# Patient Record
Sex: MALE | Race: WHITE | NOT HISPANIC OR LATINO | Employment: OTHER | URBAN - NONMETROPOLITAN AREA
[De-identification: names, ages, dates, MRNs, and addresses within clinical notes are randomized per-mention and may not be internally consistent; named-entity substitution may affect disease eponyms.]

---

## 2021-11-10 ENCOUNTER — APPOINTMENT (OUTPATIENT)
Dept: GENERAL RADIOLOGY | Facility: HOSPITAL | Age: 71
End: 2021-11-10

## 2021-11-10 ENCOUNTER — APPOINTMENT (OUTPATIENT)
Dept: CARDIOLOGY | Facility: HOSPITAL | Age: 71
End: 2021-11-10

## 2021-11-10 ENCOUNTER — HOSPITAL ENCOUNTER (INPATIENT)
Facility: HOSPITAL | Age: 71
LOS: 1 days | Discharge: ANOTHER HOSPITAL | End: 2021-11-12
Attending: EMERGENCY MEDICINE | Admitting: FAMILY MEDICINE

## 2021-11-10 DIAGNOSIS — I21.4 NON Q WAVE MYOCARDIAL INFARCTION (HCC): ICD-10-CM

## 2021-11-10 DIAGNOSIS — I21.4 NSTEMI (NON-ST ELEVATION MYOCARDIAL INFARCTION) (HCC): ICD-10-CM

## 2021-11-10 DIAGNOSIS — I10 BENIGN HYPERTENSION: ICD-10-CM

## 2021-11-10 DIAGNOSIS — R07.9 CHEST PAIN, UNSPECIFIED TYPE: Primary | ICD-10-CM

## 2021-11-10 LAB
ALBUMIN SERPL-MCNC: 4.6 G/DL (ref 3.5–5.2)
ALBUMIN/GLOB SERPL: 2 G/DL
ALP SERPL-CCNC: 53 U/L (ref 39–117)
ALT SERPL W P-5'-P-CCNC: 17 U/L (ref 1–41)
ANION GAP SERPL CALCULATED.3IONS-SCNC: 11 MMOL/L (ref 5–15)
AST SERPL-CCNC: 16 U/L (ref 1–40)
BASOPHILS # BLD AUTO: 0.09 10*3/MM3 (ref 0–0.2)
BASOPHILS NFR BLD AUTO: 1.1 % (ref 0–1.5)
BH CV ECHO MEAS - ACS: 2.3 CM
BH CV ECHO MEAS - AO MAX PG (FULL): 4.5 MMHG
BH CV ECHO MEAS - AO MAX PG: 7.4 MMHG
BH CV ECHO MEAS - AO MEAN PG (FULL): 3 MMHG
BH CV ECHO MEAS - AO MEAN PG: 4 MMHG
BH CV ECHO MEAS - AO ROOT AREA (BSA CORRECTED): 1.6
BH CV ECHO MEAS - AO ROOT AREA: 8.6 CM^2
BH CV ECHO MEAS - AO ROOT DIAM: 3.3 CM
BH CV ECHO MEAS - AO V2 MAX: 136 CM/SEC
BH CV ECHO MEAS - AO V2 MEAN: 91.3 CM/SEC
BH CV ECHO MEAS - AO V2 VTI: 22.7 CM
BH CV ECHO MEAS - ASC AORTA: 3.1 CM
BH CV ECHO MEAS - AVA(I,A): 3 CM^2
BH CV ECHO MEAS - AVA(I,D): 3 CM^2
BH CV ECHO MEAS - AVA(V,A): 2.4 CM^2
BH CV ECHO MEAS - AVA(V,D): 2.4 CM^2
BH CV ECHO MEAS - BSA(HAYCOCK): 2.1 M^2
BH CV ECHO MEAS - BSA: 2 M^2
BH CV ECHO MEAS - BZI_BMI: 25.9 KILOGRAMS/M^2
BH CV ECHO MEAS - BZI_METRIC_HEIGHT: 180.3 CM
BH CV ECHO MEAS - BZI_METRIC_WEIGHT: 84.4 KG
BH CV ECHO MEAS - EDV(CUBED): 120.6 ML
BH CV ECHO MEAS - EDV(MOD-SP2): 89.2 ML
BH CV ECHO MEAS - EDV(MOD-SP4): 94.1 ML
BH CV ECHO MEAS - EDV(TEICH): 115 ML
BH CV ECHO MEAS - EF(CUBED): 72.3 %
BH CV ECHO MEAS - EF(MOD-SP2): 57.8 %
BH CV ECHO MEAS - EF(MOD-SP4): 52.3 %
BH CV ECHO MEAS - EF(TEICH): 63.8 %
BH CV ECHO MEAS - ESV(CUBED): 33.4 ML
BH CV ECHO MEAS - ESV(MOD-SP2): 37.6 ML
BH CV ECHO MEAS - ESV(MOD-SP4): 44.9 ML
BH CV ECHO MEAS - ESV(TEICH): 41.6 ML
BH CV ECHO MEAS - FS: 34.8 %
BH CV ECHO MEAS - IVS/LVPW: 0.89
BH CV ECHO MEAS - IVSD: 0.67 CM
BH CV ECHO MEAS - LA DIMENSION: 3.6 CM
BH CV ECHO MEAS - LA/AO: 1.1
BH CV ECHO MEAS - LV DIASTOLIC VOL/BSA (35-75): 46 ML/M^2
BH CV ECHO MEAS - LV MASS(C)D: 115.2 GRAMS
BH CV ECHO MEAS - LV MASS(C)DI: 56.4 GRAMS/M^2
BH CV ECHO MEAS - LV MAX PG: 2.9 MMHG
BH CV ECHO MEAS - LV MEAN PG: 1 MMHG
BH CV ECHO MEAS - LV SYSTOLIC VOL/BSA (12-30): 22 ML/M^2
BH CV ECHO MEAS - LV V1 MAX: 85.3 CM/SEC
BH CV ECHO MEAS - LV V1 MEAN: 50.5 CM/SEC
BH CV ECHO MEAS - LV V1 VTI: 17.7 CM
BH CV ECHO MEAS - LVIDD: 4.9 CM
BH CV ECHO MEAS - LVIDS: 3.2 CM
BH CV ECHO MEAS - LVLD AP2: 8.8 CM
BH CV ECHO MEAS - LVLD AP4: 8.4 CM
BH CV ECHO MEAS - LVLS AP2: 6.9 CM
BH CV ECHO MEAS - LVLS AP4: 7.7 CM
BH CV ECHO MEAS - LVOT AREA (M): 3.8 CM^2
BH CV ECHO MEAS - LVOT AREA: 3.8 CM^2
BH CV ECHO MEAS - LVOT DIAM: 2.2 CM
BH CV ECHO MEAS - LVPWD: 0.76 CM
BH CV ECHO MEAS - MR MAX PG: 101.2 MMHG
BH CV ECHO MEAS - MR MAX VEL: 503 CM/SEC
BH CV ECHO MEAS - MV A MAX VEL: 65.6 CM/SEC
BH CV ECHO MEAS - MV DEC SLOPE: 405 CM/SEC^2
BH CV ECHO MEAS - MV E MAX VEL: 74.1 CM/SEC
BH CV ECHO MEAS - MV E/A: 1.1
BH CV ECHO MEAS - MV MAX PG: 2.8 MMHG
BH CV ECHO MEAS - MV MEAN PG: 1 MMHG
BH CV ECHO MEAS - MV P1/2T MAX VEL: 73.3 CM/SEC
BH CV ECHO MEAS - MV P1/2T: 53 MSEC
BH CV ECHO MEAS - MV V2 MAX: 83.2 CM/SEC
BH CV ECHO MEAS - MV V2 MEAN: 43.4 CM/SEC
BH CV ECHO MEAS - MV V2 VTI: 20 CM
BH CV ECHO MEAS - MVA P1/2T LCG: 3 CM^2
BH CV ECHO MEAS - MVA(P1/2T): 4.2 CM^2
BH CV ECHO MEAS - MVA(VTI): 3.4 CM^2
BH CV ECHO MEAS - PA MAX PG: 3.8 MMHG
BH CV ECHO MEAS - PA V2 MAX: 97.7 CM/SEC
BH CV ECHO MEAS - PI END-D VEL: 123 CM/SEC
BH CV ECHO MEAS - RAP SYSTOLE: 5 MMHG
BH CV ECHO MEAS - RVDD: 2.9 CM
BH CV ECHO MEAS - RVSP: 13.2 MMHG
BH CV ECHO MEAS - SI(AO): 94.9 ML/M^2
BH CV ECHO MEAS - SI(CUBED): 42.6 ML/M^2
BH CV ECHO MEAS - SI(LVOT): 32.9 ML/M^2
BH CV ECHO MEAS - SI(MOD-SP2): 25.2 ML/M^2
BH CV ECHO MEAS - SI(MOD-SP4): 24.1 ML/M^2
BH CV ECHO MEAS - SI(TEICH): 35.9 ML/M^2
BH CV ECHO MEAS - SV(AO): 194.2 ML
BH CV ECHO MEAS - SV(CUBED): 87.2 ML
BH CV ECHO MEAS - SV(LVOT): 67.3 ML
BH CV ECHO MEAS - SV(MOD-SP2): 51.6 ML
BH CV ECHO MEAS - SV(MOD-SP4): 49.2 ML
BH CV ECHO MEAS - SV(TEICH): 73.4 ML
BH CV ECHO MEAS - TR MAX VEL: 143 CM/SEC
BILIRUB SERPL-MCNC: 0.3 MG/DL (ref 0–1.2)
BUN SERPL-MCNC: 19 MG/DL (ref 8–23)
BUN/CREAT SERPL: 19.2 (ref 7–25)
CALCIUM SPEC-SCNC: 9.7 MG/DL (ref 8.6–10.5)
CHLORIDE SERPL-SCNC: 102 MMOL/L (ref 98–107)
CO2 SERPL-SCNC: 28 MMOL/L (ref 22–29)
CREAT SERPL-MCNC: 0.99 MG/DL (ref 0.76–1.27)
DEPRECATED RDW RBC AUTO: 42.1 FL (ref 37–54)
EOSINOPHIL # BLD AUTO: 0.71 10*3/MM3 (ref 0–0.4)
EOSINOPHIL NFR BLD AUTO: 8.7 % (ref 0.3–6.2)
ERYTHROCYTE [DISTWIDTH] IN BLOOD BY AUTOMATED COUNT: 12.8 % (ref 12.3–15.4)
FLUAV RNA RESP QL NAA+PROBE: NOT DETECTED
FLUBV RNA RESP QL NAA+PROBE: NOT DETECTED
GFR SERPL CREATININE-BSD FRML MDRD: 74 ML/MIN/1.73
GLOBULIN UR ELPH-MCNC: 2.3 GM/DL
GLUCOSE BLDC GLUCOMTR-MCNC: 132 MG/DL (ref 70–130)
GLUCOSE BLDC GLUCOMTR-MCNC: 160 MG/DL (ref 70–130)
GLUCOSE SERPL-MCNC: 203 MG/DL (ref 65–99)
HCT VFR BLD AUTO: 45.9 % (ref 37.5–51)
HGB BLD-MCNC: 16 G/DL (ref 13–17.7)
HOLD SPECIMEN: NORMAL
IMM GRANULOCYTES # BLD AUTO: 0.04 10*3/MM3 (ref 0–0.05)
IMM GRANULOCYTES NFR BLD AUTO: 0.5 % (ref 0–0.5)
LYMPHOCYTES # BLD AUTO: 1.26 10*3/MM3 (ref 0.7–3.1)
LYMPHOCYTES NFR BLD AUTO: 15.4 % (ref 19.6–45.3)
MCH RBC QN AUTO: 31.7 PG (ref 26.6–33)
MCHC RBC AUTO-ENTMCNC: 34.9 G/DL (ref 31.5–35.7)
MCV RBC AUTO: 90.9 FL (ref 79–97)
MONOCYTES # BLD AUTO: 0.94 10*3/MM3 (ref 0.1–0.9)
MONOCYTES NFR BLD AUTO: 11.5 % (ref 5–12)
NEUTROPHILS NFR BLD AUTO: 5.15 10*3/MM3 (ref 1.7–7)
NEUTROPHILS NFR BLD AUTO: 62.8 % (ref 42.7–76)
NRBC BLD AUTO-RTO: 0 /100 WBC (ref 0–0.2)
NT-PROBNP SERPL-MCNC: 27.4 PG/ML (ref 0–900)
PLATELET # BLD AUTO: 208 10*3/MM3 (ref 140–450)
PMV BLD AUTO: 9.4 FL (ref 6–12)
POTASSIUM SERPL-SCNC: 4.5 MMOL/L (ref 3.5–5.2)
PROT SERPL-MCNC: 6.9 G/DL (ref 6–8.5)
RBC # BLD AUTO: 5.05 10*6/MM3 (ref 4.14–5.8)
SARS-COV-2 RNA RESP QL NAA+PROBE: NOT DETECTED
SODIUM SERPL-SCNC: 141 MMOL/L (ref 136–145)
TROPONIN T SERPL-MCNC: 0.03 NG/ML (ref 0–0.03)
TROPONIN T SERPL-MCNC: 0.11 NG/ML (ref 0–0.03)
TROPONIN T SERPL-MCNC: 0.13 NG/ML (ref 0–0.03)
TROPONIN T SERPL-MCNC: 0.2 NG/ML (ref 0–0.03)
TROPONIN T SERPL-MCNC: 0.22 NG/ML (ref 0–0.03)
WBC # BLD AUTO: 8.19 10*3/MM3 (ref 3.4–10.8)

## 2021-11-10 PROCEDURE — G0378 HOSPITAL OBSERVATION PER HR: HCPCS

## 2021-11-10 PROCEDURE — 93010 ELECTROCARDIOGRAM REPORT: CPT | Performed by: INTERNAL MEDICINE

## 2021-11-10 PROCEDURE — 25010000002 ENOXAPARIN PER 10 MG: Performed by: INTERNAL MEDICINE

## 2021-11-10 PROCEDURE — 82962 GLUCOSE BLOOD TEST: CPT

## 2021-11-10 PROCEDURE — 85025 COMPLETE CBC W/AUTO DIFF WBC: CPT | Performed by: EMERGENCY MEDICINE

## 2021-11-10 PROCEDURE — 80053 COMPREHEN METABOLIC PANEL: CPT | Performed by: EMERGENCY MEDICINE

## 2021-11-10 PROCEDURE — 93005 ELECTROCARDIOGRAM TRACING: CPT | Performed by: EMERGENCY MEDICINE

## 2021-11-10 PROCEDURE — 84484 ASSAY OF TROPONIN QUANT: CPT | Performed by: EMERGENCY MEDICINE

## 2021-11-10 PROCEDURE — 84484 ASSAY OF TROPONIN QUANT: CPT | Performed by: INTERNAL MEDICINE

## 2021-11-10 PROCEDURE — 93005 ELECTROCARDIOGRAM TRACING: CPT | Performed by: INTERNAL MEDICINE

## 2021-11-10 PROCEDURE — 71045 X-RAY EXAM CHEST 1 VIEW: CPT

## 2021-11-10 PROCEDURE — 93005 ELECTROCARDIOGRAM TRACING: CPT | Performed by: NURSE PRACTITIONER

## 2021-11-10 PROCEDURE — 83880 ASSAY OF NATRIURETIC PEPTIDE: CPT | Performed by: EMERGENCY MEDICINE

## 2021-11-10 PROCEDURE — 93306 TTE W/DOPPLER COMPLETE: CPT

## 2021-11-10 PROCEDURE — 63710000001 INSULIN ASPART PER 5 UNITS: Performed by: INTERNAL MEDICINE

## 2021-11-10 PROCEDURE — 99285 EMERGENCY DEPT VISIT HI MDM: CPT

## 2021-11-10 PROCEDURE — 96360 HYDRATION IV INFUSION INIT: CPT

## 2021-11-10 PROCEDURE — 87636 SARSCOV2 & INF A&B AMP PRB: CPT | Performed by: EMERGENCY MEDICINE

## 2021-11-10 RX ORDER — DEXTROSE MONOHYDRATE 25 G/50ML
25 INJECTION, SOLUTION INTRAVENOUS
Status: DISCONTINUED | OUTPATIENT
Start: 2021-11-10 | End: 2021-11-12 | Stop reason: HOSPADM

## 2021-11-10 RX ORDER — SITAGLIPTIN AND METFORMIN HYDROCHLORIDE 1000; 50 MG/1; MG/1
TABLET, FILM COATED ORAL
COMMUNITY
End: 2021-11-10

## 2021-11-10 RX ORDER — GLIMEPIRIDE 2 MG/1
TABLET ORAL
COMMUNITY
End: 2021-11-10

## 2021-11-10 RX ORDER — LISINOPRIL 5 MG/1
5 TABLET ORAL
Status: DISCONTINUED | OUTPATIENT
Start: 2021-11-10 | End: 2021-11-12 | Stop reason: HOSPADM

## 2021-11-10 RX ORDER — HYDROXYCHLOROQUINE SULFATE 200 MG/1
200 TABLET, FILM COATED ORAL NIGHTLY
COMMUNITY

## 2021-11-10 RX ORDER — NITROGLYCERIN 0.4 MG/1
0.4 TABLET SUBLINGUAL
Status: DISCONTINUED | OUTPATIENT
Start: 2021-11-10 | End: 2021-11-12 | Stop reason: HOSPADM

## 2021-11-10 RX ORDER — FAMOTIDINE 10 MG/ML
20 INJECTION, SOLUTION INTRAVENOUS EVERY 12 HOURS SCHEDULED
Status: DISCONTINUED | OUTPATIENT
Start: 2021-11-10 | End: 2021-11-12 | Stop reason: HOSPADM

## 2021-11-10 RX ORDER — MULTIPLE VITAMINS W/ MINERALS TAB 9MG-400MCG
1 TAB ORAL DAILY
COMMUNITY

## 2021-11-10 RX ORDER — NICOTINE POLACRILEX 4 MG
15 LOZENGE BUCCAL
Status: DISCONTINUED | OUTPATIENT
Start: 2021-11-10 | End: 2021-11-12 | Stop reason: HOSPADM

## 2021-11-10 RX ORDER — ACETYLCYSTEINE 100 MG/ML
600 SOLUTION ORAL; RESPIRATORY (INHALATION) EVERY 12 HOURS SCHEDULED
Status: DISCONTINUED | OUTPATIENT
Start: 2021-11-10 | End: 2021-11-12 | Stop reason: HOSPADM

## 2021-11-10 RX ORDER — ASPIRIN 81 MG/1
324 TABLET, CHEWABLE ORAL ONCE
Status: COMPLETED | OUTPATIENT
Start: 2021-11-10 | End: 2021-11-10

## 2021-11-10 RX ORDER — SIMVASTATIN 20 MG
TABLET ORAL
COMMUNITY

## 2021-11-10 RX ORDER — LISINOPRIL 5 MG/1
TABLET ORAL
COMMUNITY
End: 2021-11-12 | Stop reason: HOSPADM

## 2021-11-10 RX ORDER — HYDROXYCHLOROQUINE SULFATE 200 MG/1
TABLET, FILM COATED ORAL
COMMUNITY
End: 2021-11-10

## 2021-11-10 RX ORDER — ASPIRIN 81 MG/1
81 TABLET, CHEWABLE ORAL NIGHTLY
COMMUNITY
End: 2021-11-20 | Stop reason: HOSPADM

## 2021-11-10 RX ORDER — ATORVASTATIN CALCIUM 10 MG/1
10 TABLET, FILM COATED ORAL DAILY
Status: DISCONTINUED | OUTPATIENT
Start: 2021-11-10 | End: 2021-11-12 | Stop reason: HOSPADM

## 2021-11-10 RX ORDER — SODIUM CHLORIDE 0.9 % (FLUSH) 0.9 %
10 SYRINGE (ML) INJECTION AS NEEDED
Status: DISCONTINUED | OUTPATIENT
Start: 2021-11-10 | End: 2021-11-12 | Stop reason: HOSPADM

## 2021-11-10 RX ORDER — ASPIRIN 81 MG/1
324 TABLET, CHEWABLE ORAL DAILY
Status: DISCONTINUED | OUTPATIENT
Start: 2021-11-11 | End: 2021-11-12 | Stop reason: HOSPADM

## 2021-11-10 RX ORDER — GLIMEPIRIDE 2 MG/1
2 TABLET ORAL
COMMUNITY

## 2021-11-10 RX ADMIN — ASPIRIN 324 MG: 81 TABLET, CHEWABLE ORAL at 05:19

## 2021-11-10 RX ADMIN — ATORVASTATIN CALCIUM 10 MG: 10 TABLET, FILM COATED ORAL at 10:03

## 2021-11-10 RX ADMIN — INSULIN ASPART 2 UNITS: 100 INJECTION, SOLUTION INTRAVENOUS; SUBCUTANEOUS at 16:42

## 2021-11-10 RX ADMIN — ACETYLCYSTEINE 600 MG: 100 SOLUTION ORAL; RESPIRATORY (INHALATION) at 20:59

## 2021-11-10 RX ADMIN — SODIUM CHLORIDE 1000 ML: 9 INJECTION, SOLUTION INTRAVENOUS at 05:20

## 2021-11-10 RX ADMIN — FAMOTIDINE 20 MG: 10 INJECTION INTRAVENOUS at 10:04

## 2021-11-10 RX ADMIN — FAMOTIDINE 20 MG: 10 INJECTION INTRAVENOUS at 20:52

## 2021-11-10 RX ADMIN — ENOXAPARIN SODIUM 80 MG: 80 INJECTION SUBCUTANEOUS at 10:03

## 2021-11-10 NOTE — CONSULTS
Cardiology Consultation Note.        Patient Name: Thai Wu  Age/Sex: 71 y.o. male  : 1950  MRN: [id number]    Date of consultation: 11/10/2021  Consulting Physician: Leroy Parker MD  Primary care Physician: Jairo Curtis MD  Requesting Physician:  Manuela Correa MD     Reason for consultation: Chest pain positive troponin.      Subjective:       Chief Complaint: Chest pain    History of Present Illness:  Thai Wu is a 71 y.o. male     Body mass index is 25.96 kg/m².  With a past medical history significant for arterial hypertension, hypertensive heart disease, hyperlipidemia, non-insulin-dependent diabetes, strong family history for atherosclerotic coronary artery disease, history of scleroderma, gastroesophageal reflux disease remote history of tobacco abuse     .    Patient over the last 2 to 3 weeks has been having on and off symptoms of chest discomfort.  Patient initially attributed it to indigestion and did not seek any medical attention.  Patient prior to this presentation had worsening of the discomfort radiating to the neck into the shoulder.  Due to the patient persistent discomfort patient was concerned and subsequently requested his wife to drive him to [facility] emergency room.    Patient on initial evaluation in the emergency room had a resting electrocardiogram done which did not show any acute ST-T wave changes.  Patient was subsequently hospitalized.  Patient had elevated troponin.    Patient troponin was trended which kept on increasing.  Patient continued to have dull aching chest discomfort which he described on a scale of 0-10 of 2/10.  Patient was administered Lovenox.    Patient on further questioning denies previous cardiac evaluation.  Patient denies any fever chill productive cough.  Patient denies exposure to Covid.  Patient denies any episodes of any bleeding diastasis.    Patient 10 point review of system except for what stated in  the history of present is negative.      Concurrent Medical History:  1.  Chest pain.  2.  Elevated troponin suggestive for non-Q myocardial infarction.  3.  Arterial hypertension.  4.  Hypertensive heart disease.  5.  Concentric left ventricular hypertrophy with an ejection fraction of 55 to 60%.  6.  Sinus rhythm with incomplete right bundle branch block and a first-degree AV block.  7.  Hyperlipidemia.  8.  Non-insulin-dependent diabetes.  9.  History of scleroderma.  10.  History of gastroesophageal reflux disease.  11.  Strong family history for coronary artery disease      Past Surgical History:  1.  Right knee surgery.  2.  Hernia repair.  3.  Right shoulder surgery.  4.  Left hand finger amputation.      Family History: Family history strongly positive for atherosclerotic coronary artery disease with father and brother who had coronary artery disease      Social History: Previous history of smokeless tobacco social alcohol intake currently retired       Cardiac Risk Factors:   1.  Male.  2.  Arterial hypertension.  3.  Hyperlipidemia.  4.  Diabetes.  5.  Family history of coronary artery disease    Allergies:  No Known Allergies    Medication:  Medications Prior to Admission   Medication Sig Dispense Refill Last Dose   • aspirin 81 MG chewable tablet Chew 81 mg Every Night.   11/9/2021 at Unknown time   • glimepiride (AMARYL) 2 MG tablet Take 2 mg by mouth Every Morning Before Breakfast.   11/10/2021 at Unknown time   • hydroxychloroquine (PLAQUENIL) 200 MG tablet Take 200 mg by mouth Every Night.   11/9/2021 at Unknown time   • lisinopril (PRINIVIL,ZESTRIL) 5 MG tablet lisinopril 5 mg tablet   11/10/2021 at Unknown time   • multivitamin with minerals tablet tablet Take 1 tablet by mouth Daily.   11/9/2021 at Unknown time   • simvastatin (ZOCOR) 20 MG tablet simvastatin 20 mg tablet   11/9/2021 at Unknown time   • sitaGLIPtin-metFORMIN (JANUMET)  MG per tablet Take 1 tablet by mouth 2 (Two) Times a  Day With Meals.   11/10/2021 at Unknown time           Review of Systems:       Constitutional:  Denies recent weight loss, weight gain, fever or chills, no change in exercise tolerance.     HENT:  Denies any hearing loss, epistaxis, hoarseness, or difficulty speaking.     Eyes: Wears eyeglasses or contact lenses     Respiratory:  Denies dyspnea with exertion,no cough, wheezing, or hemoptysis.     Cardiovascular: Positive for chest pain.  Negative for palpitations, orthopnea, PND, peripheral edema, syncope, or claudication.     Gastrointestinal:  Denies change in bowel habits, dyspepsia, ulcer disease, hematochezia, or melena.  No nausea, no vomiting, no hematemesis, no diarrhea or constipation.    Endocrine: Negative for cold intolerance, heat intolerance, polydipsia, polyphagia or polyuria. Denies any history of weight change or unintended weight loss.    Genitourinary: Negative for hematuria.  No frequent urination or nocturia.      Musculoskeletal: Denies any history of arthritic symptoms or back problems .  No joint pain, joint stiffness, joint swelling, muscle pain, muscle weakness or neck pain.    Skin:  Denies any change in hair or nails, rashes, or skin lesions.     Allergic/Immunologic: Negative.  Negative for environmental allergies, food allergies or immunocompromised state.     Neurological:  Denies any history of recurrent headaches, strokes, TIA, or seizure disorder.     Hematological: Denies excessive bleeding, easy bruising, fatigue, lymphadenopathy or petechiae or any bleeding disorders.     Psychiatric/Behavioral: Denies any history of depression, substance abuse, or change in cognitive function. Denies any psychomotor reaction or tangential thought.  No depression, homicidal ideations or suicidal ideations.          Objective:     Objective:  Temp:  [96.3 °F (35.7 °C)-97.9 °F (36.6 °C)] 96.3 °F (35.7 °C)  Heart Rate:  [61-82] 61  Resp:  [18] 18  BP: (107-141)/(58-69) 107/59      Body mass index  is 25.96 kg/m².           Physical Exam:   General Appearance:    Alert, oriented, cooperative, in no acute distress.   Head:    Normocephalic, atraumatic, without obvious abnormality.   Eyes:           KWAKU.  Lids and lashes normal, conjunctivae and sclerae normal, no icterus, no pallor.   Ears:    Ears appear intact with no abnormalities noted.   Throat:   Mucous membranes pink and moist.   Neck:   Supple, trachea midline, no carotid bruit, no organomegaly or JVD.   Lungs:     Clear to auscultation and percussion, respirations regular, even and unlabored. No wheezes, rales or rhonchi.    Heart:    Regular rhythm and normal rate, normal S1 and S2, no murmur, no gallop, no rub, no click.   Abdomen:     Soft, nontender, nondistended, no guarding, no rebound tenderness, normal bowel sounds in all four quadrants, no masses, liver and spleen nonpalpable.   Genitalia:    Deferred.   Extremities:   Moves all extremities well, no edema, no cyanosis, no  redness, no clubbing.   Pulses:   Pulses palpable and equal bilaterally.   Skin:   Moist and warm. No bleeding, bruising or rash.   Neurologic/Psychiatric:   Alert and oriented to person, place, and time.  Motor, power and tone in upper and lower extremities are grossly intact. No focal neurological deficits. Normal cognitive function. No psychomotor reaction or tangential thought. No depression, homicidal ideations and suicidal ideations.       Medication Review:   Current Facility-Administered Medications   Medication Dose Route Frequency Provider Last Rate Last Admin   • [START ON 11/11/2021] aspirin chewable tablet 324 mg  324 mg Oral Daily Manuela Correa MD       • atorvastatin (LIPITOR) tablet 10 mg  10 mg Oral Daily Manuela Correa MD   10 mg at 11/10/21 1003   • dextrose (D50W) (25 g/50 mL) IV injection 25 g  25 g Intravenous Q15 Min PRN Manuela Correa MD       • dextrose (GLUTOSE) oral gel 15 g  15 g Oral Q15 Min PRN Madeline  Manuela AGUIRRE MD       • enoxaparin (LOVENOX) syringe 80 mg  1 mg/kg Subcutaneous Q12H Manuela Correa MD   80 mg at 11/10/21 1003   • famotidine (PEPCID) injection 20 mg  20 mg Intravenous Q12H Manuela Correa MD   20 mg at 11/10/21 1004   • glucagon (human recombinant) (GLUCAGEN DIAGNOSTIC) injection 1 mg  1 mg Subcutaneous Q15 Min PRN Manuela Correa MD       • insulin aspart (novoLOG) injection 0-9 Units  0-9 Units Subcutaneous TID AC Manuela Correa MD       • lisinopril (PRINIVIL,ZESTRIL) tablet 5 mg  5 mg Oral Q24H Manuela Correa MD       • nitroglycerin (NITROSTAT) SL tablet 0.4 mg  0.4 mg Sublingual Q5 Min PRN Manuela Correa MD       • sodium chloride 0.9 % flush 10 mL  10 mL Intravenous PRN Manuela Correa MD           Lab Review:     Results from last 7 days   Lab Units 11/10/21  0513   SODIUM mmol/L 141   POTASSIUM mmol/L 4.5   CHLORIDE mmol/L 102   CO2 mmol/L 28.0   BUN mg/dL 19   CREATININE mg/dL 0.99   CALCIUM mg/dL 9.7   BILIRUBIN mg/dL 0.3   ALK PHOS U/L 53   ALT (SGPT) U/L 17   AST (SGOT) U/L 16   GLUCOSE mg/dL 203*     Results from last 7 days   Lab Units 11/10/21  0513   TROPONIN T ng/mL 0.026         Results from last 7 days   Lab Units 11/10/21  0513   WBC 10*3/mm3 8.19   HEMOGLOBIN g/dL 16.0   HEMATOCRIT % 45.9   PLATELETS 10*3/mm3 208                           EKG:   ECG/EMG Results (last 24 hours)     Procedure Component Value Units Date/Time    ECG 12 Lead [[id number]]  (Abnormal) Collected: 11/10/21 0450     Updated: 11/10/21 0556    Adult Transthoracic Echo Complete W/ Cont if Necessary Per Protocol [[id number]] Collected: 11/10/21 0930     Updated: 11/10/21 0908     BSA 2.0 m^2      RVIDd 2.9 cm      IVSd 0.67 cm      LVIDd 4.9 cm      LVIDs 3.2 cm      LVPWd 0.76 cm      IVS/LVPW 0.89     FS 34.8 %      EDV(Teich) 115.0 ml      ESV(Teich) 41.6 ml      EF(Teich) 63.8 %      EDV(cubed) 120.6 ml      ESV(cubed) 33.4 ml       EF(cubed) 72.3 %      LV mass(C)d 115.2 grams      LV mass(C)dI 56.4 grams/m^2      SV(Teich) 73.4 ml      SI(Teich) 35.9 ml/m^2      SV(cubed) 87.2 ml      SI(cubed) 42.6 ml/m^2      Ao root diam 3.3 cm      Ao root area 8.6 cm^2      ACS 2.3 cm      LA dimension 3.6 cm      asc Aorta Diam 3.1 cm      LA/Ao 1.1     LVOT diam 2.2 cm      LVOT area 3.8 cm^2      LVOT area(traced) 3.8 cm^2      LVLd ap4 8.4 cm      EDV(MOD-sp4) 94.1 ml      LVLs ap4 7.7 cm      ESV(MOD-sp4) 44.9 ml      EF(MOD-sp4) 52.3 %      LVLd ap2 8.8 cm      EDV(MOD-sp2) 89.2 ml      LVLs ap2 6.9 cm      ESV(MOD-sp2) 37.6 ml      EF(MOD-sp2) 57.8 %      SV(MOD-sp4) 49.2 ml      SI(MOD-sp4) 24.1 ml/m^2      SV(MOD-sp2) 51.6 ml      SI(MOD-sp2) 25.2 ml/m^2      Ao root area (BSA corrected) 1.6     LV Russell Vol (BSA corrected) 46.0 ml/m^2      LV Sys Vol (BSA corrected) 22.0 ml/m^2      MV E max paulina 74.1 cm/sec      MV A max paulina 65.6 cm/sec      MV E/A 1.1     MV V2 max 83.2 cm/sec      MV max PG 2.8 mmHg      MV V2 mean 43.4 cm/sec      MV mean PG 1.0 mmHg      MV V2 VTI 20.0 cm      MVA(VTI) 3.4 cm^2      MV P1/2t max paulina 73.3 cm/sec      MV P1/2t 53.0 msec      MVA(P1/2t) 4.2 cm^2      MV dec slope 405.0 cm/sec^2      Ao pk paulina 136.0 cm/sec      Ao max PG 7.4 mmHg      Ao max PG (full) 4.5 mmHg      Ao V2 mean 91.3 cm/sec      Ao mean PG 4.0 mmHg      Ao mean PG (full) 3.0 mmHg      Ao V2 VTI 22.7 cm      ALEYDA(I,A) 3.0 cm^2      ALEYDA(I,D) 3.0 cm^2      ALEYDA(V,A) 2.4 cm^2      ALEYDA(V,D) 2.4 cm^2      LV V1 max PG 2.9 mmHg      LV V1 mean PG 1.0 mmHg      LV V1 max 85.3 cm/sec      LV V1 mean 50.5 cm/sec      LV V1 VTI 17.7 cm      MR max paulina 503.0 cm/sec      MR max .2 mmHg      SV(Ao) 194.2 ml      SI(Ao) 94.9 ml/m^2      SV(LVOT) 67.3 ml      SI(LVOT) 32.9 ml/m^2      PA V2 max 97.7 cm/sec      PA max PG 3.8 mmHg      PI end-d paulina 123.0 cm/sec      TR max paulina 143.0 cm/sec      RVSP(TR) 13.2 mmHg      RAP systole 5.0 mmHg      MVA P1/2T LCG 3.0  cm^2       CV ECHO JIGNA - BZI_BMI 25.9 kilograms/m^2       CV ECHO JIGNA - BSA(HAYCOCK) 2.1 m^2       CV ECHO JIGNA - BZI_METRIC_WEIGHT 84.4 kg       CV ECHO JIGNA - BZI_METRIC_HEIGHT 180.3 cm           ECHO:       Imaging:  Imaging Results (Last 24 Hours)     Procedure Component Value Units Date/Time    XR Chest 1 View [[id number]] Collected: 11/10/21 0507     Updated: 11/10/21 0540    Narrative:      EXAM: XR CHEST 1 VIEW    HISTORY: chest pain    COMPARISON:    TECHNIQUE:   Portable view of the chest.    FINDINGS:  Mild left lung base atelectasis versus infiltrate.  Mild cardiomegaly.  The right lung remains well aerated. There is no significant  pleural effusion. The mediastinal contours are within normal  limits. .  No evidence of mediastinal shift or pneumothorax.   Unremarkable visualized osseous structures.      Impression:      1.  Mild left lung base atelectasis versus infiltrate.  2.  Mild cardiomegaly.          Electronically signed by:  Eduard Ball MD  11/10/2021 5:39 AM CST  Workstation: [id number]          I personally viewed and interpreted the patient's EKG/Telemetry data.    Assessment:   1.  Chest pain.  2.  Elevated troponin suggestive for non-Q myocardial infarction.  3.  Arterial hypertension.  4.  Hypertensive heart disease.  5.  Hyperlipidemia.  6.  History of scleroderma.  7.  Non-insulin-dependent diabetes.  8.  Family history for coronary artery disease.          Plan:   1.  Chest pain.  Patient resting electrocardiogram done reveals sinus rhythm with a first-degree AV block with incomplete right bundle branch block.  Patient has continued to have dull aching chest pain.  Patient has multiple risk factors for coronary artery disease with strong family history for coronary artery disease.  Patient has had this discomfort on and off for 2 weeks.  Patient on initial presentation had mild elevation in the troponin with subsequent troponin which is rising.  Patient has been administered  Lovenox.  Patient was initially scheduled to undergo a nuclear Cardiolite stress test which was canceled.  Patient has rising troponin.  Patient has been recommended coronary angiogram.  Risk-benefit treatment option for the coronary angiogram were discussed with the patient and an informed consent was obtained.  Patient Mallampati score #2 patient ASA classification 1 #2.  Risk for minimal sedation was also discussed with the patient.  Plan was to proceed with a coronary angiogram.  Patient would undergo a repeat troponin check.  If the patient repeat troponin is increasing and patient has worsening pain patient will be subjected to urgent coronary angiogram.  Patient would be administered IV fluids and Mucomyst.    2.  Abnormal resting electrocardiogram with a sinus rhythm with a first-degree AV block with incomplete right bundle branch block is noted.  Patient has not complained of having any symptoms of lightheaded dizziness near syncope.    3.  Arterial hypertension.  Patient blood pressure is 123/70.  Patient would be continued on the present dose of the lisinopril.  Patient has been counseled to decrease her salt intake.    4.  Hypertensive heart disease with nonrheumatic mitral and tricuspid regurgitation.  Clinically at the present time patient is not in congestive heart failure.    5.  Hyperlipidemia.  Patient has been counseled on low-fat low-cholesterol diet and to continue the present dose of the Lipitor.    6.  Non-insulin-dependent diabetes.  Patient would be administered IV fluids patient has been counseled on American diabetic Association diet.  Patient would be administered Mucomyst prior to the coronary angiogram and would follow the renal function.    7.  History of scleroderma with gastroesophageal reflux disease is noted.  Patient has had evaluation by rheumatologist Dr. Taylor in [place].  Patient has no previous history of documented CREST syndrome.    8.  Strong family history for  coronary artery disease as noted.    Thank you for the consultation.    The above plan of management were discussed with the patient      Time: Time spent in face-to-face evaluation of greater than 55 minutes interacting, formulating, examining and discussing the plan with the patient with 50% of greater time spent in face-to-face interaction.    Electronically signed by Leroy Parker MD, 11/10/21, 12:36 PM CST.    Dictated utilizing Dragon dictation.

## 2021-11-10 NOTE — PROGRESS NOTES
[facility] Medicine Services  INPATIENT PROGRESS NOTE    Length of Stay: 1  Date of Admission: 11/10/2021  Primary Care Physician: Jairo Curtis MD    Subjective   Chief Complaint: Chest tightness.     HPI: This is a 71-year-old male with past medical history of hypertension, scleroderma, and type 2 diabetes that presented to [facility] on 11/10/2021 with complaints of midsternal chest tightness and pain x1 week.    Review of Systems   Constitutional: Negative for activity change and fatigue.   HENT: Negative for ear pain and sore throat.    Eyes: Negative for pain and discharge.   Respiratory: Positive for chest tightness. Negative for cough and shortness of breath.    Cardiovascular: Positive for chest pain. Negative for palpitations.   Gastrointestinal: Negative for abdominal pain and nausea.   Endocrine: Negative for cold intolerance and heat intolerance.   Genitourinary: Negative for difficulty urinating and dysuria.   Musculoskeletal: Negative for arthralgias and gait problem.   Skin: Negative for color change and rash.   Neurological: Negative for dizziness and weakness.   Psychiatric/Behavioral: Negative for agitation and confusion.        Objective    Temp:  [96.3 °F (35.7 °C)-97.9 °F (36.6 °C)] 96.3 °F (35.7 °C)  Heart Rate:  [61-82] 61  Resp:  [18] 18  BP: (107-141)/(58-69) 107/59    Physical Exam  Constitutional:       Appearance: He is well-developed.   HENT:      Head: Normocephalic and atraumatic.   Eyes:      Pupils: Pupils are equal, round, and reactive to light.   Cardiovascular:      Rate and Rhythm: Normal rate and regular rhythm.   Pulmonary:      Effort: Pulmonary effort is normal.      Breath sounds: Normal breath sounds.   Abdominal:      General: Bowel sounds are normal.      Palpations: Abdomen is soft.   Musculoskeletal:         General: Normal range of motion.      Cervical back: Normal range of motion and neck supple.   Skin:     General: Skin is warm and  dry.   Neurological:      Mental Status: He is alert and oriented to person, place, and time.   Psychiatric:         Behavior: Behavior normal.       Results Review:  I have reviewed the labs, radiology results, and diagnostic studies.    Laboratory Data:   Results from last 7 days   Lab Units 11/10/21  0513   SODIUM mmol/L 141   POTASSIUM mmol/L 4.5   CHLORIDE mmol/L 102   CO2 mmol/L 28.0   BUN mg/dL 19   CREATININE mg/dL 0.99   GLUCOSE mg/dL 203*   CALCIUM mg/dL 9.7   BILIRUBIN mg/dL 0.3   ALK PHOS U/L 53   ALT (SGPT) U/L 17   AST (SGOT) U/L 16   ANION GAP mmol/L 11.0     Estimated Creatinine Clearance: 79.3 mL/min (by C-G formula based on SCr of 0.99 mg/dL).          Results from last 7 days   Lab Units 11/10/21  0513   WBC 10*3/mm3 8.19   HEMOGLOBIN g/dL 16.0   HEMATOCRIT % 45.9   PLATELETS 10*3/mm3 208           Culture Data:   No results found for: BLOODCX  No results found for: URINECX  No results found for: RESPCX  No results found for: WOUNDCX  No results found for: STOOLCX  No components found for: BODYFLD    Radiology Data:   Imaging Results (Last 24 Hours)     Procedure Component Value Units Date/Time    XR Chest 1 View [[id number]] Collected: 11/10/21 0507     Updated: 11/10/21 0540    Narrative:      EXAM: XR CHEST 1 VIEW    HISTORY: chest pain    COMPARISON:    TECHNIQUE:   Portable view of the chest.    FINDINGS:  Mild left lung base atelectasis versus infiltrate.  Mild cardiomegaly.  The right lung remains well aerated. There is no significant  pleural effusion. The mediastinal contours are within normal  limits. .  No evidence of mediastinal shift or pneumothorax.   Unremarkable visualized osseous structures.      Impression:      1.  Mild left lung base atelectasis versus infiltrate.  2.  Mild cardiomegaly.          Electronically signed by:  Eduard Ball MD  11/10/2021 5:39 AM CST  Workstation: [id number]          I have reviewed the patient's current medications.     Assessment/Plan     Active  Hospital Problems    Diagnosis  POA   • Chest pain [R07.9]  Yes       Plan:    1.  Chest pain, rule out ACS: Cardiology consult appreciated.  Dr. Parker notified of troponin elevation of 0.113/ 0.126..  Echocardiogram pending.  Continues telemetry.    2.  Hypertension: Continue home lisinopril.  3.  Hyperlipidemia: Continue home Lipitor.  4.  Diabetes mellitus, type II: Continue SSI.    DVT prophylaxis: Lovenox.  GI prophylaxis: Pepcid.      Discharge Planning: I expect patient to be discharged to home in 1-2 days.    I confirmed that the patient's Advance Care Plan is present, code status is documented, or surrogate decision maker is listed in the patient's medical record.      I have utilized all available immediate resources to obtain, update, or review the patient's current medications.         This document has been electronically signed by CLAUDIA Hernández on November 10, 2021 14:32 CST

## 2021-11-10 NOTE — ED NOTES
Pt presents to the ED with complaints of chest tightness x two weeks. Pt states early this morning pain was a 4 but states now pain is 0. Pt has family history of heart attack.     Iliana Hernandez RN  11/10/21 [id number]

## 2021-11-10 NOTE — ED PROVIDER NOTES
Subjective   71-year-old white male presents to the emergency department chief complaint chest pain.  Patient complains of chest pain off and on for 1 week.  He relates the pain does not radiate.  He describes the pain as tightness.  He rates the pain as 4 out of 10 severity.  He denies shortness of breath, nausea, vomiting, or diaphoresis.  Patient's cardiac risk factors include hypertension, hyperlipidemia, diabetes and family history of heart disease.          Review of Systems   Constitutional: Negative for chills, diaphoresis and fever.   Respiratory: Negative for cough and shortness of breath.    Cardiovascular: Positive for chest pain. Negative for leg swelling.   Gastrointestinal: Negative for abdominal pain, nausea and vomiting.   Genitourinary: Negative for dysuria.   Musculoskeletal: Positive for arthralgias. Negative for back pain and neck pain.   Neurological: Negative for syncope and headaches.   All other systems reviewed and are negative.      Past Medical History:   Diagnosis Date   • Scleroderma (HCC)    • Type 2 diabetes mellitus (HCC)        No Known Allergies    Past Surgical History:   Procedure Laterality Date   • HERNIA REPAIR         History reviewed. No pertinent family history.    Social History     Socioeconomic History   • Marital status:    Tobacco Use   • Smoking status: Never Smoker   • Smokeless tobacco: Former User   Substance and Sexual Activity   • Alcohol use: Yes     Comment: socially   • Drug use: Never           Objective   Physical Exam  Vitals and nursing note reviewed.   Constitutional:       General: He is not in acute distress.     Appearance: He is not toxic-appearing or diaphoretic.   HENT:      Head: Normocephalic and atraumatic.      Right Ear: External ear normal.      Left Ear: External ear normal.      Nose: Nose normal.      Mouth/Throat:      Mouth: Mucous membranes are moist.      Pharynx: Oropharynx is clear.   Eyes:      Extraocular Movements:  Extraocular movements intact.      Conjunctiva/sclera: Conjunctivae normal.      Pupils: Pupils are equal, round, and reactive to light.   Cardiovascular:      Rate and Rhythm: Normal rate and regular rhythm.      Pulses: Normal pulses.      Heart sounds: Normal heart sounds.   Pulmonary:      Effort: Pulmonary effort is normal.      Breath sounds: Normal breath sounds.   Abdominal:      General: Bowel sounds are normal. There is no distension.      Palpations: Abdomen is soft. There is no mass.      Tenderness: There is no abdominal tenderness. There is no guarding.   Musculoskeletal:      Cervical back: Normal range of motion and neck supple.      Right lower leg: No edema.      Left lower leg: No edema.   Skin:     General: Skin is warm and dry.   Neurological:      General: No focal deficit present.      Mental Status: He is alert and oriented to person, place, and time.   Psychiatric:         Mood and Affect: Mood normal.         Behavior: Behavior normal.         ECG 12 Lead      Date/Time: 11/10/2021 4:50 AM  Performed by: Glen Dobbins MD  Authorized by: Glen Dobbins MD   Interpreted by physician  Clinical impression: abnormal ECG  Comments: Normal sinus rhythm rate 83.  No ST elevation.  Poor R wave progression.                 ED Course  ED Course as of 11/10/21 0556   Wed Nov 10, 2021   0555 Patient is alert and resting comfortably in no acute distress.  I reviewed the results of his evaluation with him and recommended admission for observation.  Case discussed with the hospitalist.  She agrees to admit the patient to telemetry. [DR]      ED Course User Index  [DR] Glen Dobbins MD            Labs Reviewed   COMPREHENSIVE METABOLIC PANEL - Abnormal; Notable for the following components:       Result Value    Glucose 203 (*)     All other components within normal limits    Narrative:     GFR Normal >60  Chronic Kidney Disease <60  Kidney Failure <15     CBC WITH AUTO DIFFERENTIAL - Abnormal; Notable for  the following components:    Lymphocyte % 15.4 (*)     Eosinophil % 8.7 (*)     Monocytes, Absolute 0.94 (*)     Eosinophils, Absolute 0.71 (*)     All other components within normal limits   COVID-19 AND FLU A/B, NP SWAB IN TRANSPORT MEDIA 8-12 HR TAT - Normal    Narrative:     Fact sheet for providers: https://www.fda.gov/media/638691/download    Fact sheet for patients: https://www.fda.gov/media/658232/download    Test performed by PCR.   BNP (IN-HOUSE) - Normal    Narrative:     Among patients with dyspnea, NT-proBNP is highly sensitive for the detection of acute congestive heart failure. In addition NT-proBNP of <300 pg/ml effectively rules out acute congestive heart failure with 99% negative predictive value.    Results may be falsely decreased if patient taking Biotin.     TROPONIN (IN-HOUSE) - Normal    Narrative:     Troponin T Reference Range:  <= 0.03 ng/mL-   Negative for AMI  >0.03 ng/mL-     Abnormal for myocardial necrosis.  Clinicians would have to utilize clinical acumen, EKG, Troponin and serial changes to determine if it is an Acute Myocardial Infarction or myocardial injury due to an underlying chronic condition.       Results may be falsely decreased if patient taking Biotin.     CBC AND DIFFERENTIAL    Narrative:     The following orders were created for panel order CBC & Differential.  Procedure                               Abnormality         Status                     ---------                               -----------         ------                     CBC Auto Differential[[id number]]        Abnormal            Final result                 Please view results for these tests on the individual orders.   EXTRA TUBES    Narrative:     The following orders were created for panel order Extra Tubes.  Procedure                               Abnormality         Status                     ---------                               -----------         ------                     Gold Top -  SST[[id number]]                                                              Light Blue Top[[id number]]                                                                Please view results for these tests on the individual orders.   GOLD TOP - SST   LIGHT BLUE TOP     XR Chest 1 View    Result Date: 11/10/2021  Narrative: EXAM: XR CHEST 1 VIEW HISTORY: chest pain COMPARISON: TECHNIQUE: Portable view of the chest. FINDINGS: Mild left lung base atelectasis versus infiltrate. Mild cardiomegaly. The right lung remains well aerated. There is no significant pleural effusion. The mediastinal contours are within normal limits. . No evidence of mediastinal shift or pneumothorax. Unremarkable visualized osseous structures.     Impression: 1.  Mild left lung base atelectasis versus infiltrate. 2.  Mild cardiomegaly. Electronically signed by:  Eduard Ball MD  11/10/2021 5:39 AM CST Workstation: [id number]                                  HEART Score (for prediction of 6-week risk of major adverse cardiac event) reviewed and/or performed as part of the patient evaluation and treatment planning process.  The result associated with this review/performance is: 5       MDM    Final diagnoses:   Chest pain, unspecified type       ED Disposition  ED Disposition     ED Disposition Condition Comment    Decision to Admit  Level of Care: Telemetry [5]   Diagnosis: Chest pain, unspecified type [[id number]]   Admitting Physician: ELISHA MILLER [[id number]]   Attending Physician: ELISHA MILLER [[id number]]            No follow-up provider specified.       Medication List      No changes were made to your prescriptions during this visit.          Glen Dobbins MD  11/10/21 0556

## 2021-11-10 NOTE — H&P
[facility] Medicine Admission      Date of Admission: 11/10/2021      Primary Care Physician: Jairo Curtis MD      Chief Complaint:   Comes in due to chest pain across midsternal chest wall associated with radiation across chest to arms and neck and shoulder and pain was a 4 out 10.  The worst that he has had was this am.  Has had pain for the last one week.     Cc is chest pain       HPI:  He was obviously concerned and told his wife and they both brought him to ER for evaluation.     Chest pain has now dissipated.   He is stable and able to converse and ambulate and participate in activities.  However, if he tries to lie flat, pain recurs.  His father has cad and mi in the recent past.  Brother who is one year older than him, has known cad and MI.  He is obviously concerned     He has a dx of scleroderma and this can predispose to premature heart disease and also cause severe gerd and reflux due to the CREST syndrome, and the above sx could certainly be related to this disease process..  However, at present, the most important testing and value for investment of time and admission is to rule out, cardiac causes, that could be life threatening .  He is agreeable to that plan.     Will admit him to hospital   Control bp with lisinopril and avoid any beta blocker for stress test purposes.     Nitroglycerin if repeat chest pain   Statin to decrease cholesterol values.   Asa full dose and then back down to 81 mg po daily     Full dose anticoagulation with lovenox     ekg with some decrease st t wave changes anteriorly no previous ekg for comparison noted.     Monitor troponin levels    Type ii dm and monitor accucheks and use sliding scale insulin  Hold off on metfromin and amaryl at present for dye load with stress test.     Echocardiogram to check and define lv function     Consult cardiology please this am as well. They have not yet been called.      Concurrent Medical History:  has a past medical history of Scleroderma (HCC) and Type 2 diabetes mellitus (HCC).    Past Surgical History:  has a past surgical history that includes Hernia repair.    Family History:  Father has cad and mi   One brother has been dx with cad and mi   Strong family history of heart disease     Social History:  reports that he has never smoked. He quit smokeless tobacco use about 2 weeks ago. He reports current alcohol use. He reports that he does not use drugs.    Allergies: No Known Allergies    Medications:   Prior to Admission medications    Medication Sig Start Date End Date Taking? Authorizing Provider   lisinopril (PRINIVIL,ZESTRIL) 5 MG tablet lisinopril 5 mg tablet    Renetta Paniagua MD   simvastatin (ZOCOR) 20 MG tablet simvastatin 20 mg tablet    Renetta Paniagua MD   Aspirin Buf,CaCarb-MgCarb-MgO, 81 MG tablet aspirin 81 mg tablet   Take by oral route.  11/10/21  Renetta Paniagua MD   glimepiride (AMARYL) 2 MG tablet glimepiride 2 mg tablet  11/10/21  Renetta Paniagua MD   hydroxychloroquine (PLAQUENIL) 200 MG tablet hydroxychloroquine 200 mg tablet  11/10/21  Renetta Paniagua MD   sitaGLIPtin-metFORMIN (Janumet)  MG per tablet Janumet 50 mg-1,000 mg tablet  11/10/21  ProviderRenetta MD       Review of Systems:  Review of Systems   Constitutional: Negative.    HENT: Negative.    Eyes: Negative.    Respiratory: Negative.    Cardiovascular: Positive for chest pain and palpitations.   Gastrointestinal: Negative.    Endocrine: Negative.    Genitourinary: Negative.    Skin: Negative.    Neurological: Negative.    Hematological: Negative.    Psychiatric/Behavioral: Negative.    All other systems reviewed and are negative.     Otherwise complete ROS is negative except as mentioned above.    Physical Exam:   Temp:  [97.9 °F (36.6 °C)] 97.9 °F (36.6 °C)  Heart Rate:  [72-82] 79  Resp:  [18] 18  BP: (128-141)/(62-68) 136/65  Physical  Exam  Vitals reviewed.   HENT:      Head: Normocephalic and atraumatic.      Nose: Nose normal.      Mouth/Throat:      Mouth: Mucous membranes are moist.      Pharynx: Oropharynx is clear.   Eyes:      Conjunctiva/sclera: Conjunctivae normal.   Cardiovascular:      Rate and Rhythm: Normal rate and regular rhythm.      Pulses: Normal pulses.   Pulmonary:      Effort: Pulmonary effort is normal.      Breath sounds: Normal breath sounds.   Abdominal:      General: Abdomen is flat. Bowel sounds are normal.   Musculoskeletal:         General: Normal range of motion.   Neurological:      General: No focal deficit present.      Mental Status: He is alert.           Results Reviewed:  I have personally reviewed current lab, radiology, and data and agree with results.  Lab Results (last 24 hours)     Procedure Component Value Units Date/Time    Comprehensive Metabolic Panel [[id number]]  (Abnormal) Collected: 11/10/21 0513    Specimen: Blood Updated: 11/10/21 0551     Glucose 203 mg/dL      BUN 19 mg/dL      Creatinine 0.99 mg/dL      Sodium 141 mmol/L      Potassium 4.5 mmol/L      Comment: Slight hemolysis detected by analyzer. Results may be affected.        Chloride 102 mmol/L      CO2 28.0 mmol/L      Calcium 9.7 mg/dL      Total Protein 6.9 g/dL      Albumin 4.60 g/dL      ALT (SGPT) 17 U/L      AST (SGOT) 16 U/L      Alkaline Phosphatase 53 U/L      Total Bilirubin 0.3 mg/dL      eGFR Non African Amer 74 mL/min/1.73      Globulin 2.3 gm/dL      A/G Ratio 2.0 g/dL      BUN/Creatinine Ratio 19.2     Anion Gap 11.0 mmol/L     Narrative:      GFR Normal >60  Chronic Kidney Disease <60  Kidney Failure <15      Troponin [[id number]]  (Normal) Collected: 11/10/21 0513    Specimen: Blood Updated: 11/10/21 0551     Troponin T 0.026 ng/mL     Narrative:      Troponin T Reference Range:  <= 0.03 ng/mL-   Negative for AMI  >0.03 ng/mL-     Abnormal for myocardial necrosis.  Clinicians would have to utilize clinical acumen, EKG,  Troponin and serial changes to determine if it is an Acute Myocardial Infarction or myocardial injury due to an underlying chronic condition.       Results may be falsely decreased if patient taking Biotin.      COVID-19 and FLU A/B PCR - Swab, Nasopharynx [[id number]]  (Normal) Collected: 11/10/21 0524    Specimen: Swab from Nasopharynx Updated: 11/10/21 0551     COVID19 Not Detected     Influenza A PCR Not Detected     Influenza B PCR Not Detected    Narrative:      Fact sheet for providers: https://www.fda.gov/media/917636/download    Fact sheet for patients: https://www.fda.gov/media/101448/download    Test performed by PCR.    BNP [[id number]]  (Normal) Collected: 11/10/21 0513    Specimen: Blood Updated: 11/10/21 0549     proBNP 27.4 pg/mL     Narrative:      Among patients with dyspnea, NT-proBNP is highly sensitive for the detection of acute congestive heart failure. In addition NT-proBNP of <300 pg/ml effectively rules out acute congestive heart failure with 99% negative predictive value.    Results may be falsely decreased if patient taking Biotin.      CBC & Differential [[id number]]  (Abnormal) Collected: 11/10/21 0513    Specimen: Blood Updated: 11/10/21 0530    Narrative:      The following orders were created for panel order CBC & Differential.  Procedure                               Abnormality         Status                     ---------                               -----------         ------                     CBC Auto Differential[[id number]]        Abnormal            Final result                 Please view results for these tests on the individual orders.    CBC Auto Differential [[id number]]  (Abnormal) Collected: 11/10/21 0513    Specimen: Blood Updated: 11/10/21 0530     WBC 8.19 10*3/mm3      RBC 5.05 10*6/mm3      Hemoglobin 16.0 g/dL      Hematocrit 45.9 %      MCV 90.9 fL      MCH 31.7 pg      MCHC 34.9 g/dL      RDW 12.8 %      RDW-SD 42.1 fl      MPV 9.4 fL      Platelets 208 10*3/mm3       Neutrophil % 62.8 %      Lymphocyte % 15.4 %      Monocyte % 11.5 %      Eosinophil % 8.7 %      Basophil % 1.1 %      Immature Grans % 0.5 %      Neutrophils, Absolute 5.15 10*3/mm3      Lymphocytes, Absolute 1.26 10*3/mm3      Monocytes, Absolute 0.94 10*3/mm3      Eosinophils, Absolute 0.71 10*3/mm3      Basophils, Absolute 0.09 10*3/mm3      Immature Grans, Absolute 0.04 10*3/mm3      nRBC 0.0 /100 WBC         Imaging Results (Last 24 Hours)     Procedure Component Value Units Date/Time    XR Chest 1 View [[id number]] Collected: 11/10/21 0507     Updated: 11/10/21 0540    Narrative:      EXAM: XR CHEST 1 VIEW    HISTORY: chest pain    COMPARISON:    TECHNIQUE:   Portable view of the chest.    FINDINGS:  Mild left lung base atelectasis versus infiltrate.  Mild cardiomegaly.  The right lung remains well aerated. There is no significant  pleural effusion. The mediastinal contours are within normal  limits. .  No evidence of mediastinal shift or pneumothorax.   Unremarkable visualized osseous structures.      Impression:      1.  Mild left lung base atelectasis versus infiltrate.  2.  Mild cardiomegaly.          Electronically signed by:  Eduard Ball MD  11/10/2021 5:39 AM CST  Workstation: [id number]            Assessment:    Active Hospital Problems    Diagnosis    • Chest pain              Plan:    1.  Typical midsternal chest pain with radiation across anterior chest wall lasted well over one hour and has been occurring on and off this week.  Today this am, pain was the worst and thus he came in for evaluation.     2.  He has mild hypertension     3.  Type ii dm on oral agents.     4.  He remains active     5. Strong family history of heart disease     6.  No tobacco and no etoh     7.  Will admit and request cardiology consult and set up stress test and echo cardiogram   And trend troponin's and labs.     He is agreeable to this plan.     I confirmed that the patient's Advance Care Plan is present, code status  is documented, or surrogate decision maker is listed in the patient's medical record.     I have utilized all available immediate resources to obtain, update, or review the patient's current medications.     I discussed the patient's findings and my recommendations with: patient and wife     Manuela Correa MD

## 2021-11-11 ENCOUNTER — PREP FOR SURGERY (OUTPATIENT)
Dept: OTHER | Facility: HOSPITAL | Age: 71
End: 2021-11-11

## 2021-11-11 ENCOUNTER — TELEPHONE (OUTPATIENT)
Dept: CARDIAC SURGERY | Facility: CLINIC | Age: 71
End: 2021-11-11

## 2021-11-11 DIAGNOSIS — I25.118 CORONARY ARTERY DISEASE OF NATIVE HEART WITH STABLE ANGINA PECTORIS, UNSPECIFIED VESSEL OR LESION TYPE (HCC): Primary | ICD-10-CM

## 2021-11-11 PROBLEM — I21.4 NON Q WAVE MYOCARDIAL INFARCTION: Status: ACTIVE | Noted: 2021-11-10

## 2021-11-11 LAB
ALBUMIN SERPL-MCNC: 4.1 G/DL (ref 3.5–5.2)
ALBUMIN/GLOB SERPL: 1.9 G/DL
ALP SERPL-CCNC: 42 U/L (ref 39–117)
ALT SERPL W P-5'-P-CCNC: 15 U/L (ref 1–41)
ANION GAP SERPL CALCULATED.3IONS-SCNC: 7 MMOL/L (ref 5–15)
AST SERPL-CCNC: 15 U/L (ref 1–40)
BILIRUB SERPL-MCNC: 0.4 MG/DL (ref 0–1.2)
BUN SERPL-MCNC: 16 MG/DL (ref 8–23)
BUN/CREAT SERPL: 15.1 (ref 7–25)
CALCIUM SPEC-SCNC: 8.8 MG/DL (ref 8.6–10.5)
CHLORIDE SERPL-SCNC: 101 MMOL/L (ref 98–107)
CO2 SERPL-SCNC: 27 MMOL/L (ref 22–29)
CREAT SERPL-MCNC: 1.06 MG/DL (ref 0.76–1.27)
DEPRECATED RDW RBC AUTO: 41.7 FL (ref 37–54)
ERYTHROCYTE [DISTWIDTH] IN BLOOD BY AUTOMATED COUNT: 12.6 % (ref 12.3–15.4)
GFR SERPL CREATININE-BSD FRML MDRD: 68 ML/MIN/1.73
GLOBULIN UR ELPH-MCNC: 2.2 GM/DL
GLUCOSE BLDC GLUCOMTR-MCNC: 155 MG/DL (ref 70–130)
GLUCOSE BLDC GLUCOMTR-MCNC: 178 MG/DL (ref 70–130)
GLUCOSE BLDC GLUCOMTR-MCNC: 186 MG/DL (ref 70–130)
GLUCOSE BLDC GLUCOMTR-MCNC: 196 MG/DL (ref 70–130)
GLUCOSE BLDC GLUCOMTR-MCNC: 236 MG/DL (ref 70–130)
GLUCOSE SERPL-MCNC: 218 MG/DL (ref 65–99)
HCT VFR BLD AUTO: 41.9 % (ref 37.5–51)
HGB BLD-MCNC: 14.3 G/DL (ref 13–17.7)
INR PPP: 1.06 (ref 0.8–1.2)
MCH RBC QN AUTO: 31.1 PG (ref 26.6–33)
MCHC RBC AUTO-ENTMCNC: 34.1 G/DL (ref 31.5–35.7)
MCV RBC AUTO: 91.1 FL (ref 79–97)
PLATELET # BLD AUTO: 182 10*3/MM3 (ref 140–450)
PMV BLD AUTO: 9.4 FL (ref 6–12)
POTASSIUM SERPL-SCNC: 4.4 MMOL/L (ref 3.5–5.2)
PROT SERPL-MCNC: 6.3 G/DL (ref 6–8.5)
PROTHROMBIN TIME: 13.7 SECONDS (ref 11.1–15.3)
QT INTERVAL: 376 MS
QTC INTERVAL: 441 MS
RBC # BLD AUTO: 4.6 10*6/MM3 (ref 4.14–5.8)
SODIUM SERPL-SCNC: 135 MMOL/L (ref 136–145)
TROPONIN T SERPL-MCNC: 0.16 NG/ML (ref 0–0.03)
TROPONIN T SERPL-MCNC: 0.2 NG/ML (ref 0–0.03)
TROPONIN T SERPL-MCNC: 0.26 NG/ML (ref 0–0.03)
WBC # BLD AUTO: 8 10*3/MM3 (ref 3.4–10.8)

## 2021-11-11 PROCEDURE — 25010000002 HEPARIN (PORCINE) PER 1000 UNITS: Performed by: INTERNAL MEDICINE

## 2021-11-11 PROCEDURE — 93010 ELECTROCARDIOGRAM REPORT: CPT | Performed by: INTERNAL MEDICINE

## 2021-11-11 PROCEDURE — 84484 ASSAY OF TROPONIN QUANT: CPT | Performed by: INTERNAL MEDICINE

## 2021-11-11 PROCEDURE — 25010000002 MIDAZOLAM PER 1 MG: Performed by: INTERNAL MEDICINE

## 2021-11-11 PROCEDURE — 4A023N7 MEASUREMENT OF CARDIAC SAMPLING AND PRESSURE, LEFT HEART, PERCUTANEOUS APPROACH: ICD-10-PCS | Performed by: INTERNAL MEDICINE

## 2021-11-11 PROCEDURE — 80053 COMPREHEN METABOLIC PANEL: CPT | Performed by: INTERNAL MEDICINE

## 2021-11-11 PROCEDURE — C1769 GUIDE WIRE: HCPCS | Performed by: INTERNAL MEDICINE

## 2021-11-11 PROCEDURE — 25010000002 ENOXAPARIN PER 10 MG: Performed by: INTERNAL MEDICINE

## 2021-11-11 PROCEDURE — 63710000001 INSULIN ASPART PER 5 UNITS: Performed by: INTERNAL MEDICINE

## 2021-11-11 PROCEDURE — G0378 HOSPITAL OBSERVATION PER HR: HCPCS

## 2021-11-11 PROCEDURE — 85610 PROTHROMBIN TIME: CPT | Performed by: INTERNAL MEDICINE

## 2021-11-11 PROCEDURE — C1760 CLOSURE DEV, VASC: HCPCS | Performed by: INTERNAL MEDICINE

## 2021-11-11 PROCEDURE — 25010000002 FENTANYL CITRATE (PF) 50 MCG/ML SOLUTION: Performed by: INTERNAL MEDICINE

## 2021-11-11 PROCEDURE — C1894 INTRO/SHEATH, NON-LASER: HCPCS | Performed by: INTERNAL MEDICINE

## 2021-11-11 PROCEDURE — 0 IOPAMIDOL PER 1 ML: Performed by: INTERNAL MEDICINE

## 2021-11-11 PROCEDURE — 93458 L HRT ARTERY/VENTRICLE ANGIO: CPT | Performed by: INTERNAL MEDICINE

## 2021-11-11 PROCEDURE — 82962 GLUCOSE BLOOD TEST: CPT

## 2021-11-11 PROCEDURE — 93005 ELECTROCARDIOGRAM TRACING: CPT | Performed by: INTERNAL MEDICINE

## 2021-11-11 PROCEDURE — B2111ZZ FLUOROSCOPY OF MULTIPLE CORONARY ARTERIES USING LOW OSMOLAR CONTRAST: ICD-10-PCS | Performed by: INTERNAL MEDICINE

## 2021-11-11 PROCEDURE — B2151ZZ FLUOROSCOPY OF LEFT HEART USING LOW OSMOLAR CONTRAST: ICD-10-PCS | Performed by: INTERNAL MEDICINE

## 2021-11-11 PROCEDURE — 85027 COMPLETE CBC AUTOMATED: CPT | Performed by: INTERNAL MEDICINE

## 2021-11-11 PROCEDURE — C1887 CATHETER, GUIDING: HCPCS | Performed by: INTERNAL MEDICINE

## 2021-11-11 RX ORDER — MIDAZOLAM HYDROCHLORIDE 1 MG/ML
INJECTION INTRAMUSCULAR; INTRAVENOUS AS NEEDED
Status: DISCONTINUED | OUTPATIENT
Start: 2021-11-11 | End: 2021-11-11 | Stop reason: HOSPADM

## 2021-11-11 RX ORDER — SODIUM CHLORIDE 9 MG/ML
100 INJECTION, SOLUTION INTRAVENOUS CONTINUOUS
Status: DISCONTINUED | OUTPATIENT
Start: 2021-11-11 | End: 2021-11-12

## 2021-11-11 RX ORDER — FENTANYL CITRATE 50 UG/ML
INJECTION, SOLUTION INTRAMUSCULAR; INTRAVENOUS AS NEEDED
Status: DISCONTINUED | OUTPATIENT
Start: 2021-11-11 | End: 2021-11-11 | Stop reason: HOSPADM

## 2021-11-11 RX ORDER — SODIUM CHLORIDE 9 MG/ML
75 INJECTION, SOLUTION INTRAVENOUS CONTINUOUS
Status: DISCONTINUED | OUTPATIENT
Start: 2021-11-11 | End: 2021-11-12

## 2021-11-11 RX ORDER — SODIUM CHLORIDE 0.9 % (FLUSH) 0.9 %
3 SYRINGE (ML) INJECTION EVERY 12 HOURS SCHEDULED
Status: DISCONTINUED | OUTPATIENT
Start: 2021-11-11 | End: 2021-11-11 | Stop reason: HOSPADM

## 2021-11-11 RX ORDER — LIDOCAINE HYDROCHLORIDE 20 MG/ML
INJECTION, SOLUTION INFILTRATION; PERINEURAL AS NEEDED
Status: DISCONTINUED | OUTPATIENT
Start: 2021-11-11 | End: 2021-11-11 | Stop reason: HOSPADM

## 2021-11-11 RX ORDER — ACETAMINOPHEN 325 MG/1
650 TABLET ORAL EVERY 4 HOURS PRN
Status: DISCONTINUED | OUTPATIENT
Start: 2021-11-11 | End: 2021-11-12 | Stop reason: HOSPADM

## 2021-11-11 RX ORDER — SODIUM CHLORIDE 0.9 % (FLUSH) 0.9 %
10 SYRINGE (ML) INJECTION AS NEEDED
Status: DISCONTINUED | OUTPATIENT
Start: 2021-11-11 | End: 2021-11-11 | Stop reason: HOSPADM

## 2021-11-11 RX ADMIN — INSULIN ASPART 4 UNITS: 100 INJECTION, SOLUTION INTRAVENOUS; SUBCUTANEOUS at 17:39

## 2021-11-11 RX ADMIN — SODIUM CHLORIDE 100 ML/HR: 9 INJECTION, SOLUTION INTRAVENOUS at 10:52

## 2021-11-11 RX ADMIN — ENOXAPARIN SODIUM 80 MG: 80 INJECTION SUBCUTANEOUS at 20:49

## 2021-11-11 RX ADMIN — INSULIN ASPART 2 UNITS: 100 INJECTION, SOLUTION INTRAVENOUS; SUBCUTANEOUS at 12:41

## 2021-11-11 RX ADMIN — SODIUM CHLORIDE 75 ML/HR: 9 INJECTION, SOLUTION INTRAVENOUS at 06:26

## 2021-11-11 RX ADMIN — SODIUM CHLORIDE 100 ML/HR: 9 INJECTION, SOLUTION INTRAVENOUS at 18:24

## 2021-11-11 RX ADMIN — LISINOPRIL 5 MG: 5 TABLET ORAL at 07:56

## 2021-11-11 RX ADMIN — ASPIRIN 324 MG: 81 TABLET, CHEWABLE ORAL at 07:55

## 2021-11-11 RX ADMIN — FAMOTIDINE 20 MG: 10 INJECTION INTRAVENOUS at 20:49

## 2021-11-11 RX ADMIN — ACETYLCYSTEINE 600 MG: 100 SOLUTION ORAL; RESPIRATORY (INHALATION) at 20:49

## 2021-11-11 RX ADMIN — ATORVASTATIN CALCIUM 10 MG: 10 TABLET, FILM COATED ORAL at 07:56

## 2021-11-11 RX ADMIN — Medication 3 ML: at 08:12

## 2021-11-11 NOTE — PROGRESS NOTES
[facility] Medicine Services  INPATIENT PROGRESS NOTE    Length of Stay: 1  Date of Admission: 11/10/2021  Primary Care Physician: Jairo Curtis MD    Subjective   Chief Complaint: Chest tightness  HPI:  71 year ld male with a history of HTN, DMII, and scleroderma who presented with chest tightness. Cardiac enzymes trended up.  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  He is currently free of chest pain.     Review of Systems   Constitutional: Negative for chills and fever.   Respiratory: Negative for shortness of breath.    Cardiovascular: Negative for chest pain.   Gastrointestinal: Negative for abdominal pain, nausea and vomiting.   All other systems reviewed and are negative.       All pertinent negatives and positives are as above. All other systems have been reviewed and are negative unless otherwise stated.     Objective    Temp:  [97.1 °F (36.2 °C)-98 °F (36.7 °C)] 97.5 °F (36.4 °C)  Heart Rate:  [60-72] 70  Resp:  [16-18] 16  BP: ()/(52-74) 116/57    Physical Exam  Vitals reviewed.   Constitutional:       General: He is not in acute distress.     Appearance: Normal appearance.   HENT:      Head: Normocephalic and atraumatic.   Cardiovascular:      Rate and Rhythm: Normal rate and regular rhythm.      Pulses: Normal pulses.   Pulmonary:      Effort: Pulmonary effort is normal.      Breath sounds: Normal breath sounds.   Abdominal:      General: There is no distension.      Palpations: Abdomen is soft.      Tenderness: There is no abdominal tenderness.   Musculoskeletal:         General: No swelling or deformity.   Skin:     General: Skin is warm and dry.   Neurological:      General: No focal deficit present.      Mental Status: He is alert and oriented to person, place, and time.         Results Review:  I have reviewed the labs, radiology results, and diagnostic  studies.    Laboratory Data:   Results from last 7 days   Lab Units 11/11/21  0526 11/10/21  0513   SODIUM mmol/L 135* 141   POTASSIUM mmol/L 4.4 4.5   CHLORIDE mmol/L 101 102   CO2 mmol/L 27.0 28.0   BUN mg/dL 16 19   CREATININE mg/dL 1.06 0.99   GLUCOSE mg/dL 218* 203*   CALCIUM mg/dL 8.8 9.7   BILIRUBIN mg/dL 0.4 0.3   ALK PHOS U/L 42 53   ALT (SGPT) U/L 15 17   AST (SGOT) U/L 15 16   ANION GAP mmol/L 7.0 11.0     Estimated Creatinine Clearance: 71.5 mL/min (by C-G formula based on SCr of 1.06 mg/dL).          Results from last 7 days   Lab Units 11/11/21  0526 11/10/21  0513   WBC 10*3/mm3 8.00 8.19   HEMOGLOBIN g/dL 14.3 16.0   HEMATOCRIT % 41.9 45.9   PLATELETS 10*3/mm3 182 208     Results from last 7 days   Lab Units 11/11/21  0638   INR  1.06       Culture Data:   No results found for: BLOODCX  No results found for: URINECX  No results found for: RESPCX  No results found for: WOUNDCX  No results found for: STOOLCX  No components found for: BODYFLD    Radiology Data:   Imaging Results (Last 24 Hours)     ** No results found for the last 24 hours. **          I have reviewed the patient's current medications.     Assessment/Plan     Active Hospital Problems    Diagnosis    • Chest pain    • Non Q wave myocardial infarction (HCC)      Added automatically from request for surgery [id number]     HTN  HLD  DMII    Plan:    Consideration for CABG, Cardiology discussed with Dr. Dumont at [facility]   Aspirin  Statin  Continue anticoagulation with Lovenox  Glucose control: SSI 0-9 units  BP control: lisinopril    I confirmed that the patient's Advance Care Plan is present, code status is documented, or surrogate decision maker is listed in the patient's medical record.     The patient was evaluated during the global COVID-19 pandemic, and the diagnosis was suspected/considered upon their initial presentation.  Evaluation, treatment, and testing were consistent with current guidelines for patients who present  with complaints or symptoms that may be related to COVID-19.  .        This document has been electronically signed by CLAUDIA Sin on November 11, 2021 11:34 CST

## 2021-11-11 NOTE — PROGRESS NOTES
Patient continues to have symptoms of chest tightness with rising troponin.  Have discussed at length with the patient the need for coronary angiogram.    Risk-benefit treatment option for the coronary angiogram were discussed with the patient and an informed consent was obtained.  Patient Mallampati score #2 patient ASA classification #2.

## 2021-11-12 ENCOUNTER — HOSPITAL ENCOUNTER (INPATIENT)
Facility: HOSPITAL | Age: 71
LOS: 8 days | Discharge: HOME HEALTH | End: 2021-11-20
Admitting: THORACIC SURGERY (CARDIOTHORACIC VASCULAR SURGERY)

## 2021-11-12 VITALS
WEIGHT: 181.4 LBS | HEART RATE: 65 BPM | SYSTOLIC BLOOD PRESSURE: 121 MMHG | DIASTOLIC BLOOD PRESSURE: 58 MMHG | HEIGHT: 71 IN | TEMPERATURE: 96.5 F | OXYGEN SATURATION: 97 % | RESPIRATION RATE: 16 BRPM | BODY MASS INDEX: 25.4 KG/M2

## 2021-11-12 DIAGNOSIS — Z95.1 S/P CABG (CORONARY ARTERY BYPASS GRAFT): ICD-10-CM

## 2021-11-12 DIAGNOSIS — I25.10 CORONARY ARTERY DISEASE INVOLVING NATIVE CORONARY ARTERY OF NATIVE HEART WITHOUT ANGINA PECTORIS: Primary | ICD-10-CM

## 2021-11-12 LAB
ALBUMIN SERPL-MCNC: 4.8 G/DL (ref 3.5–5.2)
ALBUMIN/GLOB SERPL: 1.9 G/DL
ALP SERPL-CCNC: 49 U/L (ref 39–117)
ALT SERPL W P-5'-P-CCNC: 21 U/L (ref 1–41)
ANION GAP SERPL CALCULATED.3IONS-SCNC: 7 MMOL/L (ref 5–15)
ANION GAP SERPL CALCULATED.3IONS-SCNC: 9.6 MMOL/L (ref 5–15)
APTT PPP: 29.1 SECONDS (ref 22.7–35.4)
AST SERPL-CCNC: 18 U/L (ref 1–40)
BASOPHILS # BLD AUTO: 0.08 10*3/MM3 (ref 0–0.2)
BASOPHILS # BLD AUTO: 0.1 10*3/MM3 (ref 0–0.2)
BASOPHILS NFR BLD AUTO: 0.8 % (ref 0–1.5)
BASOPHILS NFR BLD AUTO: 1.1 % (ref 0–1.5)
BILIRUB SERPL-MCNC: 0.4 MG/DL (ref 0–1.2)
BUN SERPL-MCNC: 12 MG/DL (ref 8–23)
BUN SERPL-MCNC: 14 MG/DL (ref 8–23)
BUN/CREAT SERPL: 12.9 (ref 7–25)
BUN/CREAT SERPL: 15.4 (ref 7–25)
CALCIUM SPEC-SCNC: 8.9 MG/DL (ref 8.6–10.5)
CALCIUM SPEC-SCNC: 9.4 MG/DL (ref 8.6–10.5)
CHLORIDE SERPL-SCNC: 104 MMOL/L (ref 98–107)
CHLORIDE SERPL-SCNC: 104 MMOL/L (ref 98–107)
CHOLEST SERPL-MCNC: 125 MG/DL (ref 0–200)
CLOSE TME COLL+ADP + EPINEP PNL BLD: 93 % (ref 86–100)
CO2 SERPL-SCNC: 26 MMOL/L (ref 22–29)
CO2 SERPL-SCNC: 26.4 MMOL/L (ref 22–29)
CREAT SERPL-MCNC: 0.91 MG/DL (ref 0.76–1.27)
CREAT SERPL-MCNC: 0.93 MG/DL (ref 0.76–1.27)
DEPRECATED RDW RBC AUTO: 41.5 FL (ref 37–54)
DEPRECATED RDW RBC AUTO: 43 FL (ref 37–54)
EOSINOPHIL # BLD AUTO: 0.56 10*3/MM3 (ref 0–0.4)
EOSINOPHIL # BLD AUTO: 0.62 10*3/MM3 (ref 0–0.4)
EOSINOPHIL NFR BLD AUTO: 5.7 % (ref 0.3–6.2)
EOSINOPHIL NFR BLD AUTO: 7.1 % (ref 0.3–6.2)
ERYTHROCYTE [DISTWIDTH] IN BLOOD BY AUTOMATED COUNT: 12.5 % (ref 12.3–15.4)
ERYTHROCYTE [DISTWIDTH] IN BLOOD BY AUTOMATED COUNT: 12.6 % (ref 12.3–15.4)
GFR SERPL CREATININE-BSD FRML MDRD: 80 ML/MIN/1.73
GFR SERPL CREATININE-BSD FRML MDRD: 82 ML/MIN/1.73
GLOBULIN UR ELPH-MCNC: 2.5 GM/DL
GLUCOSE BLDC GLUCOMTR-MCNC: 143 MG/DL (ref 70–130)
GLUCOSE BLDC GLUCOMTR-MCNC: 181 MG/DL (ref 70–130)
GLUCOSE BLDC GLUCOMTR-MCNC: 245 MG/DL (ref 70–130)
GLUCOSE SERPL-MCNC: 168 MG/DL (ref 65–99)
GLUCOSE SERPL-MCNC: 202 MG/DL (ref 65–99)
HBA1C MFR BLD: 8.11 % (ref 4.8–5.6)
HCT VFR BLD AUTO: 44.3 % (ref 37.5–51)
HCT VFR BLD AUTO: 44.6 % (ref 37.5–51)
HDLC SERPL-MCNC: 58 MG/DL (ref 40–60)
HGB BLD-MCNC: 15 G/DL (ref 13–17.7)
HGB BLD-MCNC: 15.7 G/DL (ref 13–17.7)
IMM GRANULOCYTES # BLD AUTO: 0.01 10*3/MM3 (ref 0–0.05)
IMM GRANULOCYTES # BLD AUTO: 0.04 10*3/MM3 (ref 0–0.05)
IMM GRANULOCYTES NFR BLD AUTO: 0.1 % (ref 0–0.5)
IMM GRANULOCYTES NFR BLD AUTO: 0.4 % (ref 0–0.5)
INR PPP: 1 (ref 0.9–1.1)
LDLC SERPL CALC-MCNC: 51 MG/DL (ref 0–100)
LDLC/HDLC SERPL: 0.88 {RATIO}
LYMPHOCYTES # BLD AUTO: 1.64 10*3/MM3 (ref 0.7–3.1)
LYMPHOCYTES # BLD AUTO: 1.98 10*3/MM3 (ref 0.7–3.1)
LYMPHOCYTES NFR BLD AUTO: 16.5 % (ref 19.6–45.3)
LYMPHOCYTES NFR BLD AUTO: 22.7 % (ref 19.6–45.3)
MAGNESIUM SERPL-MCNC: 2.3 MG/DL (ref 1.6–2.4)
MCH RBC QN AUTO: 30.9 PG (ref 26.6–33)
MCH RBC QN AUTO: 32.8 PG (ref 26.6–33)
MCHC RBC AUTO-ENTMCNC: 33.9 G/DL (ref 31.5–35.7)
MCHC RBC AUTO-ENTMCNC: 35.2 G/DL (ref 31.5–35.7)
MCV RBC AUTO: 91.3 FL (ref 79–97)
MCV RBC AUTO: 93.1 FL (ref 79–97)
MONOCYTES # BLD AUTO: 0.85 10*3/MM3 (ref 0.1–0.9)
MONOCYTES # BLD AUTO: 0.96 10*3/MM3 (ref 0.1–0.9)
MONOCYTES NFR BLD AUTO: 9.7 % (ref 5–12)
MONOCYTES NFR BLD AUTO: 9.7 % (ref 5–12)
NEUTROPHILS NFR BLD AUTO: 5.18 10*3/MM3 (ref 1.7–7)
NEUTROPHILS NFR BLD AUTO: 59.3 % (ref 42.7–76)
NEUTROPHILS NFR BLD AUTO: 6.63 10*3/MM3 (ref 1.7–7)
NEUTROPHILS NFR BLD AUTO: 66.9 % (ref 42.7–76)
NRBC BLD AUTO-RTO: 0 /100 WBC (ref 0–0.2)
NRBC BLD AUTO-RTO: 0 /100 WBC (ref 0–0.2)
PLATELET # BLD AUTO: 203 10*3/MM3 (ref 140–450)
PLATELET # BLD AUTO: 203 10*3/MM3 (ref 140–450)
PMV BLD AUTO: 9.3 FL (ref 6–12)
PMV BLD AUTO: 9.4 FL (ref 6–12)
POTASSIUM SERPL-SCNC: 4.2 MMOL/L (ref 3.5–5.2)
POTASSIUM SERPL-SCNC: 4.2 MMOL/L (ref 3.5–5.2)
PROT SERPL-MCNC: 7.3 G/DL (ref 6–8.5)
PROTHROMBIN TIME: 13 SECONDS (ref 11.7–14.2)
QT INTERVAL: 414 MS
QTC INTERVAL: 416 MS
RBC # BLD AUTO: 4.79 10*6/MM3 (ref 4.14–5.8)
RBC # BLD AUTO: 4.85 10*6/MM3 (ref 4.14–5.8)
SODIUM SERPL-SCNC: 137 MMOL/L (ref 136–145)
SODIUM SERPL-SCNC: 140 MMOL/L (ref 136–145)
TRIGL SERPL-MCNC: 80 MG/DL (ref 0–150)
VLDLC SERPL-MCNC: 16 MG/DL (ref 5–40)
WBC # BLD AUTO: 8.74 10*3/MM3 (ref 3.4–10.8)
WBC # BLD AUTO: 9.91 10*3/MM3 (ref 3.4–10.8)

## 2021-11-12 PROCEDURE — 85576 BLOOD PLATELET AGGREGATION: CPT | Performed by: NURSE PRACTITIONER

## 2021-11-12 PROCEDURE — 80061 LIPID PANEL: CPT | Performed by: NURSE PRACTITIONER

## 2021-11-12 PROCEDURE — 99024 POSTOP FOLLOW-UP VISIT: CPT | Performed by: THORACIC SURGERY (CARDIOTHORACIC VASCULAR SURGERY)

## 2021-11-12 PROCEDURE — 63710000001 INSULIN ASPART PER 5 UNITS: Performed by: INTERNAL MEDICINE

## 2021-11-12 PROCEDURE — 85025 COMPLETE CBC W/AUTO DIFF WBC: CPT | Performed by: NURSE PRACTITIONER

## 2021-11-12 PROCEDURE — 83735 ASSAY OF MAGNESIUM: CPT | Performed by: NURSE PRACTITIONER

## 2021-11-12 PROCEDURE — 83036 HEMOGLOBIN GLYCOSYLATED A1C: CPT | Performed by: NURSE PRACTITIONER

## 2021-11-12 PROCEDURE — 85730 THROMBOPLASTIN TIME PARTIAL: CPT | Performed by: NURSE PRACTITIONER

## 2021-11-12 PROCEDURE — 80053 COMPREHEN METABOLIC PANEL: CPT | Performed by: INTERNAL MEDICINE

## 2021-11-12 PROCEDURE — 25010000002 ENOXAPARIN PER 10 MG: Performed by: INTERNAL MEDICINE

## 2021-11-12 PROCEDURE — 82962 GLUCOSE BLOOD TEST: CPT

## 2021-11-12 PROCEDURE — 85610 PROTHROMBIN TIME: CPT | Performed by: NURSE PRACTITIONER

## 2021-11-12 RX ORDER — DEXTROSE MONOHYDRATE 25 G/50ML
25 INJECTION, SOLUTION INTRAVENOUS
Status: DISCONTINUED | OUTPATIENT
Start: 2021-11-12 | End: 2021-11-13

## 2021-11-12 RX ORDER — LORAZEPAM 1 MG/1
1 TABLET ORAL EVERY 6 HOURS PRN
Status: DISCONTINUED | OUTPATIENT
Start: 2021-11-12 | End: 2021-11-16

## 2021-11-12 RX ORDER — NICOTINE POLACRILEX 4 MG
15 LOZENGE BUCCAL
Status: DISCONTINUED | OUTPATIENT
Start: 2021-11-12 | End: 2021-11-13

## 2021-11-12 RX ORDER — ATORVASTATIN CALCIUM 20 MG/1
20 TABLET, FILM COATED ORAL NIGHTLY
Status: DISCONTINUED | OUTPATIENT
Start: 2021-11-12 | End: 2021-11-16

## 2021-11-12 RX ORDER — CARVEDILOL 3.12 MG/1
3.12 TABLET ORAL 2 TIMES DAILY WITH MEALS
Start: 2021-11-12 | End: 2021-11-20 | Stop reason: HOSPADM

## 2021-11-12 RX ORDER — ASPIRIN 325 MG
325 TABLET ORAL DAILY
Status: DISCONTINUED | OUTPATIENT
Start: 2021-11-13 | End: 2021-11-13

## 2021-11-12 RX ORDER — SODIUM CHLORIDE 0.9 % (FLUSH) 0.9 %
10 SYRINGE (ML) INJECTION EVERY 12 HOURS SCHEDULED
Status: DISCONTINUED | OUTPATIENT
Start: 2021-11-12 | End: 2021-11-16

## 2021-11-12 RX ORDER — SODIUM CHLORIDE 0.9 % (FLUSH) 0.9 %
10 SYRINGE (ML) INJECTION AS NEEDED
Status: DISCONTINUED | OUTPATIENT
Start: 2021-11-12 | End: 2021-11-16

## 2021-11-12 RX ORDER — ONDANSETRON 2 MG/ML
4 INJECTION INTRAMUSCULAR; INTRAVENOUS EVERY 6 HOURS PRN
Status: DISCONTINUED | OUTPATIENT
Start: 2021-11-12 | End: 2021-11-16

## 2021-11-12 RX ORDER — NITROGLYCERIN 0.4 MG/1
0.4 TABLET SUBLINGUAL
Status: DISCONTINUED | OUTPATIENT
Start: 2021-11-12 | End: 2021-11-15 | Stop reason: SDUPTHER

## 2021-11-12 RX ORDER — LISINOPRIL 5 MG/1
2.5 TABLET ORAL
Start: 2021-11-13 | End: 2021-11-20 | Stop reason: HOSPADM

## 2021-11-12 RX ADMIN — ACETYLCYSTEINE 600 MG: 100 SOLUTION ORAL; RESPIRATORY (INHALATION) at 08:02

## 2021-11-12 RX ADMIN — ATORVASTATIN CALCIUM 10 MG: 10 TABLET, FILM COATED ORAL at 08:02

## 2021-11-12 RX ADMIN — INSULIN ASPART 2 UNITS: 100 INJECTION, SOLUTION INTRAVENOUS; SUBCUTANEOUS at 08:02

## 2021-11-12 RX ADMIN — INSULIN ASPART 4 UNITS: 100 INJECTION, SOLUTION INTRAVENOUS; SUBCUTANEOUS at 11:18

## 2021-11-12 RX ADMIN — SODIUM CHLORIDE, PRESERVATIVE FREE 10 ML: 5 INJECTION INTRAVENOUS at 08:02

## 2021-11-12 RX ADMIN — ASPIRIN 324 MG: 81 TABLET, CHEWABLE ORAL at 08:12

## 2021-11-12 RX ADMIN — LISINOPRIL 5 MG: 5 TABLET ORAL at 08:02

## 2021-11-12 RX ADMIN — SODIUM CHLORIDE 100 ML/HR: 9 INJECTION, SOLUTION INTRAVENOUS at 08:03

## 2021-11-12 RX ADMIN — FAMOTIDINE 20 MG: 10 INJECTION INTRAVENOUS at 08:13

## 2021-11-12 RX ADMIN — ENOXAPARIN SODIUM 80 MG: 80 INJECTION SUBCUTANEOUS at 08:02

## 2021-11-12 NOTE — DISCHARGE SUMMARY
[facility] Medicine Services  DISCHARGE SUMMARY       Date of Admission: 11/10/2021  Date of Discharge:  11/12/2021  Primary Care Physician: Jairo Curtis MD    Presenting Problem/History of Present Illness:  Chest pain, unspecified type [R07.9]       Final Discharge Diagnoses:    Chest pain    Non Q wave myocardial infarction (HCC)    HTN    HLD    DMII    Consults:   Consults     Date and Time Order Name Status Description    11/10/2021  7:29 AM Inpatient Cardiology Consult Completed           Procedures Performed: Procedure(s):  Left Heart Cath                Pertinent Test Results:   Adult Transthoracic Echo Complete W/ Cont if Necessary Per Protocol    Result Date: 11/10/2021  · Mildly decreased posterior leaflet mobility. · Left ventricular wall thickness is consistent with mild concentric hypertrophy. · Left ventricular ejection fraction appears to be 56 - 60%. · Left ventricular diastolic function was normal.      Cardiac Catheterization/Vascular Study    Result Date: 11/11/2021  Cardiac Catheterization Operative Report Thai Wu [id number] 11/11/2021 @PCP@ Reason for the procedure: Chest pain class III angina pectoris non-Q myocardial infarction Procedure performed: Left heart catheterization with the left ventriculogram and iliofemoral arteriogram and Angio-Seal closure device. Procedure Details The risks, benefits, complications, treatment options, and expected outcomes were discussed with the patient. The patient and/or family concurred with the proposed plan, giving informed consent. Patient was brought to the cath lab after IV hydration was begun and oral premedication was given. He was further sedated with midazolam. He was prepped and draped in the usual manner.  Using 1% local lidocaine infiltration, a 6-Divehi introducer sheath was inserted using the modified Seldinger technique in the right femoral artery without any difficulty. Using  a 0.038 guidewire over a 6-Peruvian, a right Elma catheter was then advanced under fluoroscopic guidance up to the right coronary artery cusp. With minimal manipulation, the right coronary artery was cannulated and a selective angiogram was performed, using multiple views of the right coronary artery. The right Elma catheter was exchanged for the left Elma catheter and advanced under fluoroscopic guidance up to the right coronary cusp. With minimal manipulation, the left coronary artery was cannulated and a selective angiogram of the left coronary artery was performed, using right and the left VILLAGOMEZ, Czech, and PA cranial-caudal views. The 6-Peruvian pigtail catheter was then advanced under fluoroscopic guidance over a 0.038 wire up to the level of the aortic root. With minimal manipulation, the pigtail catheter was advanced into the left ventricle, and hemodynamics were performed and a left ventriculogram was performed. Hemodynamic Aortic pressure: 109/54 Mean: 74 Left ventricular pressure: 109/12 Left ventriculogram: Preserved left ventricular systolic function with an ejection fraction of 55%. Coronaries: Right dominant system. Left Main coronary artery: Left main coronary artery towards the distal and had evidence of luminal irregularity.  The ostium of the left main coronary artery had luminal irregularity.  The left main coronary artery bifurcated into the left anterior descending artery and circumflex artery. Left anterior descending artery: Left anterior descending artery was a medium caliber vessel which in the proximal portion had evidence of calcification with 20 to 30% stenosis.  The midportion of the left anterior descending artery after the origin of the second diagonal branch had a concentric 70 to 80% calcified stenosis.  The second diagonal branch towards the ostium had calcification with up to 60 to 70% stenosis.  The diagonal branch in the left anterior descending artery a fair caliber vessel  amiable to bypass grafting. Circumflex artery: Circumflex artery was a medium caliber calcified vessel with the first obtuse marginal branch which was not a large caliber vessel which in the proximal portion had up to 60 to 70% stenosis.  After the origin of the first obtuse marginal branch there was a calcified irregular stenosis of up to 95 to 99% before the origin of the second major obtuse marginal branch.  The second obtuse marginal branch in the proximal portion had 30 to 40% stenosis and was also calcified.  The distal circumflex artery was free of any obstructive stenosis with good MOSES-3 flow Right coronary artery: The right coronary artery was a medium caliber dominant vessel which was calcified in the proximal and mid and distal portion.  The distal right coronary artery bifurcated into the posterior descending and posterolateral branch.  The posterior descending artery towards the ostium had calcification with the plaque with up to 40% stenosis.  The posterolateral branch was free of any obstructive stenosis with good MOSES-3 flow. Estimated Blood Loss:  Minimal Specimens: None      Complications:  None; patient tolerated the procedure well.  Impression A.  Mid left anterior descending artery with 70 to 80%  stenosis with 60 to 70% stenosis in the second diagonal branch with a left anterior descending artery which was calcified. B.  Proximal circumflex artery calcification with up to 90 to 95% stenosis before the origin of the second obtuse marginal branch. C.  First obtuse marginal branch with 70% stenosis which was not a large caliber vessel. D.  Calcified right coronary artery with a posterior descending artery with 60% stenosis towards ostium. E.  Preserved left ventricular systolic function with an ejection fraction of 55%.  Recommendations: Patient was recommended due to the patient history of diabetes with calcified left anterior descending artery and circumflex artery and minimal plaquing in the  "distal left main evaluation for direct revascularization. Arterial sheath was pulled and closed with an Angio-Seal closure device and patient was transferred to the floor in stable condition.     Electronically signed by Leroy Parker MD, 11/11/21, 9:28 AM CST. Dictated utilizing Dragon dictation.    XR Chest 1 View    Result Date: 11/10/2021  EXAM: XR CHEST 1 VIEW HISTORY: chest pain COMPARISON: TECHNIQUE: Portable view of the chest. FINDINGS: Mild left lung base atelectasis versus infiltrate. Mild cardiomegaly. The right lung remains well aerated. There is no significant pleural effusion. The mediastinal contours are within normal limits. . No evidence of mediastinal shift or pneumothorax. Unremarkable visualized osseous structures.     1.  Mild left lung base atelectasis versus infiltrate. 2.  Mild cardiomegaly. Electronically signed by:  Eduard Ball MD  11/10/2021 5:39 AM CST Workstation: [id number]      HPI/Hospital Course:  The patient is a 71 y.o. male with a history of HTN, DMII, and scleroderma who presented to [facility] with chest tightness. Cardiac enzymes trended up.  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  He has been accepted by CT surgery at [facility] by Dr. Schwab.     Condition on Discharge:  Stable for transfer    Physical Exam on Discharge:  /59 (BP Location: Left arm, Patient Position: Sitting)   Pulse 76   Temp 97.4 °F (36.3 °C) (Temporal)   Resp 16   Ht 180.3 cm (70.98\")   Wt 82.3 kg (181 lb 6.4 oz)   SpO2 98%   BMI 25.31 kg/m²   Physical Exam  Vitals reviewed.   Constitutional:       General: He is not in acute distress.     Appearance: Normal appearance.   HENT:      Head: Normocephalic and atraumatic.   Cardiovascular:      Rate and Rhythm: Normal rate and regular rhythm.      Pulses: Normal pulses.   Pulmonary:      Effort: Pulmonary effort is normal.      Breath sounds: Normal breath " sounds.   Abdominal:      General: There is no distension.      Palpations: Abdomen is soft.      Tenderness: There is no abdominal tenderness.   Musculoskeletal:         General: No swelling or deformity.   Skin:     General: Skin is warm and dry.   Neurological:      General: No focal deficit present.      Mental Status: He is alert and oriented to person, place, and time.     Discharge Disposition:  [facility] Hospital (DC - External)    Discharge Medications:     Discharge Medications      New Medications      Instructions Start Date   carvedilol 3.125 MG tablet  Commonly known as: Coreg   3.125 mg, Oral, 2 Times Daily With Meals      enoxaparin 80 MG/0.8ML solution syringe  Commonly known as: LOVENOX   1 mg/kg (80 mg), Subcutaneous, Every 12 Hours         Changes to Medications      Instructions Start Date   lisinopril 5 MG tablet  Commonly known as: PRINIVILZESTRIL  What changed: See the new instructions.   2.5 mg, Oral, Every 24 Hours Scheduled   Start Date: November 13, 2021        Continue These Medications      Instructions Start Date   aspirin 81 MG chewable tablet   81 mg, Oral, Nightly      glimepiride 2 MG tablet  Commonly known as: AMARYL   2 mg, Oral, Every Morning Before Breakfast      hydroxychloroquine 200 MG tablet  Commonly known as: PLAQUENIL   200 mg, Oral, Nightly      multivitamin with minerals tablet tablet   1 tablet, Oral, Daily      simvastatin 20 MG tablet  Commonly known as: ZOCOR   simvastatin 20 mg tablet      sitaGLIPtin-metFORMIN  MG per tablet  Commonly known as: JANUMET   1 tablet, Oral, 2 Times Daily With Meals             Discharge Diet:   Diet Instructions     Diet: Regular, Cardiac, Consistent Carbohydrate      Discharge Diet:  Regular  Cardiac  Consistent Carbohydrate             Activity at Discharge:   Activity Instructions     Activity as Tolerated            Follow-up Appointments:   No future appointments.    Test Results Pending at Discharge:   Pending Labs      Order Current Status    Extra Tubes In process          Eric Padilla, APRN  11/12/21  13:39 CST    Time: Discharge planning and teaching took greater than 30 minutes.

## 2021-11-12 NOTE — NURSING NOTE
"Physicians Statement of Medical Necessity for  Ambulance Transportation    GENERAL INFORMATION     Name: Thai Wu  YOB: 1950  Medicare #: [id number]  Transport Date: 11/12/2021 (Valid for round trips this date, or for scheduled repetitive trips for 60 days from the date signed below.)  Origin: [facility] rm [address] Destination: [facility] rm [address]  Is the Patient's stay covered under Medicare Part A (PPS/DRG?)Yes  Closest appropriate facility? Yes  If this a hosp-hosp transfer? Yes, describe services needed at 2nd facility not available at 1st facility surgery not offered here  Is this a hospice patient? No    MEDICAL NECESSITY QUESTIONAIRE    Ambulance Transportation is medically necessary only if other means of transportation are contraindicated or would be potentially harmful to the patient.  To meet this requirement, the patient must be either \"bed confined\" or suffer from a condition such that transport by means other than an ambulance is contraindicated by the patient's condition.  The following questions must be answered by the healthcare professional signing below for this form to be valid:     1) Describe the MEDICAL CONDITION (physical and/or mental) of this patient AT THE TIME OF AMBULANCE TRANSPORT that requires the patient to be transported in an ambulance, and why transport by other means is contraindicated by the patient's condition:         2) Is this patient \"bed confined\" as defined below?No    To be \"bed confined\" the patient must satisfy all three of the following criteria:  (1) unable to get up from bed without assistance; AND (2) unable to ambulate;  AND (3) unable to sit in a chair or wheelchair.  3) Can this patient safely be transported by car or wheelchair van (I.e., may safely sit during transport, without an attendant or monitoring?)Yes  4. In addition to completing questions 1-3 above, please check any of the following conditions that apply*:          " *Note: supporting documentation for any boxes checked must be maintained in the patient's medical records IV meds/fluids required, Medical attendant required and Cardiac monitoring required en route      SIGNATURE OF PHYSICIAN OR OTHER AUTHORIZED HEALTHCARE PROFESSIONAL    I certify that the above information is true and correct based on my evaluation of this patient, and represent that the patient requires transport by ambulance and that other forms of transport are contraindicated.  I understand that this information will be used by the Centers for Medicare and Medicaid Services (CMS) to support the determiniation of medical necessity for ambulance services, and I represent that I have personal knowledge of the patient's condition at the time of transport.       If this box is checked, I also certify that the patient is physically or mentally incapable of signing the ambulance service's claim form and that the institution with which I am affiliated has furnished care, services or assistance to the patient.  My signature below is made on behalf of the patient pursuant to 42 .36(b)(4). In accordance with 42 .37, the specific reason(s) that the patient is physically or mentally incapable of signing the claim for is as follows:    Signature of Physician or Healthcare Professional   Any Sanders RN Date/Time:   11/12/2021 1426     (For Scheduled repetitive transport, this form is not valid for transports performed more than 60 days after this date).                                                                                                                                            --------------------------------------------------------------------------------------------  Printed Name and Credentials of Physician or Authorized Healthcare Professional     *Form must be signed by patient's attending physician for scheduled, repetitive transports,.  For non-repetitive ambulance transports, if unable to  obtain the signature of the attending physician, any of the following may sign (please select below):     Physician  Clinical Nurse Specialist  Registered Nurse     Physician Assistant  Discharge Planner  Licensed Practical Nurse     Nurse Practitioner   x

## 2021-11-12 NOTE — PROGRESS NOTES
[facility] Medicine Services  INPATIENT PROGRESS NOTE    Length of Stay: 1  Date of Admission: 11/10/2021  Primary Care Physician: Jairo Curtis MD    Subjective   Chief Complaint: Chest tightness  HPI:  71 year ld male with a history of HTN, DMII, and scleroderma who presented with chest tightness. Cardiac enzymes trended up.  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  He is awaiting bed availability at [facility].     Review of Systems   Constitutional: Negative for chills and fever.   Respiratory: Negative for shortness of breath.    Cardiovascular: Negative for chest pain.   Gastrointestinal: Negative for abdominal pain, nausea and vomiting.   All other systems reviewed and are negative.       All pertinent negatives and positives are as above. All other systems have been reviewed and are negative unless otherwise stated.     Objective    Temp:  [97.3 °F (36.3 °C)-97.8 °F (36.6 °C)] 97.4 °F (36.3 °C)  Heart Rate:  [56-81] 67  Resp:  [15-16] 16  BP: (100-164)/(50-74) 111/59    Physical Exam  Vitals reviewed.   Constitutional:       General: He is not in acute distress.     Appearance: Normal appearance.   HENT:      Head: Normocephalic and atraumatic.   Cardiovascular:      Rate and Rhythm: Normal rate and regular rhythm.      Pulses: Normal pulses.   Pulmonary:      Effort: Pulmonary effort is normal.      Breath sounds: Normal breath sounds.   Abdominal:      General: There is no distension.      Palpations: Abdomen is soft.      Tenderness: There is no abdominal tenderness.   Musculoskeletal:         General: No swelling or deformity.   Skin:     General: Skin is warm and dry.   Neurological:      General: No focal deficit present.      Mental Status: He is alert and oriented to person, place, and time.         Results Review:  I have reviewed the labs, radiology results, and  diagnostic studies.    Laboratory Data:   Results from last 7 days   Lab Units 11/12/21  0541 11/11/21  0526 11/10/21  0513   SODIUM mmol/L 137 135* 141   POTASSIUM mmol/L 4.2 4.4 4.5   CHLORIDE mmol/L 104 101 102   CO2 mmol/L 26.0 27.0 28.0   BUN mg/dL 14 16 19   CREATININE mg/dL 0.91 1.06 0.99   GLUCOSE mg/dL 202* 218* 203*   CALCIUM mg/dL 8.9 8.8 9.7   BILIRUBIN mg/dL  --  0.4 0.3   ALK PHOS U/L  --  42 53   ALT (SGPT) U/L  --  15 17   AST (SGOT) U/L  --  15 16   ANION GAP mmol/L 7.0 7.0 11.0     Estimated Creatinine Clearance: 84.2 mL/min (by C-G formula based on SCr of 0.91 mg/dL).          Results from last 7 days   Lab Units 11/12/21  0541 11/11/21  0526 11/10/21  0513   WBC 10*3/mm3 9.91 8.00 8.19   HEMOGLOBIN g/dL 15.0 14.3 16.0   HEMATOCRIT % 44.3 41.9 45.9   PLATELETS 10*3/mm3 203 182 208     Results from last 7 days   Lab Units 11/11/21  0638   INR  1.06       Culture Data:   No results found for: BLOODCX  No results found for: URINECX  No results found for: RESPCX  No results found for: WOUNDCX  No results found for: STOOLCX  No components found for: BODYFLD    Radiology Data:   Imaging Results (Last 24 Hours)     ** No results found for the last 24 hours. **          I have reviewed the patient's current medications.     Assessment/Plan     Active Hospital Problems    Diagnosis    • Chest pain    • Non Q wave myocardial infarction (HCC)      Added automatically from request for surgery [id number]     HTN  HLD  DMII    Plan:    Consideration for CABG, Cardiology discussed with Dr. Dumont at [facility] who accepted the patient.  He is awaiting bed availability.   Aspirin  Statin  Continue anticoagulation with Lovenox  Glucose control: SSI 0-9 units  BP control: lisinopril  Discharge planning: transfer to [facility] for CABG when bed available    I confirmed that the patient's Advance Care Plan is present, code status is documented, or surrogate decision maker is listed in the patient's  medical record.     The patient was evaluated during the global COVID-19 pandemic, and the diagnosis was suspected/considered upon their initial presentation.  Evaluation, treatment, and testing were consistent with current guidelines for patients who present with complaints or symptoms that may be related to COVID-19.  .        This document has been electronically signed by CLAUDIA Sin on November 12, 2021 09:48 CST

## 2021-11-12 NOTE — DISCHARGE PLACEMENT REQUEST
"Thai Qureshi (71 y.o. Male)             Date of Birth Social Security Number Address Home Phone MRN    1950  [address] [address] [phone] [id number]    Taoism Marital Status             None        Admission Date Admission Type Admitting Provider Attending Provider Department, Room/Bed    11/10/21 Emergency Renan Carcamo MD Echendu, Anthony W, MD [address], [address]    Discharge Date Discharge Disposition Discharge Destination           [facility] (DC - External)              Attending Provider: Saravanan Acosta MD    Allergies: No Known Allergies    Isolation: None   Infection: None   Code Status: CPR   Advance Care Planning Activity    Ht: 180.3 cm (70.98\")   Wt: 82.3 kg (181 lb 6.4 oz)    Admission Cmt: None   Principal Problem: None                Active Insurance as of 11/10/2021     Primary Coverage     Payor Plan Insurance Group Employer/Plan Group    MEDICARE MEDICARE A & B      Payor Plan Address Payor Plan Phone Number Payor Plan Fax Number Effective Dates    [address] [phone]  1/1/2013 - None Entered    [facility] [place] [id number]       Subscriber Name Subscriber Birth Date Member ID       THAI QURESHI 1950 [id number]           Secondary Coverage     Payor Plan Insurance Group Employer/Plan Group    [employer]IONS                Payor Plan Address Payor Plan Phone Number Payor Plan Fax Number Effective Dates    [address] [address]   1/1/2018 - None Entered    [facility] [address]       Subscriber Name Subscriber Birth Date Member ID       THAI QURESHI 1950 [id number]                 Emergency Contacts      (Rel.) Home Phone Work Phone Mobile Phone    VANESSA QURESHI (Spouse) [phone] -- [phone]            Insurance Information                MEDICARE/MEDICARE A & B Phone: [phone]    Subscriber: Thai Qureshi Subscriber#: [id number]    Group#: -- " Precert#: --        KAMRYN[employer]P SOLUTIONS           Phone: --    Subscriber: Thai Wu Subscriber#: [id number]    Group#: -- Precert#: --

## 2021-11-13 ENCOUNTER — APPOINTMENT (OUTPATIENT)
Dept: CARDIOLOGY | Facility: HOSPITAL | Age: 71
End: 2021-11-13

## 2021-11-13 ENCOUNTER — APPOINTMENT (OUTPATIENT)
Dept: GENERAL RADIOLOGY | Facility: HOSPITAL | Age: 71
End: 2021-11-13

## 2021-11-13 PROBLEM — M34.9 SCLERODERMA (HCC): Status: ACTIVE | Noted: 2021-11-13

## 2021-11-13 PROBLEM — I10 ESSENTIAL HYPERTENSION: Status: ACTIVE | Noted: 2021-11-13

## 2021-11-13 PROBLEM — E11.59 TYPE 2 DIABETES MELLITUS WITH CIRCULATORY DISORDER (HCC): Status: ACTIVE | Noted: 2021-11-13

## 2021-11-13 LAB
ARTERIAL PATENCY WRIST A: POSITIVE
ATMOSPHERIC PRESS: 751.1 MMHG
BASE EXCESS BLDA CALC-SCNC: 0.8 MMOL/L (ref 0–2)
BDY SITE: ABNORMAL
BH CV XLRA MEAS - DIST GSV CALF DIST LEFT: 0.2 CM
BH CV XLRA MEAS - DIST GSV CALF DIST RIGHT: 0.1 CM
BH CV XLRA MEAS - DIST GSV THIGH DIST LEFT: 0.19 CM
BH CV XLRA MEAS - DIST GSV THIGH DIST RIGHT: 0.22 CM
BH CV XLRA MEAS - DIST LSV CALF DIST LEFT: 0.28 CM
BH CV XLRA MEAS - GSV ANKLE DIST LEFT: 0.19 CM
BH CV XLRA MEAS - GSV ANKLE DIST RIGHT: 0.1 CM
BH CV XLRA MEAS - GSV KNEE DIST LEFT: 0.11 CM
BH CV XLRA MEAS - GSV KNEE DIST RIGHT: 0.16 CM
BH CV XLRA MEAS - GSV ORIGIN DIST LEFT: 1 CM
BH CV XLRA MEAS - GSV ORIGIN DIST RIGHT: 0.8 CM
BH CV XLRA MEAS - MID GSV CALF LEFT: 0.28 CM
BH CV XLRA MEAS - MID GSV CALF RIGHT: 0.13 CM
BH CV XLRA MEAS - MID GSV THIGH  LEFT: 0.6 CM
BH CV XLRA MEAS - MID GSV THIGH  RIGHT: 0.21 CM
BH CV XLRA MEAS - MID LSV CALF DIST LEFT: 0.27 CM
BH CV XLRA MEAS - PROX GSV CALF DIST LEFT: 0.13 CM
BH CV XLRA MEAS - PROX GSV CALF DIST RIGHT: 0.12 CM
BH CV XLRA MEAS - PROX GSV THIGH  LEFT: 0.51 CM
BH CV XLRA MEAS - PROX GSV THIGH  RIGHT: 0.21 CM
BH CV XLRA MEAS - PROX LSV CALF DIST LEFT: 0.25 CM
BH CV XLRA MEAS LEFT CCA RATIO VEL: -81.2 CM/SEC
BH CV XLRA MEAS LEFT DIST CCA EDV: -24.1 CM/SEC
BH CV XLRA MEAS LEFT DIST CCA PSV: -81.2 CM/SEC
BH CV XLRA MEAS LEFT DIST ICA EDV: -24.7 CM/SEC
BH CV XLRA MEAS LEFT DIST ICA PSV: -77.9 CM/SEC
BH CV XLRA MEAS LEFT ICA RATIO VEL: -82 CM/SEC
BH CV XLRA MEAS LEFT ICA/CCA RATIO: 1
BH CV XLRA MEAS LEFT MID ICA EDV: -27.3 CM/SEC
BH CV XLRA MEAS LEFT MID ICA PSV: -81.4 CM/SEC
BH CV XLRA MEAS LEFT PROX CCA EDV: 29.1 CM/SEC
BH CV XLRA MEAS LEFT PROX CCA PSV: 129.8 CM/SEC
BH CV XLRA MEAS LEFT PROX ECA EDV: -12.6 CM/SEC
BH CV XLRA MEAS LEFT PROX ECA PSV: -81.2 CM/SEC
BH CV XLRA MEAS LEFT PROX ICA EDV: -21.7 CM/SEC
BH CV XLRA MEAS LEFT PROX ICA PSV: -82 CM/SEC
BH CV XLRA MEAS LEFT PROX SCLA PSV: 137.7 CM/SEC
BH CV XLRA MEAS LEFT VERTEBRAL A EDV: -14.1 CM/SEC
BH CV XLRA MEAS LEFT VERTEBRAL A PSV: -45.9 CM/SEC
BH CV XLRA MEAS RIGHT CCA RATIO VEL: -67.8 CM/SEC
BH CV XLRA MEAS RIGHT DIST CCA EDV: -14.3 CM/SEC
BH CV XLRA MEAS RIGHT DIST CCA PSV: -67.8 CM/SEC
BH CV XLRA MEAS RIGHT DIST ICA EDV: -20.3 CM/SEC
BH CV XLRA MEAS RIGHT DIST ICA PSV: -77.9 CM/SEC
BH CV XLRA MEAS RIGHT ICA RATIO VEL: -111 CM/SEC
BH CV XLRA MEAS RIGHT ICA/CCA RATIO: 1.6
BH CV XLRA MEAS RIGHT MID ICA EDV: 25.2 CM/SEC
BH CV XLRA MEAS RIGHT MID ICA PSV: 71.9 CM/SEC
BH CV XLRA MEAS RIGHT PROX CCA EDV: -18 CM/SEC
BH CV XLRA MEAS RIGHT PROX CCA PSV: -96.3 CM/SEC
BH CV XLRA MEAS RIGHT PROX ECA EDV: -11.2 CM/SEC
BH CV XLRA MEAS RIGHT PROX ECA PSV: -114.2 CM/SEC
BH CV XLRA MEAS RIGHT PROX ICA EDV: -19.1 CM/SEC
BH CV XLRA MEAS RIGHT PROX ICA PSV: -110.9 CM/SEC
BH CV XLRA MEAS RIGHT PROX SCLA PSV: 137.5 CM/SEC
BH CV XLRA MEAS RIGHT VERTEBRAL A EDV: -12.1 CM/SEC
BH CV XLRA MEAS RIGHT VERTEBRAL A PSV: -34.6 CM/SEC
BILIRUB UR QL STRIP: NEGATIVE
CLARITY UR: CLEAR
COLOR UR: YELLOW
GLUCOSE BLDC GLUCOMTR-MCNC: 104 MG/DL (ref 70–130)
GLUCOSE BLDC GLUCOMTR-MCNC: 172 MG/DL (ref 70–130)
GLUCOSE BLDC GLUCOMTR-MCNC: 184 MG/DL (ref 70–130)
GLUCOSE BLDC GLUCOMTR-MCNC: 214 MG/DL (ref 70–130)
GLUCOSE UR STRIP-MCNC: ABNORMAL MG/DL
HCO3 BLDA-SCNC: 25.1 MMOL/L (ref 22–28)
HGB UR QL STRIP.AUTO: NEGATIVE
KETONES UR QL STRIP: NEGATIVE
LEUKOCYTE ESTERASE UR QL STRIP.AUTO: NEGATIVE
MAXIMAL PREDICTED HEART RATE: 147 BPM
MODALITY: ABNORMAL
NITRITE UR QL STRIP: NEGATIVE
PCO2 BLDA: 38 MM HG (ref 35–45)
PH BLDA: 7.43 PH UNITS (ref 7.35–7.45)
PH UR STRIP.AUTO: 7 [PH] (ref 5–8)
PO2 BLDA: 76.1 MM HG (ref 80–100)
PROT UR QL STRIP: NEGATIVE
SAO2 % BLDCOA: 95.5 % (ref 92–99)
SARS-COV-2 ORF1AB RESP QL NAA+PROBE: NOT DETECTED
SP GR UR STRIP: 1.02 (ref 1–1.03)
STRESS TARGET HR: 125 BPM
TOTAL RATE: 20 BREATHS/MINUTE
UROBILINOGEN UR QL STRIP: ABNORMAL

## 2021-11-13 PROCEDURE — 82803 BLOOD GASES ANY COMBINATION: CPT

## 2021-11-13 PROCEDURE — 36600 WITHDRAWAL OF ARTERIAL BLOOD: CPT

## 2021-11-13 PROCEDURE — 25010000002 ENOXAPARIN PER 10 MG: Performed by: THORACIC SURGERY (CARDIOTHORACIC VASCULAR SURGERY)

## 2021-11-13 PROCEDURE — U0004 COV-19 TEST NON-CDC HGH THRU: HCPCS | Performed by: THORACIC SURGERY (CARDIOTHORACIC VASCULAR SURGERY)

## 2021-11-13 PROCEDURE — 63710000001 INSULIN LISPRO (HUMAN) PER 5 UNITS: Performed by: NURSE PRACTITIONER

## 2021-11-13 PROCEDURE — 82962 GLUCOSE BLOOD TEST: CPT

## 2021-11-13 PROCEDURE — 99222 1ST HOSP IP/OBS MODERATE 55: CPT | Performed by: THORACIC SURGERY (CARDIOTHORACIC VASCULAR SURGERY)

## 2021-11-13 PROCEDURE — 93880 EXTRACRANIAL BILAT STUDY: CPT

## 2021-11-13 PROCEDURE — 71046 X-RAY EXAM CHEST 2 VIEWS: CPT

## 2021-11-13 PROCEDURE — 81003 URINALYSIS AUTO W/O SCOPE: CPT | Performed by: NURSE PRACTITIONER

## 2021-11-13 PROCEDURE — 93970 EXTREMITY STUDY: CPT

## 2021-11-13 PROCEDURE — U0005 INFEC AGEN DETEC AMPLI PROBE: HCPCS | Performed by: THORACIC SURGERY (CARDIOTHORACIC VASCULAR SURGERY)

## 2021-11-13 RX ORDER — GLIPIZIDE 5 MG/1
2.5 TABLET ORAL
Status: DISCONTINUED | OUTPATIENT
Start: 2021-11-13 | End: 2021-11-14

## 2021-11-13 RX ORDER — CARVEDILOL 3.12 MG/1
3.12 TABLET ORAL 2 TIMES DAILY WITH MEALS
Status: DISCONTINUED | OUTPATIENT
Start: 2021-11-13 | End: 2021-11-16

## 2021-11-13 RX ORDER — INSULIN LISPRO 100 [IU]/ML
0-7 INJECTION, SOLUTION INTRAVENOUS; SUBCUTANEOUS
Status: DISCONTINUED | OUTPATIENT
Start: 2021-11-13 | End: 2021-11-16

## 2021-11-13 RX ORDER — DEXTROSE MONOHYDRATE 25 G/50ML
25 INJECTION, SOLUTION INTRAVENOUS
Status: DISCONTINUED | OUTPATIENT
Start: 2021-11-13 | End: 2021-11-16

## 2021-11-13 RX ORDER — ASPIRIN 81 MG/1
81 TABLET ORAL DAILY
Status: DISCONTINUED | OUTPATIENT
Start: 2021-11-13 | End: 2021-11-16

## 2021-11-13 RX ORDER — NICOTINE POLACRILEX 4 MG
15 LOZENGE BUCCAL
Status: DISCONTINUED | OUTPATIENT
Start: 2021-11-13 | End: 2021-11-16

## 2021-11-13 RX ORDER — LISINOPRIL 2.5 MG/1
2.5 TABLET ORAL
Status: DISCONTINUED | OUTPATIENT
Start: 2021-11-13 | End: 2021-11-13

## 2021-11-13 RX ADMIN — SODIUM CHLORIDE, PRESERVATIVE FREE 10 ML: 5 INJECTION INTRAVENOUS at 09:01

## 2021-11-13 RX ADMIN — ENOXAPARIN SODIUM 40 MG: 40 INJECTION SUBCUTANEOUS at 09:01

## 2021-11-13 RX ADMIN — ATORVASTATIN CALCIUM 20 MG: 20 TABLET, FILM COATED ORAL at 20:18

## 2021-11-13 RX ADMIN — ATORVASTATIN CALCIUM 20 MG: 20 TABLET, FILM COATED ORAL at 00:00

## 2021-11-13 RX ADMIN — SODIUM CHLORIDE, PRESERVATIVE FREE 10 ML: 5 INJECTION INTRAVENOUS at 20:18

## 2021-11-13 RX ADMIN — CARVEDILOL 3.12 MG: 3.12 TABLET, FILM COATED ORAL at 09:01

## 2021-11-13 RX ADMIN — ASPIRIN 81 MG: 81 TABLET, COATED ORAL at 09:01

## 2021-11-13 RX ADMIN — SODIUM CHLORIDE, PRESERVATIVE FREE 10 ML: 5 INJECTION INTRAVENOUS at 00:01

## 2021-11-13 RX ADMIN — GLIPIZIDE 2.5 MG: 5 TABLET ORAL at 10:59

## 2021-11-13 RX ADMIN — CARVEDILOL 3.12 MG: 3.12 TABLET, FILM COATED ORAL at 17:46

## 2021-11-13 RX ADMIN — LISINOPRIL 2.5 MG: 2.5 TABLET ORAL at 10:59

## 2021-11-13 RX ADMIN — INSULIN LISPRO 3 UNITS: 100 INJECTION, SOLUTION INTRAVENOUS; SUBCUTANEOUS at 11:43

## 2021-11-13 RX ADMIN — METOPROLOL TARTRATE 12.5 MG: 25 TABLET, FILM COATED ORAL at 00:00

## 2021-11-13 NOTE — H&P
[facility] H&P    Reason for Consultation: Transfer from [place] for possible surgical revascularization.    History of Present Illness:     There is a pleasant 71-year-old  gentleman with multiple comorbidities, including systemic hypertension, hyperlipidemia, non-insulin-dependent diabetes, scleroderma, and a strong family history of coronary artery disease.    Over the past 3 weeks he has been suffering from recurrent substernal chest discomfort. He initially brushed this off as indigestion. Eventually he suffered from radiation of the pain to his shoulder and neck. He presented at the emergency room for further evaluation. He was diagnosed with a non-ST elevation myocardial infarction based on troponins. He continued to have recurrent chest pain and eventually underwent left heart catheterization by Dr. Parker.     Left heart catheterization showed severe lesions in the LAD and circumflex system. The right coronary artery also has a borderline lesion at the ostium of the PDA. The left ventricular ejection fraction is estimated at 50%.    Transthoracic echocardiogram showed very mild mitral valve regurgitation, mild tricuspid valve regurgitation, and left ventricular ejection fraction over 50%.    Following a conversation between Dr. Parker And Dr. Schwab the patient was transferred to [facility] last evening for probable coronary bypass surgery.    Review of Systems   Constitutional: Positive for activity change. Negative for appetite change, chills, diaphoresis, fatigue, fever and unexpected weight change.   HENT: Negative for congestion, dental problem, facial swelling, hearing loss, mouth sores, nosebleeds, postnasal drip, rhinorrhea, sneezing, sore throat, trouble swallowing and voice change.    Eyes: Negative for photophobia, pain, discharge, redness, itching and visual disturbance.   Respiratory: Positive for chest tightness and shortness of breath. Negative for apnea, cough, choking and  stridor.    Cardiovascular: Positive for chest pain. Negative for palpitations and leg swelling.   Gastrointestinal: Negative for abdominal distention, abdominal pain, constipation, diarrhea, nausea and vomiting.   Endocrine: Negative for cold intolerance, heat intolerance, polydipsia, polyphagia and polyuria.   Genitourinary: Negative for difficulty urinating, dysuria, flank pain, hematuria and urgency.   Musculoskeletal: Negative for arthralgias, back pain, gait problem, joint swelling, myalgias, neck pain and neck stiffness.   Skin: Negative for color change, pallor, rash and wound.   Allergic/Immunologic: Negative for environmental allergies, food allergies and immunocompromised state.   Neurological: Negative for dizziness, tremors, seizures, syncope, facial asymmetry, speech difficulty, weakness, light-headedness, numbness and headaches.   Hematological: Negative for adenopathy. Does not bruise/bleed easily.   Psychiatric/Behavioral: Negative for agitation, behavioral problems, confusion, decreased concentration, dysphoric mood, hallucinations, self-injury, sleep disturbance and suicidal ideas. The patient is not nervous/anxious and is not hyperactive.         Past Medical History:   Diagnosis Date   • Hypertension    • Scleroderma (HCC)    • Type 2 diabetes mellitus (HCC)      Past Surgical History:   Procedure Laterality Date   • CARDIAC CATHETERIZATION Left 11/11/2021    Procedure: Left Heart Cath;  Surgeon: Leroy Parker MD;  Location: [facility] CATH INVASIVE LOCATION;  Service: Cardiology;  Laterality: Left;   • HERNIA REPAIR       No family history on file.  Social History     Tobacco Use   • Smoking status: Never Smoker   • Smokeless tobacco: Former User   Substance Use Topics   • Alcohol use: Yes     Comment: socially   • Drug use: Never     Medications Prior to Admission   Medication Sig Dispense Refill Last Dose   • aspirin 81 MG chewable tablet Chew 81 mg Every Night.   11/12/2021 at Unknown time    • glimepiride (AMARYL) 2 MG tablet Take 2 mg by mouth Every Morning Before Breakfast.   11/12/2021 at Unknown time   • hydroxychloroquine (PLAQUENIL) 200 MG tablet Take 200 mg by mouth Every Night.   11/11/2021 at Unknown time   • lisinopril (PRINIVIL,ZESTRIL) 5 MG tablet Take 0.5 tablets by mouth Daily.   11/12/2021 at Unknown time   • simvastatin (ZOCOR) 20 MG tablet simvastatin 20 mg tablet   11/11/2021 at Unknown time   • sitaGLIPtin-metFORMIN (JANUMET)  MG per tablet Take 1 tablet by mouth 2 (Two) Times a Day With Meals.   11/12/2021 at Unknown time   • carvedilol (Coreg) 3.125 MG tablet Take 1 tablet by mouth 2 (Two) Times a Day With Meals.      • enoxaparin (LOVENOX) 80 MG/0.8ML solution syringe Inject 0.8 mL under the skin into the appropriate area as directed Every 12 (Twelve) Hours. Indications: Other - full anticoagulation, acs      • multivitamin with minerals tablet tablet Take 1 tablet by mouth Daily.          Current Facility-Administered Medications:   •  aspirin EC tablet 81 mg, 81 mg, Oral, Daily, Jr Lizandro Schwab MD, 81 mg at 11/13/21 0901  •  atorvastatin (LIPITOR) tablet 20 mg, 20 mg, Oral, Nightly, Malinda Verdugo MD, 20 mg at 11/13/21 0000  •  carvedilol (COREG) tablet 3.125 mg, 3.125 mg, Oral, BID With Meals, Jr Lizandro Schwab MD, 3.125 mg at 11/13/21 0901  •  dextrose (D50W) (25 g/50 mL) IV injection 25 g, 25 g, Intravenous, Q15 Min PRN, Maria Luisa Lira APRCRISTIAN  •  dextrose (GLUTOSE) oral gel 15 g, 15 g, Oral, Q15 Min PRN, Maria Luisa Lira APRN  •  enoxaparin (LOVENOX) syringe 40 mg, 40 mg, Subcutaneous, Q24H, Malinda Verdugo MD, 40 mg at 11/13/21 0901  •  glipizide (GLUCOTROL) tablet 2.5 mg, 2.5 mg, Oral, QAM AC, Zaheer, Jr Lizandro LEONARD MD, 2.5 mg at 11/13/21 1059  •  glucagon (human recombinant) (GLUCAGEN DIAGNOSTIC) injection 1 mg, 1 mg, Subcutaneous, Q15 Min PRN, Maria Luisa Lira, CLAUDIA  •  insulin lispro (ADMELOG) injection 0-7 Units, 0-7 Units, Subcutaneous, TID  AC, Maria Luisa Lira L, APRN, 3 Units at 11/13/21 1143  •  lisinopril (PRINIVIL,ZESTRIL) tablet 2.5 mg, 2.5 mg, Oral, Q24H, Jr Lizandro Schwab MD, 2.5 mg at 11/13/21 1059  •  LORazepam (ATIVAN) tablet 1 mg, 1 mg, Oral, Q6H PRN, Malinda Verdugo MD  •  nitroglycerin (NITROSTAT) SL tablet 0.4 mg, 0.4 mg, Sublingual, Q5 Min PRN, Malinda Verdugo MD  •  ondansetron (ZOFRAN) injection 4 mg, 4 mg, Intravenous, Q6H PRN, Malinda Verdugo MD  •  sodium chloride 0.9 % flush 10 mL, 10 mL, Intravenous, Q12H, Malinda Verdugo MD, 10 mL at 11/13/21 0901  •  sodium chloride 0.9 % flush 10 mL, 10 mL, Intravenous, PRN, Malinda Verdugo MD  aspirin, 81 mg, Oral, Daily  atorvastatin, 20 mg, Oral, Nightly  carvedilol, 3.125 mg, Oral, BID With Meals  enoxaparin, 40 mg, Subcutaneous, Q24H  glipizide, 2.5 mg, Oral, QAM AC  insulin lispro, 0-7 Units, Subcutaneous, TID AC  lisinopril, 2.5 mg, Oral, Q24H  sodium chloride, 10 mL, Intravenous, Q12H      Allergies:  Patient has no known allergies.    Objective      Vital Signs  Temp:  [96.5 °F (35.8 °C)-98.8 °F (37.1 °C)] 97.9 °F (36.6 °C)  Heart Rate:  [58-65] 58  Resp:  [16] 16  BP: (119-125)/(57-63) 119/61    Flowsheet Rows      First Filed Value   Admission Height --   Admission Weight 81.4 kg (179 lb 8 oz) Documented at 11/13/2021 0630             Physical Exam  Vitals reviewed.   Constitutional:       General: He is awake. He is not in acute distress.     Appearance: Normal appearance. He is well-developed and normal weight. He is not ill-appearing, toxic-appearing or diaphoretic.      Interventions: He is not intubated.  HENT:      Head: Normocephalic and atraumatic.      Jaw: There is normal jaw occlusion.      Nose: Nose normal. No rhinorrhea.      Mouth/Throat:      Lips: No lesions.      Mouth: Mucous membranes are moist.      Dentition: Normal dentition. Does not have dentures. No dental tenderness.      Tongue: No lesions.      Palate: No mass.      Pharynx: Oropharynx  is clear. No posterior oropharyngeal erythema.   Eyes:      General: No scleral icterus.     Extraocular Movements: Extraocular movements intact.      Right eye: Normal extraocular motion and no nystagmus.      Left eye: Normal extraocular motion and no nystagmus.      Conjunctiva/sclera: Conjunctivae normal.      Right eye: Right conjunctiva is not injected.      Left eye: Left conjunctiva is not injected.      Pupils: Pupils are equal, round, and reactive to light.   Neck:      Thyroid: No thyroid mass, thyromegaly or thyroid tenderness.      Vascular: Normal carotid pulses. No carotid bruit, hepatojugular reflux or JVD.      Trachea: Trachea normal. No tracheostomy or tracheal deviation.   Cardiovascular:      Rate and Rhythm: Normal rate and regular rhythm.  No extrasystoles are present.     Chest Wall: PMI is not displaced. No thrill.      Pulses:           Radial pulses are 2+ on the right side and 2+ on the left side.        Femoral pulses are 2+ on the right side and 2+ on the left side.       Dorsalis pedis pulses are 1+ on the right side and 1+ on the left side.      Heart sounds: Murmur heard.    Systolic murmur is present with a grade of 2/6.  No friction rub. No gallop.    Pulmonary:      Effort: No tachypnea, bradypnea, accessory muscle usage, prolonged expiration, respiratory distress or retractions. He is not intubated.      Breath sounds: Normal breath sounds. No stridor, decreased air movement or transmitted upper airway sounds. No wheezing, rhonchi or rales.   Chest:      Chest wall: No mass, lacerations, deformity, swelling, tenderness, crepitus or edema. There is no dullness to percussion.   Breasts: Breasts are symmetrical.      Right: No supraclavicular adenopathy.      Left: No supraclavicular adenopathy.       Abdominal:      General: Abdomen is flat. Bowel sounds are normal. There is no distension.      Palpations: Abdomen is soft. There is no shifting dullness, hepatomegaly or  splenomegaly.      Tenderness: There is no abdominal tenderness. There is no right CVA tenderness or left CVA tenderness.   Musculoskeletal:         General: No swelling, tenderness, deformity or signs of injury. Normal range of motion.      Cervical back: Normal range of motion and neck supple. No edema, erythema, signs of trauma, rigidity, tenderness or crepitus. Normal range of motion.      Left lower leg: No edema.   Lymphadenopathy:      Cervical: No cervical adenopathy.      Right cervical: No superficial cervical adenopathy.     Left cervical: No superficial cervical adenopathy.      Upper Body:      Right upper body: No supraclavicular adenopathy.      Left upper body: No supraclavicular adenopathy.   Skin:     General: Skin is warm and dry.      Capillary Refill: Capillary refill takes 2 to 3 seconds.      Coloration: Skin is not jaundiced or pale.      Findings: No bruising, erythema, lesion or rash.   Neurological:      General: No focal deficit present.      Mental Status: He is alert, oriented to person, place, and time and easily aroused. Mental status is at baseline.      GCS: GCS eye subscore is 4. GCS verbal subscore is 5. GCS motor subscore is 6.      Cranial Nerves: Cranial nerves are intact. No cranial nerve deficit.      Sensory: Sensation is intact. No sensory deficit.      Motor: Motor function is intact. No weakness.      Coordination: Coordination is intact. Coordination normal.      Gait: Gait is intact. Gait normal.      Deep Tendon Reflexes: Reflexes normal.   Psychiatric:         Attention and Perception: Attention and perception normal.         Mood and Affect: Mood normal. Mood is not anxious.         Speech: Speech normal.         Behavior: Behavior normal. Behavior is cooperative.         Thought Content: Thought content normal. Thought content is not paranoid. Thought content does not include homicidal or suicidal ideation.         Cognition and Memory: Cognition and memory normal.  Cognition is not impaired. Memory is not impaired. He does not exhibit impaired recent memory or impaired remote memory.         Judgment: Judgment normal.       Results Review:       Assessment/Plan:     There is a pleasant 71-year-old  gentleman with a recent non-ST elevation myocardial infarction. Left heart catheterization revealed severe three-vessel coronary disease. He has been transferred here for possible surgical vascularization. Dr. Scwhab is planning to perform surgery early this coming week, possibly Monday or Tuesday.    Malinda Verdugo MD  11/13/21  13:12 EST

## 2021-11-13 NOTE — PROGRESS NOTES
Notified by nursing staff of patient's arrival. Admission orders entered. Tentative plan for coronary bypass surgery by Dr. Schwab this admission.    H&P to follow.

## 2021-11-13 NOTE — CONSULTS
Patient Name:  Thai Wu  YOB: 1950  MRN:  [id number]  Date of Admission:  11/12/2021  Date of Consult:  11/13/2021  Patient Care Team:  Jairo Curtis MD as PCP - General (Internal Medicine)    Inpatient Internal Medicine Consult  Consult performed by: Maria Luisa Lira APRN  Consult ordered by: Jr Lizandro Schwab MD  Reason for consult: diabetes management        Subjective   History of Present Illness  Mr. Wu is a 71 y.o. male that has been admitted to [facility]  elective cardiothoracic surgery by Dr. Schwab.  He was recently discharged from [facility] in [place] diagnosed with coronary artery disease with an ejection fraction of 55%.    He has been admitted to cardiovascular intervention floor by cardiothoracic surgery.  We have been asked to consult to assist with management of diabetes mellitus type 2.   Patient reports he has been a diabetic for approximately 35 years.  He denies any issues with polyneuropathy, retinopathy, or nephropathy.  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  He states blood sugars have been well controlled up until about a 6 months ago he started to notice a trend in one eighties to two twenties range despite activity diet and oral medications.  His last hemoglobin A1c was several months ago and he leaves was around 7.8.  Strong family history of diabetes both in his paternal uncle, brother, and father.  He states he does get symptomatic with hypoglycemia when his numbers are in the FSBS is in the upper 50's.  States hypoglycemic episodes are very rare for him at home.Currently denies any chest pain, shortness of breath, nausea, vomiting, or palpitations.     Past Medical History:   Diagnosis Date   • Hypertension    • Scleroderma (HCC)    • Type 2 diabetes mellitus (HCC)      Past Surgical History:   Procedure Laterality Date   • CARDIAC CATHETERIZATION Left 11/11/2021    Procedure: Left  Heart Cath;  Surgeon: Leroy Parker MD;  Location: [facility] CATH INVASIVE LOCATION;  Service: Cardiology;  Laterality: Left;   • HERNIA REPAIR       No family history on file.  Social History     Tobacco Use   • Smoking status: Never Smoker   • Smokeless tobacco: Former User   Substance Use Topics   • Alcohol use: Yes     Comment: socially   • Drug use: Never     Medications Prior to Admission   Medication Sig Dispense Refill Last Dose   • aspirin 81 MG chewable tablet Chew 81 mg Every Night.   11/12/2021 at Unknown time   • glimepiride (AMARYL) 2 MG tablet Take 2 mg by mouth Every Morning Before Breakfast.   11/12/2021 at Unknown time   • hydroxychloroquine (PLAQUENIL) 200 MG tablet Take 200 mg by mouth Every Night.   11/11/2021 at Unknown time   • lisinopril (PRINIVIL,ZESTRIL) 5 MG tablet Take 0.5 tablets by mouth Daily.   11/12/2021 at Unknown time   • simvastatin (ZOCOR) 20 MG tablet simvastatin 20 mg tablet   11/11/2021 at Unknown time   • sitaGLIPtin-metFORMIN (JANUMET)  MG per tablet Take 1 tablet by mouth 2 (Two) Times a Day With Meals.   11/12/2021 at Unknown time   • carvedilol (Coreg) 3.125 MG tablet Take 1 tablet by mouth 2 (Two) Times a Day With Meals.      • enoxaparin (LOVENOX) 80 MG/0.8ML solution syringe Inject 0.8 mL under the skin into the appropriate area as directed Every 12 (Twelve) Hours. Indications: Other - full anticoagulation, acs      • multivitamin with minerals tablet tablet Take 1 tablet by mouth Daily.        SR Incomplete RBBB      Allergies:  Patient has no known allergies.    Review of Systems   Constitutional: Negative for chills and fever.   HENT: Negative for sore throat and trouble swallowing.    Eyes: Negative for pain and visual disturbance.   Respiratory: Negative for cough and shortness of breath.    Cardiovascular: Negative for chest pain, palpitations and leg swelling.   Gastrointestinal: Negative for abdominal distention, abdominal pain, diarrhea, nausea and  vomiting.   Endocrine: Negative for polydipsia, polyphagia and polyuria.   Genitourinary: Negative for difficulty urinating and dysuria.   Musculoskeletal: Negative for joint swelling and myalgias.   Skin: Negative for rash and wound.   Neurological: Negative for dizziness and headaches.   Psychiatric/Behavioral: Negative for agitation and confusion.      Telemetry sinus rhythm and sinus tach with PVCs    Objective      Vital Signs  Temp:  [96.5 °F (35.8 °C)-98.8 °F (37.1 °C)] 97.9 °F (36.6 °C)  Heart Rate:  [58-65] 58  Resp:  [16] 16  BP: (119-125)/(57-63) 119/61  Body mass index is 25.05 kg/m².    Physical Exam  Vitals and nursing note reviewed.   Constitutional:       Appearance: Normal appearance. He is obese.   HENT:      Head: Normocephalic and atraumatic.      Mouth/Throat:      Mouth: Mucous membranes are moist.      Pharynx: Oropharynx is clear.   Eyes:      General: No scleral icterus.     Conjunctiva/sclera: Conjunctivae normal.   Neck:      Vascular: No carotid bruit.   Cardiovascular:      Rate and Rhythm: Normal rate and regular rhythm.      Pulses: Normal pulses.      Heart sounds: Normal heart sounds.   Pulmonary:      Effort: Pulmonary effort is normal. No respiratory distress.      Breath sounds: Normal breath sounds.   Abdominal:      General: Abdomen is flat. Bowel sounds are normal. There is no distension.      Palpations: Abdomen is soft.      Tenderness: There is no abdominal tenderness.   Musculoskeletal:         General: No swelling. Normal range of motion.      Cervical back: Neck supple. No tenderness.   Skin:     General: Skin is warm and dry.      Capillary Refill: Capillary refill takes less than 2 seconds.   Neurological:      General: No focal deficit present.      Mental Status: He is alert and oriented to person, place, and time. Mental status is at baseline.   Psychiatric:         Mood and Affect: Mood normal.         Behavior: Behavior normal.         Thought Content: Thought  content normal.         Judgment: Judgment normal.            Results Review:   I reviewed the patient's new clinical results.  I reviewed the patient's new imaging results and agree with the interpretation.  I reviewed the patient's other test results and agree with the interpretation         Assessment/Plan     Active Hospital Problems    Diagnosis  POA   • Essential hypertension [I10]  Unknown   • Type 2 diabetes mellitus with circulatory disorder (HCC) [E11.59]  Unknown   • Scleroderma (HCC) [M34.9]  Unknown   • CAD (coronary artery disease) [I25.10]  Yes      Resolved Hospital Problems   No resolved problems to display.       Mr. Wu is a 71 y.o. male with history of type 2 diabetes mellitus who is s/p cardiac cath 11/11/2021    · Pt takes amaryl and janumet as outpt.  He is currently on low-dose glipzide 2.5 mg and I will continue this.  Continue to hold janument post intracoronary contrast.  Would recommend stopping glipizide if he undergoes cardiothoracic and postop and cover with sliding scale until taking p.o. well postoperatively.    · Monitor glucose TID AC. For any BG less than 70 mg/dL, treat per hospital protocol.  · We will add low-dose sliding scale for better control /coverage.  He is insulin naïve and elderly so we will use low-dose.  · HgbA1C on 11/12/2021 was 8.11.  No previous A1c's noted in EMR.  BMP yesterday showed normal creatinine normal GFR.  BMP ordered for in the morning.  Will review.    · Cardiac and NCS diet.  · Most recent BP is 119/61 with a heart rate of 58.  Monitor.   · Monitor renal function post contrast.  ·   · DVT prophylaxis per surgery.  ·   He was encouraged to start using incentive spirometer prior to cardiothoracic surgery.  I instructed the patient on the use of incentive spirometer and placed at bedside.    Thank you very much for asking A to be involved in this patient's care. We will follow along with you.      CLAUDIA Aldana  [facility]  11/13/21  13:02 EST

## 2021-11-14 LAB
GLUCOSE BLDC GLUCOMTR-MCNC: 164 MG/DL (ref 70–130)
GLUCOSE BLDC GLUCOMTR-MCNC: 179 MG/DL (ref 70–130)
GLUCOSE BLDC GLUCOMTR-MCNC: 196 MG/DL (ref 70–130)
GLUCOSE BLDC GLUCOMTR-MCNC: 229 MG/DL (ref 70–130)
QT INTERVAL: 398 MS
QTC INTERVAL: 420 MS

## 2021-11-14 PROCEDURE — 99231 SBSQ HOSP IP/OBS SF/LOW 25: CPT | Performed by: NURSE PRACTITIONER

## 2021-11-14 PROCEDURE — 25010000002 ENOXAPARIN PER 10 MG: Performed by: THORACIC SURGERY (CARDIOTHORACIC VASCULAR SURGERY)

## 2021-11-14 PROCEDURE — 63710000001 INSULIN LISPRO (HUMAN) PER 5 UNITS: Performed by: NURSE PRACTITIONER

## 2021-11-14 PROCEDURE — 82962 GLUCOSE BLOOD TEST: CPT

## 2021-11-14 RX ORDER — GLIPIZIDE 5 MG/1
5 TABLET ORAL
Status: DISCONTINUED | OUTPATIENT
Start: 2021-11-15 | End: 2021-11-16

## 2021-11-14 RX ADMIN — SODIUM CHLORIDE, PRESERVATIVE FREE 10 ML: 5 INJECTION INTRAVENOUS at 08:25

## 2021-11-14 RX ADMIN — ATORVASTATIN CALCIUM 20 MG: 20 TABLET, FILM COATED ORAL at 20:56

## 2021-11-14 RX ADMIN — ENOXAPARIN SODIUM 40 MG: 40 INJECTION SUBCUTANEOUS at 08:25

## 2021-11-14 RX ADMIN — INSULIN LISPRO 2 UNITS: 100 INJECTION, SOLUTION INTRAVENOUS; SUBCUTANEOUS at 11:40

## 2021-11-14 RX ADMIN — CARVEDILOL 3.12 MG: 3.12 TABLET, FILM COATED ORAL at 08:25

## 2021-11-14 RX ADMIN — SODIUM CHLORIDE, PRESERVATIVE FREE 10 ML: 5 INJECTION INTRAVENOUS at 21:29

## 2021-11-14 RX ADMIN — CARVEDILOL 3.12 MG: 3.12 TABLET, FILM COATED ORAL at 17:01

## 2021-11-14 RX ADMIN — INSULIN LISPRO 3 UNITS: 100 INJECTION, SOLUTION INTRAVENOUS; SUBCUTANEOUS at 06:43

## 2021-11-14 RX ADMIN — ASPIRIN 81 MG: 81 TABLET, COATED ORAL at 08:25

## 2021-11-14 RX ADMIN — INSULIN LISPRO 2 UNITS: 100 INJECTION, SOLUTION INTRAVENOUS; SUBCUTANEOUS at 17:01

## 2021-11-14 RX ADMIN — GLIPIZIDE 2.5 MG: 5 TABLET ORAL at 06:43

## 2021-11-14 NOTE — PROGRESS NOTES
" LOS: 2 days   Patient Care Team:  Jairo Curtis MD as PCP - General (Internal Medicine)    Chief Complaint: preop open heart    Subjective:  Symptoms:  No shortness of breath or chest pain.    Diet:  No nausea.    Activity level: Normal.    Pain:  He reports no pain.          Vital Signs  Temp:  [97.5 °F (36.4 °C)-98 °F (36.7 °C)] 97.8 °F (36.6 °C)  Heart Rate:  [58-82] 58  Resp:  [16] 16  BP: (108-121)/(59-68) 121/68  Body mass index is 25.75 kg/m².    Intake/Output Summary (Last 24 hours) at 11/14/2021 1145  Last data filed at 11/14/2021 0836  Gross per 24 hour   Intake 650 ml   Output --   Net 650 ml     I/O this shift:  In: 170 [P.O.:170]  Out: -         11/13/21  0630 11/14/21  0335   Weight: 81.4 kg (179 lb 8 oz) 81.4 kg (179 lb 7 oz)         Objective:  General Appearance:  Comfortable.    Vital signs: (most recent): Blood pressure 121/68, pulse 58, temperature 97.8 °F (36.6 °C), temperature source Oral, resp. rate 16, height 177.8 cm (70\"), weight 81.4 kg (179 lb 7 oz), SpO2 97 %.    Lungs:  Normal effort and normal respiratory rate.    Heart: Normal rate.  Regular rhythm.    Extremities: There is no dependent edema.    Neurological: Patient is alert and oriented to person, place and time.    Skin:  Warm and dry.  No rash.             Results Review:        WBC WBC   Date Value Ref Range Status   11/12/2021 8.74 3.40 - 10.80 10*3/mm3 Final   11/12/2021 9.91 3.40 - 10.80 10*3/mm3 Final      HGB Hemoglobin   Date Value Ref Range Status   11/12/2021 15.7 13.0 - 17.7 g/dL Final   11/12/2021 15.0 13.0 - 17.7 g/dL Final      HCT Hematocrit   Date Value Ref Range Status   11/12/2021 44.6 37.5 - 51.0 % Final   11/12/2021 44.3 37.5 - 51.0 % Final      Platelets Platelets   Date Value Ref Range Status   11/12/2021 203 140 - 450 10*3/mm3 Final   11/12/2021 203 140 - 450 10*3/mm3 Final        PT/INR:    Protime   Date Value Ref Range Status   11/12/2021 13.0 11.7 - 14.2 Seconds Final   /  INR   Date Value " Ref Range Status   11/12/2021 1.00 0.90 - 1.10 Final       Sodium Sodium   Date Value Ref Range Status   11/12/2021 140 136 - 145 mmol/L Final   11/12/2021 137 136 - 145 mmol/L Final      Potassium Potassium   Date Value Ref Range Status   11/12/2021 4.2 3.5 - 5.2 mmol/L Final   11/12/2021 4.2 3.5 - 5.2 mmol/L Final      Chloride Chloride   Date Value Ref Range Status   11/12/2021 104 98 - 107 mmol/L Final   11/12/2021 104 98 - 107 mmol/L Final      Bicarbonate CO2   Date Value Ref Range Status   11/12/2021 26.4 22.0 - 29.0 mmol/L Final   11/12/2021 26.0 22.0 - 29.0 mmol/L Final      BUN BUN   Date Value Ref Range Status   11/12/2021 12 8 - 23 mg/dL Final   11/12/2021 14 8 - 23 mg/dL Final      Creatinine Creatinine   Date Value Ref Range Status   11/12/2021 0.93 0.76 - 1.27 mg/dL Final   11/12/2021 0.91 0.76 - 1.27 mg/dL Final      Calcium Calcium   Date Value Ref Range Status   11/12/2021 9.4 8.6 - 10.5 mg/dL Final   11/12/2021 8.9 8.6 - 10.5 mg/dL Final      Magnesium Magnesium   Date Value Ref Range Status   11/12/2021 2.3 1.6 - 2.4 mg/dL Final      Troponin No results found for: TROPONIN   BNP No results found for: BNP         aspirin, 81 mg, Oral, Daily  atorvastatin, 20 mg, Oral, Nightly  carvedilol, 3.125 mg, Oral, BID With Meals  enoxaparin, 40 mg, Subcutaneous, Q24H  glipizide, 2.5 mg, Oral, QAM AC  insulin lispro, 0-7 Units, Subcutaneous, TID AC  sodium chloride, 10 mL, Intravenous, Q12H           PRN Meds:.dextrose  •  dextrose  •  glucagon (human recombinant)  •  LORazepam  •  nitroglycerin  •  ondansetron  •  sodium chloride    Patient Active Problem List   Diagnosis Code   • Chest pain R07.9   • Non Q wave myocardial infarction (HCC) I21.4   • Coronary artery disease of native heart with stable angina pectoris (HCC) I25.118   • CAD (coronary artery disease) I25.10   • Essential hypertension I10   • Type 2 diabetes mellitus with circulatory disorder (HCC) E11.59   • Scleroderma (HCC) M34.9        Assessment & Plan    NSTEMI, plan for CABG on Tuesday. HbA1C 8.1, Internal medicine consulted.  Dr. Schwab to review vein mapping.      U/a ok  COVID negative  cxr ok  Carotids: normal  Vein mapping: inadequate GSV and marginal right thigh      Radha Phan, APRN  11/14/21  11:45 EST

## 2021-11-14 NOTE — PROGRESS NOTES
Name: Thai Wu ADMIT: 2021   : 1950  PCP: Jairo Curtis MD    MRN: [id number] LOS: 2 days   AGE/SEX: 71 y.o. male  ROOM:      Subjective   Subjective   Did have brief episode of Upper back pain this morning that was positional while lying in bed and last a few minutes but improved with getting up out of bed. No N/V, palpitations, or Chest pain associated with it.  He states he has been feeling well otherwise.  Got word from surgery team that he scheduled Tuesday for CAB surgery.  Is been using incentive spirometer pulling 2000 to 2500 mL  Daughter and Wife at bedside. Had BM today.     Review of Systems   Constitutional: Negative for appetite change, chills and fever.   Respiratory: Negative for cough, chest tightness and shortness of breath.    Cardiovascular: Negative for chest pain, palpitations and leg swelling.   Gastrointestinal: Negative for abdominal distention, abdominal pain, constipation, nausea and vomiting.   Musculoskeletal: Positive for back pain. Negative for gait problem and joint swelling.         Objective   Objective   Vital Signs  Temp:  [97.5 °F (36.4 °C)-97.8 °F (36.6 °C)] 97.8 °F (36.6 °C)  Heart Rate:  [58-82] 58  Resp:  [16] 16  BP: (108-121)/(59-68) 121/68     on   ;   Device (Oxygen Therapy): room air  Body mass index is 25.75 kg/m².    Physical Exam  Vitals and nursing note reviewed.   Constitutional:       General: He is not in acute distress.     Appearance: Normal appearance. He is not ill-appearing.   Cardiovascular:      Rate and Rhythm: Normal rate and regular rhythm.      Pulses: Normal pulses.      Heart sounds: Normal heart sounds. No murmur heard.      Pulmonary:      Effort: Pulmonary effort is normal.      Breath sounds: Normal breath sounds.   Abdominal:      General: Abdomen is flat. Bowel sounds are normal.      Palpations: Abdomen is soft.   Skin:     General: Skin is warm and dry.      Capillary Refill: Capillary refill  takes less than 2 seconds.   Neurological:      General: No focal deficit present.      Mental Status: He is alert and oriented to person, place, and time. Mental status is at baseline.   Psychiatric:         Mood and Affect: Mood normal.         Behavior: Behavior normal.         Thought Content: Thought content normal.         Judgment: Judgment normal.              Results Review  I reviewed the patient's new clinical results.  Results from last 7 days   Lab Units 11/12/21  2119 11/12/21  0641 11/11/21  0626 11/10/21  0613   WBC 10*3/mm3 8.74 9.91 8.00 8.19   HEMOGLOBIN g/dL 15.7 15.0 14.3 16.0   PLATELETS 10*3/mm3 203 203 182 208     Results from last 7 days   Lab Units 11/12/21  2119 11/12/21  0641 11/11/21  0626 11/10/21  0613   SODIUM mmol/L 140 137 135* 141   POTASSIUM mmol/L 4.2 4.2 4.4 4.5   CHLORIDE mmol/L 104 104 101 102   CO2 mmol/L 26.4 26.0 27.0 28.0   BUN mg/dL 12 14 16 19   CREATININE mg/dL 0.93 0.91 1.06 0.99   GLUCOSE mg/dL 168* 202* 218* 203*     Lab Results   Component Value Date    ANIONGAP 9.6 11/12/2021     Estimated Creatinine Clearance: 81.4 mL/min (by C-G formula based on SCr of 0.93 mg/dL).    Results from last 7 days   Lab Units 11/12/21  2119 11/11/21  0626 11/10/21  0613   ALBUMIN g/dL 4.80 4.10 4.60   BILIRUBIN mg/dL 0.4 0.4 0.3   ALK PHOS U/L 49 42 53   AST (SGOT) U/L 18 15 16   ALT (SGPT) U/L 21 15 17     Results from last 7 days   Lab Units 11/12/21  2119 11/12/21  0641 11/11/21  0626 11/10/21  0613   CALCIUM mg/dL 9.4 8.9 8.8 9.7   ALBUMIN g/dL 4.80  --  4.10 4.60   MAGNESIUM mg/dL 2.3  --   --   --        Hemoglobin A1C   Date/Time Value Ref Range Status   11/12/2021 2119 8.11 (H) 4.80 - 5.60 % Final     Glucose   Date/Time Value Ref Range Status   11/14/2021 1115 164 (H) 70 - 130 mg/dL Final     Comment:     Meter: [id number] : [id number] Princess HARRISON   11/14/2021 0538 229 (H) 70 - 130 mg/dL Final     Comment:     Meter: [id number] : [id number] Shauna HARRISON    11/13/2021 2040 172 (H) 70 - 130 mg/dL Final     Comment:     Meter: [id number] : [id number] Iglesia Brown NA   11/13/2021 1635 104 70 - 130 mg/dL Final     Comment:     Meter: [id number] : [id number] Princess Nielsen NA   11/13/2021 1107 214 (H) 70 - 130 mg/dL Final     Comment:     Meter: [id number] : [id number] Princess Nielsen NA   11/13/2021 0553 184 (H) 70 - 130 mg/dL Final     Comment:     Meter: [id number] : [id number] Iglesia Brown NA   11/12/2021 1637 143 (H) 70 - 130 mg/dL Final     Comment:     Result Not ConfirmedOperator: [id number] CHAI NOGUERAMeter ID: [id number]         Current Facility-Administered Medications:   •  aspirin EC tablet 81 mg, 81 mg, Oral, Daily, Jr Lizandro Schwab MD, 81 mg at 11/14/21 0825  •  atorvastatin (LIPITOR) tablet 20 mg, 20 mg, Oral, Nightly, Malinda Verdugo MD, 20 mg at 11/13/21 2018  •  carvedilol (COREG) tablet 3.125 mg, 3.125 mg, Oral, BID With Meals, Jr Lizandro Schwab MD, 3.125 mg at 11/14/21 0825  •  dextrose (D50W) (25 g/50 mL) IV injection 25 g, 25 g, Intravenous, Q15 Min PRN, Maria Luisa Lira APRN  •  dextrose (GLUTOSE) oral gel 15 g, 15 g, Oral, Q15 Min PRN, Maria Luisa Lira APRCRISTIAN  •  enoxaparin (LOVENOX) syringe 40 mg, 40 mg, Subcutaneous, Q24H, Malinda Verdugo MD, 40 mg at 11/14/21 0825  •  glipizide (GLUCOTROL) tablet 2.5 mg, 2.5 mg, Oral, QAM AC, Jr Lizandro Schwab MD, 2.5 mg at 11/14/21 0643  •  glucagon (human recombinant) (GLUCAGEN DIAGNOSTIC) injection 1 mg, 1 mg, Subcutaneous, Q15 Min PRN, Maria Luisa Lira APRN  •  insulin lispro (ADMELOG) injection 0-7 Units, 0-7 Units, Subcutaneous, TID AC, Maria Luisa Lira APRN, 2 Units at 11/14/21 1140  •  LORazepam (ATIVAN) tablet 1 mg, 1 mg, Oral, Q6H PRN, Malinda Verdugo MD  •  nitroglycerin (NITROSTAT) SL tablet 0.4 mg, 0.4 mg, Sublingual, Q5 Min PRN, Malinda Verdugo MD  •  ondansetron (ZOFRAN) injection 4 mg, 4 mg, Intravenous, Q6H PRN, Malinda Verdugo  MD Brennen  •  sodium chloride 0.9 % flush 10 mL, 10 mL, Intravenous, Q12H, Malinda Verdugo MD, 10 mL at 11/14/21 0825  •  sodium chloride 0.9 % flush 10 mL, 10 mL, Intravenous, PRN, Malinda Verdugo MD       Diet  Diet Regular; Cardiac, Consistent Carbohydrate     Assessment/Plan     Active Hospital Problems    Diagnosis  POA   • Essential hypertension [I10]  Unknown   • Type 2 diabetes mellitus with circulatory disorder (HCC) [E11.59]  Unknown   • Scleroderma (HCC) [M34.9]  Unknown   • CAD (coronary artery disease) [I25.10]  Yes      Resolved Hospital Problems   No resolved problems to display.     71 y.o. male admitted with history of type 2 diabetes mellitus who is s/p cardiac cath 11/11/2021 found multivessel disease.  There are plans to go for CAB surgery this Tuesday 11/16.      · Pt takes amaryl and janumet as outpt.  He is currently on low-dose glipzide 2.5 mg each am.  Increase to 5 mg.    Continue to hold janument post intracoronary contrast. Will stop day of surgery and use SS insulin only post op until taking diet well.   · Monitor glucose TID AC. For any BG less than 70 mg/dL, treat per hospital protocol.  · Continue low-dose sliding scale for better control /coverage.  He is insulin naïve and elderly so we will use low-dose.   · Check BMP in am  · HgbA1C on 11/12/2021 was 8.11.  No previous A1c's noted in EMR.  BMP yesterday showed normal creatinine normal GFR.    Will review.    · Cardiac and NCS diet.       · DVT prophylaxis per surgery.  · Discussed with pt, wife, and daughter.     Maria Luisa Lira APRCRISTIAN  [facility]  11/14/21  15:18 EST    I wore protective equipment throughout this patient encounter including a face mask, gloves, and protective eyewear.  Hand hygiene was performed before donning protective equipment and after removal when leaving the room.

## 2021-11-15 ENCOUNTER — ANESTHESIA EVENT (OUTPATIENT)
Dept: PERIOP | Facility: HOSPITAL | Age: 71
End: 2021-11-15

## 2021-11-15 ENCOUNTER — APPOINTMENT (OUTPATIENT)
Dept: GENERAL RADIOLOGY | Facility: HOSPITAL | Age: 71
End: 2021-11-15

## 2021-11-15 LAB
ABO GROUP BLD: NORMAL
ALBUMIN SERPL-MCNC: 4 G/DL (ref 3.5–5.2)
ALBUMIN/GLOB SERPL: 1.7 G/DL
ALP SERPL-CCNC: 53 U/L (ref 39–117)
ALT SERPL W P-5'-P-CCNC: 47 U/L (ref 1–41)
ANION GAP SERPL CALCULATED.3IONS-SCNC: 8.6 MMOL/L (ref 5–15)
AST SERPL-CCNC: 29 U/L (ref 1–40)
BASOPHILS # BLD AUTO: 0.05 10*3/MM3 (ref 0–0.2)
BASOPHILS NFR BLD AUTO: 0.7 % (ref 0–1.5)
BILIRUB SERPL-MCNC: 0.3 MG/DL (ref 0–1.2)
BLD GP AB SCN SERPL QL: NEGATIVE
BUN SERPL-MCNC: 18 MG/DL (ref 8–23)
BUN/CREAT SERPL: 18.4 (ref 7–25)
CALCIUM SPEC-SCNC: 9 MG/DL (ref 8.6–10.5)
CHLORIDE SERPL-SCNC: 102 MMOL/L (ref 98–107)
CO2 SERPL-SCNC: 29.4 MMOL/L (ref 22–29)
CREAT SERPL-MCNC: 0.98 MG/DL (ref 0.76–1.27)
DEPRECATED RDW RBC AUTO: 42 FL (ref 37–54)
EOSINOPHIL # BLD AUTO: 0.59 10*3/MM3 (ref 0–0.4)
EOSINOPHIL NFR BLD AUTO: 8 % (ref 0.3–6.2)
ERYTHROCYTE [DISTWIDTH] IN BLOOD BY AUTOMATED COUNT: 12.4 % (ref 12.3–15.4)
GFR SERPL CREATININE-BSD FRML MDRD: 75 ML/MIN/1.73
GLOBULIN UR ELPH-MCNC: 2.4 GM/DL
GLUCOSE BLDC GLUCOMTR-MCNC: 150 MG/DL (ref 70–130)
GLUCOSE BLDC GLUCOMTR-MCNC: 197 MG/DL (ref 70–130)
GLUCOSE BLDC GLUCOMTR-MCNC: 211 MG/DL (ref 70–130)
GLUCOSE BLDC GLUCOMTR-MCNC: 229 MG/DL (ref 70–130)
GLUCOSE SERPL-MCNC: 209 MG/DL (ref 65–99)
HCT VFR BLD AUTO: 45.2 % (ref 37.5–51)
HGB BLD-MCNC: 15.6 G/DL (ref 13–17.7)
IMM GRANULOCYTES # BLD AUTO: 0.02 10*3/MM3 (ref 0–0.05)
IMM GRANULOCYTES NFR BLD AUTO: 0.3 % (ref 0–0.5)
LYMPHOCYTES # BLD AUTO: 1.34 10*3/MM3 (ref 0.7–3.1)
LYMPHOCYTES NFR BLD AUTO: 18.1 % (ref 19.6–45.3)
MCH RBC QN AUTO: 32 PG (ref 26.6–33)
MCHC RBC AUTO-ENTMCNC: 34.5 G/DL (ref 31.5–35.7)
MCV RBC AUTO: 92.8 FL (ref 79–97)
MONOCYTES # BLD AUTO: 0.87 10*3/MM3 (ref 0.1–0.9)
MONOCYTES NFR BLD AUTO: 11.7 % (ref 5–12)
NEUTROPHILS NFR BLD AUTO: 4.55 10*3/MM3 (ref 1.7–7)
NEUTROPHILS NFR BLD AUTO: 61.2 % (ref 42.7–76)
NRBC BLD AUTO-RTO: 0 /100 WBC (ref 0–0.2)
PLATELET # BLD AUTO: 206 10*3/MM3 (ref 140–450)
PMV BLD AUTO: 9.5 FL (ref 6–12)
POTASSIUM SERPL-SCNC: 4.3 MMOL/L (ref 3.5–5.2)
PROT SERPL-MCNC: 6.4 G/DL (ref 6–8.5)
QT INTERVAL: 408 MS
QTC INTERVAL: 417 MS
RBC # BLD AUTO: 4.87 10*6/MM3 (ref 4.14–5.8)
RH BLD: POSITIVE
SARS-COV-2 ORF1AB RESP QL NAA+PROBE: NOT DETECTED
SODIUM SERPL-SCNC: 140 MMOL/L (ref 136–145)
T&S EXPIRATION DATE: NORMAL
WBC # BLD AUTO: 7.42 10*3/MM3 (ref 3.4–10.8)

## 2021-11-15 PROCEDURE — 86901 BLOOD TYPING SEROLOGIC RH(D): CPT

## 2021-11-15 PROCEDURE — 86900 BLOOD TYPING SEROLOGIC ABO: CPT

## 2021-11-15 PROCEDURE — 86850 RBC ANTIBODY SCREEN: CPT | Performed by: NURSE PRACTITIONER

## 2021-11-15 PROCEDURE — 80053 COMPREHEN METABOLIC PANEL: CPT | Performed by: NURSE PRACTITIONER

## 2021-11-15 PROCEDURE — 71046 X-RAY EXAM CHEST 2 VIEWS: CPT

## 2021-11-15 PROCEDURE — 63710000001 INSULIN LISPRO (HUMAN) PER 5 UNITS: Performed by: NURSE PRACTITIONER

## 2021-11-15 PROCEDURE — 86901 BLOOD TYPING SEROLOGIC RH(D): CPT | Performed by: NURSE PRACTITIONER

## 2021-11-15 PROCEDURE — 86900 BLOOD TYPING SEROLOGIC ABO: CPT | Performed by: NURSE PRACTITIONER

## 2021-11-15 PROCEDURE — 82962 GLUCOSE BLOOD TEST: CPT

## 2021-11-15 PROCEDURE — U0005 INFEC AGEN DETEC AMPLI PROBE: HCPCS | Performed by: THORACIC SURGERY (CARDIOTHORACIC VASCULAR SURGERY)

## 2021-11-15 PROCEDURE — U0004 COV-19 TEST NON-CDC HGH THRU: HCPCS | Performed by: THORACIC SURGERY (CARDIOTHORACIC VASCULAR SURGERY)

## 2021-11-15 PROCEDURE — 85025 COMPLETE CBC W/AUTO DIFF WBC: CPT | Performed by: NURSE PRACTITIONER

## 2021-11-15 PROCEDURE — 25010000002 ENOXAPARIN PER 10 MG: Performed by: THORACIC SURGERY (CARDIOTHORACIC VASCULAR SURGERY)

## 2021-11-15 PROCEDURE — 86923 COMPATIBILITY TEST ELECTRIC: CPT

## 2021-11-15 RX ORDER — SODIUM CHLORIDE 0.9 % (FLUSH) 0.9 %
3-10 SYRINGE (ML) INJECTION AS NEEDED
Status: CANCELLED | OUTPATIENT
Start: 2021-11-15

## 2021-11-15 RX ORDER — CEFAZOLIN SODIUM 2 G/100ML
2 INJECTION, SOLUTION INTRAVENOUS
Status: COMPLETED | OUTPATIENT
Start: 2021-11-16 | End: 2021-11-16

## 2021-11-15 RX ORDER — SODIUM CHLORIDE 0.9 % (FLUSH) 0.9 %
10 SYRINGE (ML) INJECTION EVERY 12 HOURS SCHEDULED
Status: CANCELLED | OUTPATIENT
Start: 2021-11-15

## 2021-11-15 RX ORDER — FENTANYL CITRATE 50 UG/ML
50 INJECTION, SOLUTION INTRAMUSCULAR; INTRAVENOUS
Status: CANCELLED | OUTPATIENT
Start: 2021-11-15

## 2021-11-15 RX ORDER — LIDOCAINE HYDROCHLORIDE 10 MG/ML
0.5 INJECTION, SOLUTION EPIDURAL; INFILTRATION; INTRACAUDAL; PERINEURAL ONCE AS NEEDED
Status: CANCELLED | OUTPATIENT
Start: 2021-11-15

## 2021-11-15 RX ORDER — SODIUM CHLORIDE, SODIUM LACTATE, POTASSIUM CHLORIDE, CALCIUM CHLORIDE 600; 310; 30; 20 MG/100ML; MG/100ML; MG/100ML; MG/100ML
9 INJECTION, SOLUTION INTRAVENOUS CONTINUOUS
Status: CANCELLED | OUTPATIENT
Start: 2021-11-15

## 2021-11-15 RX ORDER — FAMOTIDINE 10 MG/ML
20 INJECTION, SOLUTION INTRAVENOUS ONCE
Status: CANCELLED | OUTPATIENT
Start: 2021-11-15 | End: 2021-11-15

## 2021-11-15 RX ORDER — NITROGLYCERIN 0.4 MG/1
0.4 TABLET SUBLINGUAL
Status: DISCONTINUED | OUTPATIENT
Start: 2021-11-15 | End: 2021-11-16

## 2021-11-15 RX ORDER — ALPRAZOLAM 0.25 MG/1
0.25 TABLET ORAL EVERY 8 HOURS PRN
Status: DISCONTINUED | OUTPATIENT
Start: 2021-11-15 | End: 2021-11-16

## 2021-11-15 RX ORDER — CHLORHEXIDINE GLUCONATE 0.12 MG/ML
15 RINSE ORAL EVERY 12 HOURS SCHEDULED
Status: COMPLETED | OUTPATIENT
Start: 2021-11-15 | End: 2021-11-16

## 2021-11-15 RX ORDER — SODIUM CHLORIDE 0.9 % (FLUSH) 0.9 %
10 SYRINGE (ML) INJECTION AS NEEDED
Status: CANCELLED | OUTPATIENT
Start: 2021-11-15

## 2021-11-15 RX ORDER — SODIUM CHLORIDE 0.9 % (FLUSH) 0.9 %
3 SYRINGE (ML) INJECTION EVERY 12 HOURS SCHEDULED
Status: CANCELLED | OUTPATIENT
Start: 2021-11-15

## 2021-11-15 RX ORDER — CHLORHEXIDINE GLUCONATE 500 MG/1
1 CLOTH TOPICAL EVERY 12 HOURS
Status: COMPLETED | OUTPATIENT
Start: 2021-11-15 | End: 2021-11-16

## 2021-11-15 RX ORDER — METHADONE HYDROCHLORIDE 10 MG/1
10 TABLET ORAL ONCE
Status: COMPLETED | OUTPATIENT
Start: 2021-11-16 | End: 2021-11-16

## 2021-11-15 RX ORDER — TEMAZEPAM 7.5 MG/1
7.5 CAPSULE ORAL NIGHTLY PRN
Status: DISCONTINUED | OUTPATIENT
Start: 2021-11-15 | End: 2021-11-16

## 2021-11-15 RX ORDER — MIDAZOLAM HYDROCHLORIDE 1 MG/ML
0.5 INJECTION INTRAMUSCULAR; INTRAVENOUS
Status: CANCELLED | OUTPATIENT
Start: 2021-11-15

## 2021-11-15 RX ADMIN — ATORVASTATIN CALCIUM 20 MG: 20 TABLET, FILM COATED ORAL at 20:43

## 2021-11-15 RX ADMIN — SODIUM CHLORIDE, PRESERVATIVE FREE 10 ML: 5 INJECTION INTRAVENOUS at 08:36

## 2021-11-15 RX ADMIN — ASPIRIN 81 MG: 81 TABLET, COATED ORAL at 08:36

## 2021-11-15 RX ADMIN — SODIUM CHLORIDE, PRESERVATIVE FREE 10 ML: 5 INJECTION INTRAVENOUS at 20:46

## 2021-11-15 RX ADMIN — INSULIN LISPRO 3 UNITS: 100 INJECTION, SOLUTION INTRAVENOUS; SUBCUTANEOUS at 06:51

## 2021-11-15 RX ADMIN — CARVEDILOL 3.12 MG: 3.12 TABLET, FILM COATED ORAL at 17:20

## 2021-11-15 RX ADMIN — CHLORHEXIDINE GLUCONATE 15 ML: 1.2 RINSE ORAL at 20:43

## 2021-11-15 RX ADMIN — CHLORHEXIDINE GLUCONATE 1 APPLICATION: 500 CLOTH TOPICAL at 20:44

## 2021-11-15 RX ADMIN — CARVEDILOL 3.12 MG: 3.12 TABLET, FILM COATED ORAL at 08:36

## 2021-11-15 RX ADMIN — ENOXAPARIN SODIUM 40 MG: 40 INJECTION SUBCUTANEOUS at 08:36

## 2021-11-15 RX ADMIN — INSULIN LISPRO 2 UNITS: 100 INJECTION, SOLUTION INTRAVENOUS; SUBCUTANEOUS at 12:01

## 2021-11-15 RX ADMIN — MUPIROCIN 1 APPLICATION: 20 OINTMENT TOPICAL at 20:43

## 2021-11-15 RX ADMIN — GLIPIZIDE 5 MG: 5 TABLET ORAL at 06:51

## 2021-11-15 NOTE — ANESTHESIA PREPROCEDURE EVALUATION
" Anesthesia Evaluation     Patient summary reviewed and Nursing notes reviewed   no history of anesthetic complications:               Airway   Mallampati: II  TM distance: >3 FB  Neck ROM: full  no difficulty expected  Dental - normal exam     Pulmonary - negative pulmonary ROS    breath sounds clear to auscultation  (-) shortness of breath, sleep apnea, decreased breath sounds, wheezes  Cardiovascular - normal exam  Exercise tolerance: poor (<4 METS)    PT is on anticoagulation therapy  Patient on routine beta blocker  Rhythm: regular  Rate: normal    (+) hypertension, valvular problems/murmurs MR and TI, past MI  1-7 days, CAD, angina at rest,   (-) CHF, orthopnea, PND, JONES, PVD      Neuro/Psych- negative ROS  (-) seizures, neuromuscular disease, TIA, CVA, dizziness/light headedness, weakness, numbness  GI/Hepatic/Renal/Endo    (+)   diabetes mellitus type 2 poorly controlled,   (-) liver disease    Musculoskeletal (-) negative ROS    Abdominal  - normal exam   Substance History - negative use  (-) alcohol use, drug use     OB/GYN negative ob/gyn ROS         Other   autoimmune disease scleroderma,                      Anesthesia Plan    ASA 4     general   (A-line  CVC  PA Catheter  RICKI    I discussed with the patient the relevant risks of general anesthesia including, but not limited to, nausea, vomiting, disorientation, post-op delirium, nerve injury, oral/dental injury, awareness, stroke, and death.  I also reviewed any clinically relevant lab and imaging results.    /75 (BP Location: Left arm, Patient Position: Sitting)   Pulse 82   Temp 36.5 °C (97.7 °F) (Oral)   Resp 16   Ht 177.8 cm (70\")   Wt 80 kg (176 lb 6.4 oz)   SpO2 97%   BMI 25.31 kg/m²   Coronary artery disease involving native coronary artery of native heart without angina pectoris  (primary encounter diagnosis)  Plan: Case request, Case request    )  intravenous induction   Postoperative Plan: Expected vent after surgery  Anesthetic " plan, all risks, benefits, and alternatives have been provided, discussed and informed consent has been obtained with: patient.  Use of blood products discussed with patient  Consented to blood products.

## 2021-11-15 NOTE — PROGRESS NOTES
He has multivessel coronary disease involving the LAD and circumflex.  He has multiple varicose veins of the lower extremity.  We may be able to do his surgery with 2 JAMES's.  I discussed this with the patient.

## 2021-11-15 NOTE — PROGRESS NOTES
Name: Thai Wu ADMIT: 2021   : 1950  PCP: Jairo Curtis MD    MRN: [id number] LOS: 3 days   AGE/SEX: 71 y.o. male  ROOM:      Subjective   Subjective   Voices no complaints.  His glucoses are elevated from his normal baseline but he is not on all of his usual home meds yet    Review of Systems     Objective   Objective   Vital Signs  Temp:  [97.9 °F (36.6 °C)-98.6 °F (37 °C)] 97.9 °F (36.6 °C)  Heart Rate:  [58-91] 91  Resp:  [16] 16  BP: (102-122)/(54-77) 114/77     on   ;   Device (Oxygen Therapy): room air  Body mass index is 25.31 kg/m².  Physical Exam  Vitals and nursing note reviewed.   Constitutional:       General: He is not in acute distress.     Appearance: He is well-developed.   HENT:      Head: Normocephalic and atraumatic.   Eyes:      General: No scleral icterus.  Cardiovascular:      Rate and Rhythm: Normal rate and regular rhythm.      Heart sounds: No murmur heard.      Pulmonary:      Effort: Pulmonary effort is normal. No respiratory distress.   Musculoskeletal:      Right lower leg: No edema.      Left lower leg: No edema.   Skin:     General: Skin is warm and dry.      Findings: No rash.   Neurological:      Mental Status: He is alert and oriented to person, place, and time.         Results Review     I reviewed the patient's new clinical results.  Results from last 7 days   Lab Units 11/15/21  1004 21  06   WBC 10*3/mm3 7.42 8.74 9.91 8.00   HEMOGLOBIN g/dL 15.6 15.7 15.0 14.3   PLATELETS 10*3/mm3 206 203 203 182     Results from last 7 days   Lab Units 11/15/21  1004 2141 21  0626   SODIUM mmol/L 140 140 137 135*   POTASSIUM mmol/L 4.3 4.2 4.2 4.4   CHLORIDE mmol/L 102 104 104 101   CO2 mmol/L 29.4* 26.4 26.0 27.0   BUN mg/dL 18 12 14 16   CREATININE mg/dL 0.98 0.93 0.91 1.06   GLUCOSE mg/dL 209* 168* 202* 218*   EGFR IF NONAFRICN AM mL/min/1.73 75 80 82 68     Results from last  7 days   Lab Units 11/15/21  1004 11/12/21 2119 11/11/21  0626 11/10/21  0613   ALBUMIN g/dL 4.00 4.80 4.10 4.60   BILIRUBIN mg/dL 0.3 0.4 0.4 0.3   ALK PHOS U/L 53 49 42 53   AST (SGOT) U/L 29 18 15 16   ALT (SGPT) U/L 47* 21 15 17     Results from last 7 days   Lab Units 11/15/21  1004 11/12/21 2119 11/12/21  0641 11/11/21  0626 11/10/21  0613 11/10/21  0613   CALCIUM mg/dL 9.0 9.4 8.9 8.8   < > 9.7   ALBUMIN g/dL 4.00 4.80  --  4.10  --  4.60   MAGNESIUM mg/dL  --  2.3  --   --   --   --     < > = values in this interval not displayed.       COVID19   Date Value Ref Range Status   11/15/2021 Not Detected Not Detected - Ref. Range Final   11/13/2021 Not Detected Not Detected - Ref. Range Final     Hemoglobin A1C   Date/Time Value Ref Range Status   11/12/2021 2119 8.11 (H) 4.80 - 5.60 % Final     Glucose   Date/Time Value Ref Range Status   11/15/2021 1540 150 (H) 70 - 130 mg/dL Final     Comment:     Meter: [id number] : [id number] Mone Zelaya RUBEN   11/15/2021 1044 197 (H) 70 - 130 mg/dL Final     Comment:     Meter: [id number] : [id number] Hussein Betancourt NST   11/15/2021 0535 229 (H) 70 - 130 mg/dL Final     Comment:     Meter: [id number] : [id number] Shauna Fuentesa NA   11/14/2021 2013 196 (H) 70 - 130 mg/dL Final     Comment:     Meter: [id number] : [id number] Shauna Hermannica NA   11/14/2021 1626 179 (H) 70 - 130 mg/dL Final     Comment:     Meter: [id number] : [id number] Princess Nielsen NA   11/14/2021 1115 164 (H) 70 - 130 mg/dL Final     Comment:     Meter: [id number] : [id number] Princess Nielsen HUNTER   11/14/2021 0538 229 (H) 70 - 130 mg/dL Final     Comment:     Meter: [id number] : [id number] Shauna HARRISON       XR Chest PA & Lateral  Narrative: XR CHEST PA AND LATERAL-     HISTORY: Male who is 71 years-old,  preoperative evaluation     TECHNIQUE: Frontal and lateral views of the chest     COMPARISON: 11/13/2021     FINDINGS: Heart size is normal. Aorta is tortuous,  calcified. Pulmonary  vasculature is unremarkable. No focal pulmonary consolidation, pleural  effusion, or pneumothorax. No acute osseous process.     Impression: No focal pulmonary consolidation. Tortuous aorta. Follow-up  as clinically indicated.     This report was finalized on 11/15/2021 2:20 PM by Dr. Tato Richardson M.D.       Scheduled Medications  aspirin, 81 mg, Oral, Daily  atorvastatin, 20 mg, Oral, Nightly  carvedilol, 3.125 mg, Oral, BID With Meals  [START ON 11/16/2021] ceFAZolin, 2 g, Intravenous, On Call to OR  chlorhexidine, 15 mL, Mouth/Throat, Q12H  Chlorhexidine Gluconate Cloth, 1 application, Topical, Q12H  glipizide, 5 mg, Oral, QAM AC  insulin lispro, 0-7 Units, Subcutaneous, TID AC  [START ON 11/16/2021] metoprolol tartrate, 12.5 mg, Oral, On Call to OR  mupirocin, 1 application, Each Nare, Q12H  sodium chloride, 10 mL, Intravenous, Q12H    Infusions   Diet  Diet Regular; Cardiac, Consistent Carbohydrate  NPO Diet NPO Except: Sips with meds       Assessment/Plan     Active Hospital Problems    Diagnosis  POA   • Essential hypertension [I10]  Unknown   • Type 2 diabetes mellitus with circulatory disorder (HCC) [E11.59]  Unknown   • Scleroderma (HCC) [M34.9]  Unknown   • CAD (coronary artery disease) [I25.10]  Yes      Resolved Hospital Problems   No resolved problems to display.       71 y.o. male with history of diabetes and hypertension, transferred here with multivessel CAD and plans for CABG.    · DM2: Uncontrolled at times.  Consider restarting Januvia postop.  Follow routine postop protocols after CABG tomorrow  · Continue aspirin/Coreg/Zocor  · Monitor perioperative lab  · Discussed with patient and family at bedside  · SCDs.  Lovenox DC'd in preparation for surgery        Awais Lamar MD  [facility]  11/15/21  15:54 EST

## 2021-11-15 NOTE — PROGRESS NOTES
LOS: 3 days   Patient Care Team:  Jairo Curtis MD as PCP - General (Internal Medicine)    Chief Complaint: post op    Subjective      Vital Signs  Temp:  [97.9 °F (36.6 °C)-98.6 °F (37 °C)] 98.1 °F (36.7 °C)  Heart Rate:  [58-71] 68  Resp:  [16] 16  BP: (102-122)/(54-82) 122/75  Body mass index is 25.31 kg/m².    Intake/Output Summary (Last 24 hours) at 11/15/2021 0854  Last data filed at 11/15/2021 0805  Gross per 24 hour   Intake 720 ml   Output --   Net 720 ml     I/O this shift:  In: 240 [P.O.:240]  Out: -     Chest tube drainage last 8 hours        11/13/21  0630 11/14/21  0335 11/15/21  0436   Weight: 81.4 kg (179 lb 8 oz) 81.4 kg (179 lb 7 oz) 80 kg (176 lb 6.4 oz)         Objective    Results Review:        WBC WBC   Date Value Ref Range Status   11/12/2021 8.74 3.40 - 10.80 10*3/mm3 Final      HGB Hemoglobin   Date Value Ref Range Status   11/12/2021 15.7 13.0 - 17.7 g/dL Final      HCT Hematocrit   Date Value Ref Range Status   11/12/2021 44.6 37.5 - 51.0 % Final      Platelets Platelets   Date Value Ref Range Status   11/12/2021 203 140 - 450 10*3/mm3 Final        PT/INR:    Protime   Date Value Ref Range Status   11/12/2021 13.0 11.7 - 14.2 Seconds Final   /  INR   Date Value Ref Range Status   11/12/2021 1.00 0.90 - 1.10 Final       Sodium Sodium   Date Value Ref Range Status   11/12/2021 140 136 - 145 mmol/L Final      Potassium Potassium   Date Value Ref Range Status   11/12/2021 4.2 3.5 - 5.2 mmol/L Final      Chloride Chloride   Date Value Ref Range Status   11/12/2021 104 98 - 107 mmol/L Final      Bicarbonate CO2   Date Value Ref Range Status   11/12/2021 26.4 22.0 - 29.0 mmol/L Final      BUN BUN   Date Value Ref Range Status   11/12/2021 12 8 - 23 mg/dL Final      Creatinine Creatinine   Date Value Ref Range Status   11/12/2021 0.93 0.76 - 1.27 mg/dL Final      Calcium Calcium   Date Value Ref Range Status   11/12/2021 9.4 8.6 - 10.5 mg/dL Final      Magnesium Magnesium   Date  Value Ref Range Status   11/12/2021 2.3 1.6 - 2.4 mg/dL Final          aspirin, 81 mg, Oral, Daily  atorvastatin, 20 mg, Oral, Nightly  carvedilol, 3.125 mg, Oral, BID With Meals  enoxaparin, 40 mg, Subcutaneous, Q24H  glipizide, 5 mg, Oral, QAM AC  insulin lispro, 0-7 Units, Subcutaneous, TID AC  sodium chloride, 10 mL, Intravenous, Q12H               Patient Active Problem List   Diagnosis Code   • Chest pain R07.9   • Non Q wave myocardial infarction (HCC) I21.4   • Coronary artery disease of native heart with stable angina pectoris ([facility]) I25.118   • CAD (coronary artery disease) I25.10   • Essential hypertension I10   • Type 2 diabetes mellitus with circulatory disorder ([facility]) E11.59   • Scleroderma ([facility]) M34.9       Assessment & Plan      Plan for OR tomorrow    CLAUDIA Flores  11/15/21  08:54 EST

## 2021-11-16 ENCOUNTER — ANCILLARY PROCEDURE (OUTPATIENT)
Dept: PERIOP | Facility: HOSPITAL | Age: 71
End: 2021-11-16

## 2021-11-16 ENCOUNTER — APPOINTMENT (OUTPATIENT)
Dept: GENERAL RADIOLOGY | Facility: HOSPITAL | Age: 71
End: 2021-11-16

## 2021-11-16 ENCOUNTER — ANESTHESIA (OUTPATIENT)
Dept: PERIOP | Facility: HOSPITAL | Age: 71
End: 2021-11-16

## 2021-11-16 LAB
ACT BLD: 109 SECONDS (ref 82–152)
ACT BLD: 114 SECONDS (ref 82–152)
ACT BLD: 373 SECONDS (ref 82–152)
ACT BLD: 406 SECONDS (ref 82–152)
ACT BLD: 439 SECONDS (ref 82–152)
ALBUMIN SERPL-MCNC: 4.3 G/DL (ref 3.5–5.2)
ALBUMIN SERPL-MCNC: 5 G/DL (ref 3.5–5.2)
ANION GAP SERPL CALCULATED.3IONS-SCNC: 10.4 MMOL/L (ref 5–15)
ANION GAP SERPL CALCULATED.3IONS-SCNC: 7.8 MMOL/L (ref 5–15)
ANION GAP SERPL CALCULATED.3IONS-SCNC: 8.4 MMOL/L (ref 5–15)
APTT PPP: 28.4 SECONDS (ref 22.7–35.4)
ARTERIAL PATENCY WRIST A: ABNORMAL
ATMOSPHERIC PRESS: 751.5 MMHG
ATMOSPHERIC PRESS: 751.8 MMHG
ATMOSPHERIC PRESS: 752.5 MMHG
BASE EXCESS BLDA CALC-SCNC: -1.8 MMOL/L (ref 0–2)
BASE EXCESS BLDA CALC-SCNC: -2 MMOL/L (ref -5–5)
BASE EXCESS BLDA CALC-SCNC: -2 MMOL/L (ref 0–2)
BASE EXCESS BLDA CALC-SCNC: -2 MMOL/L (ref 0–2)
BASE EXCESS BLDA CALC-SCNC: -3 MMOL/L (ref -5–5)
BASE EXCESS BLDA CALC-SCNC: -4 MMOL/L (ref -5–5)
BASE EXCESS BLDA CALC-SCNC: 0 MMOL/L (ref -5–5)
BASE EXCESS BLDA CALC-SCNC: 1 MMOL/L (ref -5–5)
BASE EXCESS BLDA CALC-SCNC: 1 MMOL/L (ref -5–5)
BASOPHILS # BLD AUTO: 0.08 10*3/MM3 (ref 0–0.2)
BASOPHILS NFR BLD AUTO: 0.5 % (ref 0–1.5)
BDY SITE: ABNORMAL
BUN SERPL-MCNC: 14 MG/DL (ref 8–23)
BUN SERPL-MCNC: 14 MG/DL (ref 8–23)
BUN SERPL-MCNC: 18 MG/DL (ref 8–23)
BUN/CREAT SERPL: 14.7 (ref 7–25)
BUN/CREAT SERPL: 15.9 (ref 7–25)
BUN/CREAT SERPL: 22 (ref 7–25)
CA-I BLD-MCNC: 5.2 MG/DL (ref 4.6–5.4)
CA-I BLDA-SCNC: ABNORMAL MMOL/L
CA-I SERPL ISE-MCNC: 1.29 MMOL/L (ref 1.15–1.35)
CALCIUM SPEC-SCNC: 8.5 MG/DL (ref 8.6–10.5)
CALCIUM SPEC-SCNC: 8.6 MG/DL (ref 8.6–10.5)
CALCIUM SPEC-SCNC: 8.7 MG/DL (ref 8.6–10.5)
CHLORIDE SERPL-SCNC: 103 MMOL/L (ref 98–107)
CHLORIDE SERPL-SCNC: 109 MMOL/L (ref 98–107)
CHLORIDE SERPL-SCNC: 110 MMOL/L (ref 98–107)
CO2 BLDA-SCNC: 23 MMOL/L (ref 24–29)
CO2 BLDA-SCNC: 24 MMOL/L (ref 24–29)
CO2 BLDA-SCNC: 25 MMOL/L (ref 24–29)
CO2 BLDA-SCNC: 27 MMOL/L (ref 24–29)
CO2 BLDA-SCNC: 28 MMOL/L (ref 24–29)
CO2 BLDA-SCNC: 28 MMOL/L (ref 24–29)
CO2 SERPL-SCNC: 21.6 MMOL/L (ref 22–29)
CO2 SERPL-SCNC: 22.6 MMOL/L (ref 22–29)
CO2 SERPL-SCNC: 23.2 MMOL/L (ref 22–29)
CREAT SERPL-MCNC: 0.82 MG/DL (ref 0.76–1.27)
CREAT SERPL-MCNC: 0.88 MG/DL (ref 0.76–1.27)
CREAT SERPL-MCNC: 0.95 MG/DL (ref 0.76–1.27)
DEPRECATED RDW RBC AUTO: 41.4 FL (ref 37–54)
DEPRECATED RDW RBC AUTO: 43.5 FL (ref 37–54)
EOSINOPHIL # BLD AUTO: 0.53 10*3/MM3 (ref 0–0.4)
EOSINOPHIL NFR BLD AUTO: 3.2 % (ref 0.3–6.2)
ERYTHROCYTE [DISTWIDTH] IN BLOOD BY AUTOMATED COUNT: 12.5 % (ref 12.3–15.4)
ERYTHROCYTE [DISTWIDTH] IN BLOOD BY AUTOMATED COUNT: 12.6 % (ref 12.3–15.4)
FIBRINOGEN PPP-MCNC: 241 MG/DL (ref 219–464)
GFR SERPL CREATININE-BSD FRML MDRD: 78 ML/MIN/1.73
GFR SERPL CREATININE-BSD FRML MDRD: 85 ML/MIN/1.73
GFR SERPL CREATININE-BSD FRML MDRD: 92 ML/MIN/1.73
GLUCOSE BLDC GLUCOMTR-MCNC: 107 MG/DL (ref 70–130)
GLUCOSE BLDC GLUCOMTR-MCNC: 119 MG/DL (ref 70–130)
GLUCOSE BLDC GLUCOMTR-MCNC: 131 MG/DL (ref 70–130)
GLUCOSE BLDC GLUCOMTR-MCNC: 137 MG/DL (ref 70–130)
GLUCOSE BLDC GLUCOMTR-MCNC: 142 MG/DL (ref 70–130)
GLUCOSE BLDC GLUCOMTR-MCNC: 143 MG/DL (ref 70–130)
GLUCOSE BLDC GLUCOMTR-MCNC: 145 MG/DL (ref 70–130)
GLUCOSE BLDC GLUCOMTR-MCNC: 148 MG/DL (ref 70–130)
GLUCOSE BLDC GLUCOMTR-MCNC: 152 MG/DL (ref 70–130)
GLUCOSE BLDC GLUCOMTR-MCNC: 154 MG/DL (ref 70–130)
GLUCOSE BLDC GLUCOMTR-MCNC: 160 MG/DL (ref 70–130)
GLUCOSE BLDC GLUCOMTR-MCNC: 162 MG/DL (ref 70–130)
GLUCOSE BLDC GLUCOMTR-MCNC: 164 MG/DL (ref 70–130)
GLUCOSE BLDC GLUCOMTR-MCNC: 165 MG/DL (ref 70–130)
GLUCOSE BLDC GLUCOMTR-MCNC: 172 MG/DL (ref 70–130)
GLUCOSE BLDC GLUCOMTR-MCNC: 185 MG/DL (ref 70–130)
GLUCOSE BLDC GLUCOMTR-MCNC: 195 MG/DL (ref 70–130)
GLUCOSE BLDC GLUCOMTR-MCNC: 221 MG/DL (ref 70–130)
GLUCOSE BLDC GLUCOMTR-MCNC: 243 MG/DL (ref 70–130)
GLUCOSE SERPL-MCNC: 129 MG/DL (ref 65–99)
GLUCOSE SERPL-MCNC: 155 MG/DL (ref 65–99)
GLUCOSE SERPL-MCNC: 247 MG/DL (ref 65–99)
HCO3 BLDA-SCNC: 21.5 MMOL/L (ref 22–26)
HCO3 BLDA-SCNC: 22.4 MMOL/L (ref 22–28)
HCO3 BLDA-SCNC: 23 MMOL/L (ref 22–26)
HCO3 BLDA-SCNC: 24 MMOL/L (ref 22–26)
HCO3 BLDA-SCNC: 24.4 MMOL/L (ref 22–28)
HCO3 BLDA-SCNC: 24.6 MMOL/L (ref 22–28)
HCO3 BLDA-SCNC: 25.9 MMOL/L (ref 22–26)
HCO3 BLDA-SCNC: 26.2 MMOL/L (ref 22–26)
HCO3 BLDA-SCNC: 26.3 MMOL/L (ref 22–26)
HCT VFR BLD AUTO: 36.9 % (ref 37.5–51)
HCT VFR BLD AUTO: 37.1 % (ref 37.5–51)
HCT VFR BLDA CALC: 29 % (ref 38–51)
HCT VFR BLDA CALC: 31 % (ref 38–51)
HCT VFR BLDA CALC: 33 % (ref 38–51)
HCT VFR BLDA CALC: 33 % (ref 38–51)
HCT VFR BLDA CALC: 37 % (ref 38–51)
HCT VFR BLDA CALC: 38 % (ref 38–51)
HGB BLD-MCNC: 12.8 G/DL (ref 13–17.7)
HGB BLD-MCNC: 13.1 G/DL (ref 13–17.7)
HGB BLDA-MCNC: 10.5 G/DL (ref 12–17)
HGB BLDA-MCNC: 11.2 G/DL (ref 12–17)
HGB BLDA-MCNC: 11.2 G/DL (ref 12–17)
HGB BLDA-MCNC: 12.6 G/DL (ref 12–17)
HGB BLDA-MCNC: 12.9 G/DL (ref 12–17)
HGB BLDA-MCNC: 9.9 G/DL (ref 12–17)
IMM GRANULOCYTES # BLD AUTO: 0.13 10*3/MM3 (ref 0–0.05)
IMM GRANULOCYTES NFR BLD AUTO: 0.8 % (ref 0–0.5)
INHALED O2 CONCENTRATION: 100 %
INHALED O2 CONCENTRATION: 100 %
INHALED O2 CONCENTRATION: 40 %
INR PPP: 1.31 (ref 0.9–1.1)
LYMPHOCYTES # BLD AUTO: 0.95 10*3/MM3 (ref 0.7–3.1)
LYMPHOCYTES NFR BLD AUTO: 5.8 % (ref 19.6–45.3)
MAGNESIUM SERPL-MCNC: 2.5 MG/DL (ref 1.6–2.4)
MAGNESIUM SERPL-MCNC: 3 MG/DL (ref 1.6–2.4)
MCH RBC QN AUTO: 32 PG (ref 26.6–33)
MCH RBC QN AUTO: 32.3 PG (ref 26.6–33)
MCHC RBC AUTO-ENTMCNC: 34.7 G/DL (ref 31.5–35.7)
MCHC RBC AUTO-ENTMCNC: 35.3 G/DL (ref 31.5–35.7)
MCV RBC AUTO: 90.5 FL (ref 79–97)
MCV RBC AUTO: 93.2 FL (ref 79–97)
MODALITY: ABNORMAL
MONOCYTES # BLD AUTO: 1.02 10*3/MM3 (ref 0.1–0.9)
MONOCYTES NFR BLD AUTO: 6.2 % (ref 5–12)
NEUTROPHILS NFR BLD AUTO: 13.68 10*3/MM3 (ref 1.7–7)
NEUTROPHILS NFR BLD AUTO: 83.5 % (ref 42.7–76)
NRBC BLD AUTO-RTO: 0 /100 WBC (ref 0–0.2)
O2 A-A PPRESDIFF RESPIRATORY: 0.3 MMHG
PCO2 BLDA: 36.3 MM HG (ref 35–45)
PCO2 BLDA: 36.7 MM HG (ref 35–45)
PCO2 BLDA: 43.3 MM HG (ref 35–45)
PCO2 BLDA: 44.2 MM HG (ref 35–45)
PCO2 BLDA: 46.1 MM HG (ref 35–45)
PCO2 BLDA: 46.2 MM HG (ref 35–45)
PCO2 BLDA: 46.2 MM HG (ref 35–45)
PCO2 BLDA: 47 MM HG (ref 35–45)
PCO2 BLDA: 49 MM HG (ref 35–45)
PEEP RESPIRATORY: 7 CM[H2O]
PH BLDA: 7.31 PH UNITS (ref 7.35–7.45)
PH BLDA: 7.32 PH UNITS (ref 7.35–7.6)
PH BLDA: 7.33 PH UNITS (ref 7.35–7.45)
PH BLDA: 7.33 PH UNITS (ref 7.35–7.6)
PH BLDA: 7.36 PH UNITS (ref 7.35–7.6)
PH BLDA: 7.36 PH UNITS (ref 7.35–7.6)
PH BLDA: 7.38 PH UNITS (ref 7.35–7.6)
PH BLDA: 7.38 PH UNITS (ref 7.35–7.6)
PH BLDA: 7.4 PH UNITS (ref 7.35–7.45)
PHOSPHATE SERPL-MCNC: 1.8 MG/DL (ref 2.5–4.5)
PHOSPHATE SERPL-MCNC: 2.7 MG/DL (ref 2.5–4.5)
PLATELET # BLD AUTO: 137 10*3/MM3 (ref 140–450)
PLATELET # BLD AUTO: 147 10*3/MM3 (ref 140–450)
PMV BLD AUTO: 9.2 FL (ref 6–12)
PMV BLD AUTO: 9.4 FL (ref 6–12)
PO2 BLDA: 119 MMHG (ref 80–105)
PO2 BLDA: 130 MMHG (ref 80–105)
PO2 BLDA: 218.2 MM HG (ref 80–100)
PO2 BLDA: 238.1 MM HG (ref 80–100)
PO2 BLDA: 411 MMHG (ref 80–105)
PO2 BLDA: 467 MMHG (ref 80–105)
PO2 BLDA: 483 MMHG (ref 80–105)
PO2 BLDA: 53 MMHG (ref 80–105)
PO2 BLDA: 80.5 MM HG (ref 80–100)
POTASSIUM BLDA-SCNC: 3.9 MMOL/L (ref 3.5–4.9)
POTASSIUM BLDA-SCNC: 3.9 MMOL/L (ref 3.5–4.9)
POTASSIUM BLDA-SCNC: 4.1 MMOL/L (ref 3.5–4.9)
POTASSIUM BLDA-SCNC: 4.1 MMOL/L (ref 3.5–4.9)
POTASSIUM BLDA-SCNC: 4.3 MMOL/L (ref 3.5–4.9)
POTASSIUM BLDA-SCNC: 4.6 MMOL/L (ref 3.5–4.9)
POTASSIUM SERPL-SCNC: 4 MMOL/L (ref 3.5–5.2)
POTASSIUM SERPL-SCNC: 4.2 MMOL/L (ref 3.5–5.2)
POTASSIUM SERPL-SCNC: 4.2 MMOL/L (ref 3.5–5.2)
PROTHROMBIN TIME: 16.1 SECONDS (ref 11.7–14.2)
PSV: 8 CMH2O
PSV: 8 CMH2O
QT INTERVAL: 396 MS
RBC # BLD AUTO: 3.96 10*6/MM3 (ref 4.14–5.8)
RBC # BLD AUTO: 4.1 10*6/MM3 (ref 4.14–5.8)
SAO2 % BLDA: 100 % (ref 95–98)
SAO2 % BLDA: 84 % (ref 95–98)
SAO2 % BLDA: 99 % (ref 95–98)
SAO2 % BLDA: 99 % (ref 95–98)
SAO2 % BLDCOA: 94.8 % (ref 92–99)
SAO2 % BLDCOA: 99.7 % (ref 92–99)
SAO2 % BLDCOA: 99.8 % (ref 92–99)
SET MECH RESP RATE: 18
SODIUM SERPL-SCNC: 136 MMOL/L (ref 136–145)
SODIUM SERPL-SCNC: 139 MMOL/L (ref 136–145)
SODIUM SERPL-SCNC: 141 MMOL/L (ref 136–145)
TOTAL RATE: 11 BREATHS/MINUTE
TOTAL RATE: 12 BREATHS/MINUTE
TOTAL RATE: 18 BREATHS/MINUTE
VENTILATOR MODE: AC
VT ON VENT VENT: 600 ML
VT ON VENT VENT: 641 ML
WBC # BLD AUTO: 16.05 10*3/MM3 (ref 3.4–10.8)
WBC # BLD AUTO: 16.39 10*3/MM3 (ref 3.4–10.8)

## 2021-11-16 PROCEDURE — 25010000002 ALBUMIN HUMAN 5% PER 50 ML: Performed by: NURSE PRACTITIONER

## 2021-11-16 PROCEDURE — 85347 COAGULATION TIME ACTIVATED: CPT

## 2021-11-16 PROCEDURE — 33534 CABG ARTERIAL TWO: CPT | Performed by: THORACIC SURGERY (CARDIOTHORACIC VASCULAR SURGERY)

## 2021-11-16 PROCEDURE — 25010000002 METOCLOPRAMIDE PER 10 MG: Performed by: NURSE PRACTITIONER

## 2021-11-16 PROCEDURE — C1751 CATH, INF, PER/CENT/MIDLINE: HCPCS | Performed by: ANESTHESIOLOGY

## 2021-11-16 PROCEDURE — 85018 HEMOGLOBIN: CPT

## 2021-11-16 PROCEDURE — 33534 CABG ARTERIAL TWO: CPT | Performed by: PHYSICIAN ASSISTANT

## 2021-11-16 PROCEDURE — 25010000002 FENTANYL CITRATE (PF) 50 MCG/ML SOLUTION: Performed by: ANESTHESIOLOGY

## 2021-11-16 PROCEDURE — 5A1221Z PERFORMANCE OF CARDIAC OUTPUT, CONTINUOUS: ICD-10-PCS | Performed by: THORACIC SURGERY (CARDIOTHORACIC VASCULAR SURGERY)

## 2021-11-16 PROCEDURE — 85027 COMPLETE CBC AUTOMATED: CPT | Performed by: NURSE PRACTITIONER

## 2021-11-16 PROCEDURE — C1713 ANCHOR/SCREW BN/BN,TIS/BN: HCPCS | Performed by: THORACIC SURGERY (CARDIOTHORACIC VASCULAR SURGERY)

## 2021-11-16 PROCEDURE — 93010 ELECTROCARDIOGRAM REPORT: CPT | Performed by: INTERNAL MEDICINE

## 2021-11-16 PROCEDURE — P9041 ALBUMIN (HUMAN),5%, 50ML: HCPCS | Performed by: NURSE PRACTITIONER

## 2021-11-16 PROCEDURE — 0 PROTAMINE SULFATE PER 10 MG: Performed by: THORACIC SURGERY (CARDIOTHORACIC VASCULAR SURGERY)

## 2021-11-16 PROCEDURE — 94799 UNLISTED PULMONARY SVC/PX: CPT

## 2021-11-16 PROCEDURE — 02110Z9 BYPASS CORONARY ARTERY, TWO ARTERIES FROM LEFT INTERNAL MAMMARY, OPEN APPROACH: ICD-10-PCS | Performed by: THORACIC SURGERY (CARDIOTHORACIC VASCULAR SURGERY)

## 2021-11-16 PROCEDURE — 71045 X-RAY EXAM CHEST 1 VIEW: CPT

## 2021-11-16 PROCEDURE — 25010000002 ALBUMIN HUMAN 25% PER 50 ML

## 2021-11-16 PROCEDURE — 25010000002 VANCOMYCIN 1 G RECONSTITUTED SOLUTION

## 2021-11-16 PROCEDURE — P9047 ALBUMIN (HUMAN), 25%, 50ML: HCPCS

## 2021-11-16 PROCEDURE — 80048 BASIC METABOLIC PNL TOTAL CA: CPT | Performed by: THORACIC SURGERY (CARDIOTHORACIC VASCULAR SURGERY)

## 2021-11-16 PROCEDURE — 82962 GLUCOSE BLOOD TEST: CPT

## 2021-11-16 PROCEDURE — 33508 ENDOSCOPIC VEIN HARVEST: CPT | Performed by: PHYSICIAN ASSISTANT

## 2021-11-16 PROCEDURE — 93005 ELECTROCARDIOGRAM TRACING: CPT | Performed by: NURSE PRACTITIONER

## 2021-11-16 PROCEDURE — A4648 IMPLANTABLE TISSUE MARKER: HCPCS | Performed by: THORACIC SURGERY (CARDIOTHORACIC VASCULAR SURGERY)

## 2021-11-16 PROCEDURE — 85730 THROMBOPLASTIN TIME PARTIAL: CPT | Performed by: NURSE PRACTITIONER

## 2021-11-16 PROCEDURE — 25010000002 HYDROMORPHONE PER 4 MG: Performed by: ANESTHESIOLOGY

## 2021-11-16 PROCEDURE — 25010000002 HEPARIN (PORCINE) PER 1000 UNITS: Performed by: ANESTHESIOLOGY

## 2021-11-16 PROCEDURE — 80069 RENAL FUNCTION PANEL: CPT | Performed by: NURSE PRACTITIONER

## 2021-11-16 PROCEDURE — 25010000002 MIDAZOLAM PER 1 MG: Performed by: ANESTHESIOLOGY

## 2021-11-16 PROCEDURE — 25010000002 MAGNESIUM SULFATE PER 500 MG OF MAGNESIUM: Performed by: ANESTHESIOLOGY

## 2021-11-16 PROCEDURE — 82330 ASSAY OF CALCIUM: CPT | Performed by: NURSE PRACTITIONER

## 2021-11-16 PROCEDURE — 25010000002 PROPOFOL 10 MG/ML EMULSION: Performed by: ANESTHESIOLOGY

## 2021-11-16 PROCEDURE — 82803 BLOOD GASES ANY COMBINATION: CPT

## 2021-11-16 PROCEDURE — 33518 CABG ARTERY-VEIN TWO: CPT | Performed by: PHYSICIAN ASSISTANT

## 2021-11-16 PROCEDURE — 33508 ENDOSCOPIC VEIN HARVEST: CPT | Performed by: THORACIC SURGERY (CARDIOTHORACIC VASCULAR SURGERY)

## 2021-11-16 PROCEDURE — 25010000002 HEPARIN (PORCINE) PER 1000 UNITS

## 2021-11-16 PROCEDURE — 82947 ASSAY GLUCOSE BLOOD QUANT: CPT

## 2021-11-16 PROCEDURE — 85014 HEMATOCRIT: CPT

## 2021-11-16 PROCEDURE — 021109W BYPASS CORONARY ARTERY, TWO ARTERIES FROM AORTA WITH AUTOLOGOUS VENOUS TISSUE, OPEN APPROACH: ICD-10-PCS | Performed by: THORACIC SURGERY (CARDIOTHORACIC VASCULAR SURGERY)

## 2021-11-16 PROCEDURE — 83735 ASSAY OF MAGNESIUM: CPT | Performed by: NURSE PRACTITIONER

## 2021-11-16 PROCEDURE — 94002 VENT MGMT INPAT INIT DAY: CPT

## 2021-11-16 PROCEDURE — 06BP4ZZ EXCISION OF RIGHT SAPHENOUS VEIN, PERCUTANEOUS ENDOSCOPIC APPROACH: ICD-10-PCS | Performed by: THORACIC SURGERY (CARDIOTHORACIC VASCULAR SURGERY)

## 2021-11-16 PROCEDURE — 25010000002 HEPARIN (PORCINE) PER 1000 UNITS: Performed by: THORACIC SURGERY (CARDIOTHORACIC VASCULAR SURGERY)

## 2021-11-16 PROCEDURE — 0W9D0ZZ DRAINAGE OF PERICARDIAL CAVITY, OPEN APPROACH: ICD-10-PCS | Performed by: THORACIC SURGERY (CARDIOTHORACIC VASCULAR SURGERY)

## 2021-11-16 PROCEDURE — 33518 CABG ARTERY-VEIN TWO: CPT | Performed by: THORACIC SURGERY (CARDIOTHORACIC VASCULAR SURGERY)

## 2021-11-16 PROCEDURE — C1729 CATH, DRAINAGE: HCPCS | Performed by: THORACIC SURGERY (CARDIOTHORACIC VASCULAR SURGERY)

## 2021-11-16 PROCEDURE — 85025 COMPLETE CBC W/AUTO DIFF WBC: CPT | Performed by: NURSE PRACTITIONER

## 2021-11-16 PROCEDURE — 25010000002 PHENYLEPHRINE 10 MG/ML SOLUTION 5 ML VIAL: Performed by: ANESTHESIOLOGY

## 2021-11-16 PROCEDURE — 85384 FIBRINOGEN ACTIVITY: CPT | Performed by: NURSE PRACTITIONER

## 2021-11-16 PROCEDURE — 25010000002 PAPAVERINE PER 60 MG: Performed by: THORACIC SURGERY (CARDIOTHORACIC VASCULAR SURGERY)

## 2021-11-16 PROCEDURE — 0 CEFAZOLIN IN DEXTROSE 2-4 GM/100ML-% SOLUTION: Performed by: NURSE PRACTITIONER

## 2021-11-16 PROCEDURE — 93318 ECHO TRANSESOPHAGEAL INTRAOP: CPT

## 2021-11-16 PROCEDURE — 25010000002 PHENYLEPHRINE 10 MG/ML SOLUTION: Performed by: NURSE PRACTITIONER

## 2021-11-16 PROCEDURE — 25010000002 ONDANSETRON PER 1 MG: Performed by: ANESTHESIOLOGY

## 2021-11-16 PROCEDURE — 85610 PROTHROMBIN TIME: CPT | Performed by: NURSE PRACTITIONER

## 2021-11-16 DEVICE — SS SUTURE, 4 PER SLEEVE
Type: IMPLANTABLE DEVICE | Site: STERNUM | Status: FUNCTIONAL
Brand: MYO/WIRE II

## 2021-11-16 DEVICE — SS SUTURE, 3 PER SLEEVE
Type: IMPLANTABLE DEVICE | Site: STERNUM | Status: FUNCTIONAL
Brand: MYO/WIRE II

## 2021-11-16 RX ORDER — ACETAMINOPHEN 160 MG/5ML
650 SOLUTION ORAL EVERY 4 HOURS
Status: DISCONTINUED | OUTPATIENT
Start: 2021-11-16 | End: 2021-11-17

## 2021-11-16 RX ORDER — MAGNESIUM SULFATE 1 G/100ML
1 INJECTION INTRAVENOUS EVERY 8 HOURS
Status: DISCONTINUED | OUTPATIENT
Start: 2021-11-16 | End: 2021-11-17

## 2021-11-16 RX ORDER — ACETAMINOPHEN 650 MG/1
650 SUPPOSITORY RECTAL EVERY 4 HOURS PRN
Status: DISCONTINUED | OUTPATIENT
Start: 2021-11-17 | End: 2021-11-20 | Stop reason: HOSPADM

## 2021-11-16 RX ORDER — DOPAMINE HYDROCHLORIDE 160 MG/100ML
2-20 INJECTION, SOLUTION INTRAVENOUS CONTINUOUS PRN
Status: DISCONTINUED | OUTPATIENT
Start: 2021-11-16 | End: 2021-11-17

## 2021-11-16 RX ORDER — NITROGLYCERIN 20 MG/100ML
5-200 INJECTION INTRAVENOUS
Status: DISCONTINUED | OUTPATIENT
Start: 2021-11-16 | End: 2021-11-17

## 2021-11-16 RX ORDER — ACETAMINOPHEN 160 MG/5ML
650 SOLUTION ORAL EVERY 4 HOURS PRN
Status: DISCONTINUED | OUTPATIENT
Start: 2021-11-17 | End: 2021-11-20 | Stop reason: HOSPADM

## 2021-11-16 RX ORDER — MORPHINE SULFATE 2 MG/ML
1 INJECTION, SOLUTION INTRAMUSCULAR; INTRAVENOUS EVERY 4 HOURS PRN
Status: DISCONTINUED | OUTPATIENT
Start: 2021-11-16 | End: 2021-11-20 | Stop reason: HOSPADM

## 2021-11-16 RX ORDER — SODIUM CHLORIDE 9 MG/ML
30 INJECTION, SOLUTION INTRAVENOUS CONTINUOUS PRN
Status: DISCONTINUED | OUTPATIENT
Start: 2021-11-16 | End: 2021-11-20 | Stop reason: HOSPADM

## 2021-11-16 RX ORDER — AMOXICILLIN 250 MG
2 CAPSULE ORAL NIGHTLY
Status: DISCONTINUED | OUTPATIENT
Start: 2021-11-17 | End: 2021-11-20 | Stop reason: HOSPADM

## 2021-11-16 RX ORDER — NALOXONE HCL 0.4 MG/ML
0.4 VIAL (ML) INJECTION
Status: DISCONTINUED | OUTPATIENT
Start: 2021-11-16 | End: 2021-11-20 | Stop reason: HOSPADM

## 2021-11-16 RX ORDER — MIDAZOLAM HYDROCHLORIDE 1 MG/ML
INJECTION INTRAMUSCULAR; INTRAVENOUS AS NEEDED
Status: DISCONTINUED | OUTPATIENT
Start: 2021-11-16 | End: 2021-11-16 | Stop reason: SURG

## 2021-11-16 RX ORDER — ONDANSETRON 2 MG/ML
INJECTION INTRAMUSCULAR; INTRAVENOUS AS NEEDED
Status: DISCONTINUED | OUTPATIENT
Start: 2021-11-16 | End: 2021-11-16 | Stop reason: SURG

## 2021-11-16 RX ORDER — ROCURONIUM BROMIDE 10 MG/ML
INJECTION, SOLUTION INTRAVENOUS AS NEEDED
Status: DISCONTINUED | OUTPATIENT
Start: 2021-11-16 | End: 2021-11-16 | Stop reason: SURG

## 2021-11-16 RX ORDER — PROTAMINE SULFATE 10 MG/ML
INJECTION, SOLUTION INTRAVENOUS AS NEEDED
Status: DISCONTINUED | OUTPATIENT
Start: 2021-11-16 | End: 2021-11-16 | Stop reason: HOSPADM

## 2021-11-16 RX ORDER — POTASSIUM CHLORIDE 1.5 G/1.77G
40 POWDER, FOR SOLUTION ORAL AS NEEDED
Status: DISCONTINUED | OUTPATIENT
Start: 2021-11-16 | End: 2021-11-20 | Stop reason: HOSPADM

## 2021-11-16 RX ORDER — CYCLOBENZAPRINE HCL 10 MG
10 TABLET ORAL EVERY 8 HOURS PRN
Status: DISCONTINUED | OUTPATIENT
Start: 2021-11-17 | End: 2021-11-20 | Stop reason: HOSPADM

## 2021-11-16 RX ORDER — HYDROCODONE BITARTRATE AND ACETAMINOPHEN 5; 325 MG/1; MG/1
2 TABLET ORAL EVERY 4 HOURS PRN
Status: DISCONTINUED | OUTPATIENT
Start: 2021-11-16 | End: 2021-11-20 | Stop reason: HOSPADM

## 2021-11-16 RX ORDER — POTASSIUM CHLORIDE 7.45 MG/ML
10 INJECTION INTRAVENOUS
Status: DISCONTINUED | OUTPATIENT
Start: 2021-11-16 | End: 2021-11-20 | Stop reason: HOSPADM

## 2021-11-16 RX ORDER — BISACODYL 10 MG
10 SUPPOSITORY, RECTAL RECTAL DAILY PRN
Status: DISCONTINUED | OUTPATIENT
Start: 2021-11-17 | End: 2021-11-20 | Stop reason: HOSPADM

## 2021-11-16 RX ORDER — BISACODYL 5 MG/1
10 TABLET, DELAYED RELEASE ORAL DAILY PRN
Status: DISCONTINUED | OUTPATIENT
Start: 2021-11-16 | End: 2021-11-20 | Stop reason: HOSPADM

## 2021-11-16 RX ORDER — HEPARIN SODIUM 5000 [USP'U]/ML
INJECTION, SOLUTION INTRAVENOUS; SUBCUTANEOUS AS NEEDED
Status: DISCONTINUED | OUTPATIENT
Start: 2021-11-16 | End: 2021-11-16 | Stop reason: HOSPADM

## 2021-11-16 RX ORDER — POTASSIUM CHLORIDE 29.8 MG/ML
20 INJECTION INTRAVENOUS
Status: DISCONTINUED | OUTPATIENT
Start: 2021-11-16 | End: 2021-11-20 | Stop reason: HOSPADM

## 2021-11-16 RX ORDER — NICARDIPINE HYDROCHLORIDE 2.5 MG/ML
INJECTION INTRAVENOUS AS NEEDED
Status: DISCONTINUED | OUTPATIENT
Start: 2021-11-16 | End: 2021-11-16 | Stop reason: SURG

## 2021-11-16 RX ORDER — MEPERIDINE HYDROCHLORIDE 25 MG/ML
25 INJECTION INTRAMUSCULAR; INTRAVENOUS; SUBCUTANEOUS EVERY 4 HOURS PRN
Status: ACTIVE | OUTPATIENT
Start: 2021-11-16 | End: 2021-11-16

## 2021-11-16 RX ORDER — OXYCODONE HYDROCHLORIDE 5 MG/1
10 TABLET ORAL EVERY 4 HOURS PRN
Status: DISCONTINUED | OUTPATIENT
Start: 2021-11-16 | End: 2021-11-20 | Stop reason: HOSPADM

## 2021-11-16 RX ORDER — SODIUM CHLORIDE 0.9 % (FLUSH) 0.9 %
30 SYRINGE (ML) INJECTION ONCE AS NEEDED
Status: DISCONTINUED | OUTPATIENT
Start: 2021-11-16 | End: 2021-11-16

## 2021-11-16 RX ORDER — PROPOFOL 10 MG/ML
VIAL (ML) INTRAVENOUS CONTINUOUS PRN
Status: DISCONTINUED | OUTPATIENT
Start: 2021-11-16 | End: 2021-11-16 | Stop reason: SURG

## 2021-11-16 RX ORDER — POLYETHYLENE GLYCOL 3350 17 G/17G
17 POWDER, FOR SOLUTION ORAL DAILY PRN
Status: DISCONTINUED | OUTPATIENT
Start: 2021-11-16 | End: 2021-11-20 | Stop reason: HOSPADM

## 2021-11-16 RX ORDER — ACETAMINOPHEN 325 MG/1
650 TABLET ORAL EVERY 4 HOURS PRN
Status: DISCONTINUED | OUTPATIENT
Start: 2021-11-17 | End: 2021-11-20 | Stop reason: HOSPADM

## 2021-11-16 RX ORDER — KETAMINE HYDROCHLORIDE 10 MG/ML
INJECTION INTRAMUSCULAR; INTRAVENOUS AS NEEDED
Status: DISCONTINUED | OUTPATIENT
Start: 2021-11-16 | End: 2021-11-16 | Stop reason: SURG

## 2021-11-16 RX ORDER — PANTOPRAZOLE SODIUM 40 MG/10ML
40 INJECTION, POWDER, LYOPHILIZED, FOR SOLUTION INTRAVENOUS ONCE
Status: DISCONTINUED | OUTPATIENT
Start: 2021-11-16 | End: 2021-11-20 | Stop reason: HOSPADM

## 2021-11-16 RX ORDER — NOREPINEPHRINE BIT/0.9 % NACL 8 MG/250ML
.02-.3 INFUSION BOTTLE (ML) INTRAVENOUS CONTINUOUS PRN
Status: DISCONTINUED | OUTPATIENT
Start: 2021-11-16 | End: 2021-11-17

## 2021-11-16 RX ORDER — POTASSIUM CHLORIDE 750 MG/1
40 TABLET, FILM COATED, EXTENDED RELEASE ORAL AS NEEDED
Status: DISCONTINUED | OUTPATIENT
Start: 2021-11-16 | End: 2021-11-20 | Stop reason: HOSPADM

## 2021-11-16 RX ORDER — ASPIRIN 81 MG/1
81 TABLET ORAL DAILY
Status: DISCONTINUED | OUTPATIENT
Start: 2021-11-17 | End: 2021-11-20 | Stop reason: HOSPADM

## 2021-11-16 RX ORDER — ACETAMINOPHEN 650 MG/1
650 SUPPOSITORY RECTAL EVERY 4 HOURS
Status: DISCONTINUED | OUTPATIENT
Start: 2021-11-16 | End: 2021-11-17

## 2021-11-16 RX ORDER — HEPARIN SODIUM 1000 [USP'U]/ML
INJECTION, SOLUTION INTRAVENOUS; SUBCUTANEOUS AS NEEDED
Status: DISCONTINUED | OUTPATIENT
Start: 2021-11-16 | End: 2021-11-16 | Stop reason: SURG

## 2021-11-16 RX ORDER — MIDAZOLAM HYDROCHLORIDE 1 MG/ML
2 INJECTION INTRAMUSCULAR; INTRAVENOUS
Status: DISCONTINUED | OUTPATIENT
Start: 2021-11-16 | End: 2021-11-17

## 2021-11-16 RX ORDER — MILRINONE LACTATE 0.2 MG/ML
.25-.375 INJECTION, SOLUTION INTRAVENOUS CONTINUOUS PRN
Status: DISCONTINUED | OUTPATIENT
Start: 2021-11-16 | End: 2021-11-17

## 2021-11-16 RX ORDER — CHLORHEXIDINE GLUCONATE 0.12 MG/ML
15 RINSE ORAL EVERY 12 HOURS
Status: DISCONTINUED | OUTPATIENT
Start: 2021-11-16 | End: 2021-11-20 | Stop reason: HOSPADM

## 2021-11-16 RX ORDER — METOCLOPRAMIDE HYDROCHLORIDE 5 MG/ML
10 INJECTION INTRAMUSCULAR; INTRAVENOUS EVERY 6 HOURS
Status: DISCONTINUED | OUTPATIENT
Start: 2021-11-16 | End: 2021-11-17

## 2021-11-16 RX ORDER — CEFAZOLIN SODIUM 2 G/100ML
2 INJECTION, SOLUTION INTRAVENOUS EVERY 8 HOURS
Status: COMPLETED | OUTPATIENT
Start: 2021-11-16 | End: 2021-11-18

## 2021-11-16 RX ORDER — SODIUM CHLORIDE 9 MG/ML
30 INJECTION, SOLUTION INTRAVENOUS CONTINUOUS PRN
Status: DISCONTINUED | OUTPATIENT
Start: 2021-11-16 | End: 2021-11-16

## 2021-11-16 RX ORDER — SODIUM CHLORIDE 0.9 % (FLUSH) 0.9 %
30 SYRINGE (ML) INJECTION ONCE AS NEEDED
Status: DISCONTINUED | OUTPATIENT
Start: 2021-11-16 | End: 2021-11-20 | Stop reason: HOSPADM

## 2021-11-16 RX ORDER — PAPAVERINE HYDROCHLORIDE 30 MG/ML
INJECTION INTRAMUSCULAR; INTRAVENOUS AS NEEDED
Status: DISCONTINUED | OUTPATIENT
Start: 2021-11-16 | End: 2021-11-16 | Stop reason: HOSPADM

## 2021-11-16 RX ORDER — AMINOCAPROIC ACID 250 MG/ML
INJECTION, SOLUTION INTRAVENOUS AS NEEDED
Status: DISCONTINUED | OUTPATIENT
Start: 2021-11-16 | End: 2021-11-16 | Stop reason: SURG

## 2021-11-16 RX ORDER — SODIUM CHLORIDE 9 MG/ML
30 INJECTION, SOLUTION INTRAVENOUS CONTINUOUS
Status: DISCONTINUED | OUTPATIENT
Start: 2021-11-16 | End: 2021-11-20 | Stop reason: HOSPADM

## 2021-11-16 RX ORDER — ONDANSETRON 2 MG/ML
4 INJECTION INTRAMUSCULAR; INTRAVENOUS EVERY 6 HOURS PRN
Status: DISCONTINUED | OUTPATIENT
Start: 2021-11-16 | End: 2021-11-20 | Stop reason: HOSPADM

## 2021-11-16 RX ORDER — PHENYLEPHRINE HCL IN 0.9% NACL 0.5 MG/5ML
.2-2 SYRINGE (ML) INTRAVENOUS CONTINUOUS PRN
Status: DISCONTINUED | OUTPATIENT
Start: 2021-11-16 | End: 2021-11-17

## 2021-11-16 RX ORDER — PANTOPRAZOLE SODIUM 40 MG/1
40 TABLET, DELAYED RELEASE ORAL EVERY MORNING
Status: DISCONTINUED | OUTPATIENT
Start: 2021-11-17 | End: 2021-11-20 | Stop reason: HOSPADM

## 2021-11-16 RX ORDER — ACETAMINOPHEN 325 MG/1
650 TABLET ORAL EVERY 4 HOURS
Status: DISCONTINUED | OUTPATIENT
Start: 2021-11-16 | End: 2021-11-17

## 2021-11-16 RX ORDER — FENTANYL CITRATE 50 UG/ML
INJECTION, SOLUTION INTRAMUSCULAR; INTRAVENOUS AS NEEDED
Status: DISCONTINUED | OUTPATIENT
Start: 2021-11-16 | End: 2021-11-16 | Stop reason: SURG

## 2021-11-16 RX ORDER — DEXTROSE MONOHYDRATE 25 G/50ML
25-50 INJECTION, SOLUTION INTRAVENOUS
Status: DISCONTINUED | OUTPATIENT
Start: 2021-11-16 | End: 2021-11-16

## 2021-11-16 RX ORDER — DEXMEDETOMIDINE HYDROCHLORIDE 4 UG/ML
INJECTION, SOLUTION INTRAVENOUS CONTINUOUS PRN
Status: DISCONTINUED | OUTPATIENT
Start: 2021-11-16 | End: 2021-11-16 | Stop reason: SURG

## 2021-11-16 RX ORDER — MORPHINE SULFATE 2 MG/ML
4 INJECTION, SOLUTION INTRAMUSCULAR; INTRAVENOUS
Status: DISCONTINUED | OUTPATIENT
Start: 2021-11-16 | End: 2021-11-20 | Stop reason: HOSPADM

## 2021-11-16 RX ORDER — ALBUMIN, HUMAN INJ 5% 5 %
1500 SOLUTION INTRAVENOUS AS NEEDED
Status: DISPENSED | OUTPATIENT
Start: 2021-11-16 | End: 2021-11-17

## 2021-11-16 RX ORDER — MAGNESIUM SULFATE HEPTAHYDRATE 500 MG/ML
INJECTION, SOLUTION INTRAMUSCULAR; INTRAVENOUS AS NEEDED
Status: DISCONTINUED | OUTPATIENT
Start: 2021-11-16 | End: 2021-11-16 | Stop reason: SURG

## 2021-11-16 RX ORDER — PROPOFOL 10 MG/ML
VIAL (ML) INTRAVENOUS AS NEEDED
Status: DISCONTINUED | OUTPATIENT
Start: 2021-11-16 | End: 2021-11-16 | Stop reason: SURG

## 2021-11-16 RX ORDER — ALPRAZOLAM 0.25 MG/1
0.25 TABLET ORAL EVERY 8 HOURS PRN
Status: DISCONTINUED | OUTPATIENT
Start: 2021-11-16 | End: 2021-11-20 | Stop reason: HOSPADM

## 2021-11-16 RX ORDER — SODIUM CHLORIDE 9 MG/ML
INJECTION, SOLUTION INTRAVENOUS CONTINUOUS PRN
Status: DISCONTINUED | OUTPATIENT
Start: 2021-11-16 | End: 2021-11-16 | Stop reason: SURG

## 2021-11-16 RX ORDER — ATORVASTATIN CALCIUM 20 MG/1
40 TABLET, FILM COATED ORAL NIGHTLY
Status: DISCONTINUED | OUTPATIENT
Start: 2021-11-16 | End: 2021-11-20 | Stop reason: HOSPADM

## 2021-11-16 RX ORDER — HYDROMORPHONE HCL 110MG/55ML
PATIENT CONTROLLED ANALGESIA SYRINGE INTRAVENOUS AS NEEDED
Status: DISCONTINUED | OUTPATIENT
Start: 2021-11-16 | End: 2021-11-16 | Stop reason: SURG

## 2021-11-16 RX ADMIN — DEXMEDETOMIDINE HYDROCHLORIDE 1 MCG/HR: 4 INJECTION, SOLUTION INTRAVENOUS at 06:45

## 2021-11-16 RX ADMIN — ALBUMIN HUMAN 250 ML: 0.05 INJECTION, SOLUTION INTRAVENOUS at 12:05

## 2021-11-16 RX ADMIN — ALBUMIN HUMAN 250 ML: 0.05 INJECTION, SOLUTION INTRAVENOUS at 13:56

## 2021-11-16 RX ADMIN — ONDANSETRON 4 MG: 2 INJECTION INTRAMUSCULAR; INTRAVENOUS at 09:46

## 2021-11-16 RX ADMIN — AMINOCAPROIC ACID 10 G: 250 INJECTION, SOLUTION INTRAVENOUS at 09:39

## 2021-11-16 RX ADMIN — CEFAZOLIN SODIUM 2 G: 2 INJECTION, SOLUTION INTRAVENOUS at 18:10

## 2021-11-16 RX ADMIN — CEFAZOLIN SODIUM 2 G: 2 INJECTION, SOLUTION INTRAVENOUS at 09:38

## 2021-11-16 RX ADMIN — PROPOFOL 150 MG: 10 INJECTION, EMULSION INTRAVENOUS at 06:53

## 2021-11-16 RX ADMIN — SODIUM CHLORIDE: 9 INJECTION, SOLUTION INTRAVENOUS at 06:45

## 2021-11-16 RX ADMIN — ALBUMIN HUMAN 250 ML: 0.05 INJECTION, SOLUTION INTRAVENOUS at 13:11

## 2021-11-16 RX ADMIN — NICARDIPINE HYDROCHLORIDE 0.2 MG: 2.5 INJECTION, SOLUTION INTRAVENOUS at 09:10

## 2021-11-16 RX ADMIN — CHLORHEXIDINE GLUCONATE 1 APPLICATION: 500 CLOTH TOPICAL at 05:43

## 2021-11-16 RX ADMIN — HYDROCODONE BITARTRATE AND ACETAMINOPHEN 2 TABLET: 5; 325 TABLET ORAL at 20:54

## 2021-11-16 RX ADMIN — PHENYLEPHRINE HYDROCHLORIDE 0.25 MCG/KG/MIN: 10 INJECTION INTRAVENOUS at 06:51

## 2021-11-16 RX ADMIN — MUPIROCIN 1 APPLICATION: 20 OINTMENT TOPICAL at 18:12

## 2021-11-16 RX ADMIN — SODIUM CHLORIDE 30 ML/HR: 9 INJECTION, SOLUTION INTRAVENOUS at 11:09

## 2021-11-16 RX ADMIN — MUPIROCIN 1 APPLICATION: 20 OINTMENT TOPICAL at 05:59

## 2021-11-16 RX ADMIN — CHLORHEXIDINE GLUCONATE 15 ML: 1.2 RINSE ORAL at 18:12

## 2021-11-16 RX ADMIN — CHLORHEXIDINE GLUCONATE 15 ML: 1.2 RINSE ORAL at 05:59

## 2021-11-16 RX ADMIN — INSULIN HUMAN 3.4 UNITS/HR: 1 INJECTION, SOLUTION INTRAVENOUS at 04:54

## 2021-11-16 RX ADMIN — HYDROCODONE BITARTRATE AND ACETAMINOPHEN 2 TABLET: 5; 325 TABLET ORAL at 16:07

## 2021-11-16 RX ADMIN — KETAMINE HYDROCHLORIDE 10 MG: 10 INJECTION INTRAMUSCULAR; INTRAVENOUS at 06:46

## 2021-11-16 RX ADMIN — HEPARIN SODIUM 24000 UNITS: 1000 INJECTION INTRAVENOUS; SUBCUTANEOUS at 08:25

## 2021-11-16 RX ADMIN — METOCLOPRAMIDE HYDROCHLORIDE 10 MG: 5 INJECTION INTRAMUSCULAR; INTRAVENOUS at 11:40

## 2021-11-16 RX ADMIN — SODIUM CHLORIDE: 9 INJECTION, SOLUTION INTRAVENOUS at 09:17

## 2021-11-16 RX ADMIN — AMINOCAPROIC ACID 10 G: 250 INJECTION, SOLUTION INTRAVENOUS at 07:52

## 2021-11-16 RX ADMIN — HYDROMORPHONE HYDROCHLORIDE 1 MG: 2 INJECTION, SOLUTION INTRAMUSCULAR; INTRAVENOUS; SUBCUTANEOUS at 07:55

## 2021-11-16 RX ADMIN — Medication 50 MCG/KG/MIN: at 06:45

## 2021-11-16 RX ADMIN — Medication 0.2 MCG/KG/MIN: at 14:00

## 2021-11-16 RX ADMIN — ATORVASTATIN CALCIUM 40 MG: 20 TABLET, FILM COATED ORAL at 20:54

## 2021-11-16 RX ADMIN — METHADONE HYDROCHLORIDE 10 MG: 10 TABLET ORAL at 06:29

## 2021-11-16 RX ADMIN — CEFAZOLIN SODIUM 2 G: 2 INJECTION, SOLUTION INTRAVENOUS at 07:18

## 2021-11-16 RX ADMIN — SODIUM CHLORIDE 30 ML/HR: 9 INJECTION, SOLUTION INTRAVENOUS at 11:10

## 2021-11-16 RX ADMIN — FENTANYL CITRATE 100 MCG: 0.05 INJECTION, SOLUTION INTRAMUSCULAR; INTRAVENOUS at 06:45

## 2021-11-16 RX ADMIN — MAGNESIUM SULFATE HEPTAHYDRATE 2 G: 500 INJECTION, SOLUTION INTRAMUSCULAR; INTRAVENOUS at 09:39

## 2021-11-16 RX ADMIN — ALBUMIN HUMAN 250 ML: 0.05 INJECTION, SOLUTION INTRAVENOUS at 16:47

## 2021-11-16 RX ADMIN — KETAMINE HYDROCHLORIDE 15 MG: 10 INJECTION INTRAMUSCULAR; INTRAVENOUS at 07:56

## 2021-11-16 RX ADMIN — METOPROLOL TARTRATE 12.5 MG: 25 TABLET, FILM COATED ORAL at 05:59

## 2021-11-16 RX ADMIN — MIDAZOLAM 2 MG: 1 INJECTION INTRAMUSCULAR; INTRAVENOUS at 06:45

## 2021-11-16 RX ADMIN — KETAMINE HYDROCHLORIDE 5 MG: 10 INJECTION INTRAMUSCULAR; INTRAVENOUS at 09:12

## 2021-11-16 RX ADMIN — FENTANYL CITRATE 100 MCG: 0.05 INJECTION, SOLUTION INTRAMUSCULAR; INTRAVENOUS at 08:35

## 2021-11-16 RX ADMIN — NICARDIPINE HYDROCHLORIDE 5 MG/HR: 2.5 INJECTION, SOLUTION INTRAVENOUS at 09:10

## 2021-11-16 RX ADMIN — MIDAZOLAM 3 MG: 1 INJECTION INTRAMUSCULAR; INTRAVENOUS at 09:23

## 2021-11-16 RX ADMIN — HYDROMORPHONE HYDROCHLORIDE 1 MG: 2 INJECTION, SOLUTION INTRAMUSCULAR; INTRAVENOUS; SUBCUTANEOUS at 09:37

## 2021-11-16 RX ADMIN — ROCURONIUM BROMIDE 100 MG: 50 INJECTION INTRAVENOUS at 06:53

## 2021-11-16 RX ADMIN — SODIUM CHLORIDE 30 ML/HR: 9 INJECTION, SOLUTION INTRAVENOUS at 04:54

## 2021-11-16 RX ADMIN — KETAMINE HYDROCHLORIDE 10 MG: 10 INJECTION INTRAMUSCULAR; INTRAVENOUS at 09:46

## 2021-11-16 RX ADMIN — FENTANYL CITRATE 50 MCG: 0.05 INJECTION, SOLUTION INTRAMUSCULAR; INTRAVENOUS at 09:40

## 2021-11-16 RX ADMIN — KETAMINE HYDROCHLORIDE 10 MG: 10 INJECTION INTRAMUSCULAR; INTRAVENOUS at 10:12

## 2021-11-16 NOTE — OP NOTE
[facility] CARDIAC SURGERY OP NOTE    Preop Diagnosis: Severe coronary artery disease.  Uncontrolled diabetes.  Bilateral varicose veins.    Postop Diagnosis: Same    Indications: This patient had multivessel coronary disease with a tight proximal LAD and circumflex disease.  The right coronary artery was normal.  Operation was advisable to prolong life and relieve symptoms.The STS Risk and all risks and alternatives were discussed with the patient and family.  Counseling was done regarding abuse of tobacco, alcohol and drugs as needed. They understand and wish to proceed.    Procedure: CABG x4.  Skeletonized LIMA x2 to diagonal branch and then to mid LAD.  Vein graft to ramus intermedius.  Vein graft OM1.  Temporary cardiopulmonary bypass.  Antegrade cold blood cardioplegia.  Neurologic monitoring.  Transesophageal echo.  Endoscopic vein harvest of the right greater saphenous vein.  Left posterior pericardial window.    Surgeon: Lizandro Schwab MD    Assistant: Assistant: Giovanna Trinidad PA-C; Galo Lowe PA-C was responsible for performing the following activities: Cardiac Surgery First assist, Endoscopic Vein [place] if needed for CABG,  surgical wound closure and their skilled assistance was necessary for the success of this case.     Anesthesia: GET    Findings : Body mass index is 25.41 kg/m².  The heart and cardiac chambers were normal there was mild mitral incompetence.  The segment of vein in the right thigh was of good quality.  His hemoglobin A1c was 8 and I thought it would be risky to do a bilateral JAMES.  The vein worked well for his 2 vein grafts.  Ramus intermedius was 1.5 mm.  The marginal branch was 1.8 mm.  The diagonal branch was 2 mm in the LAD was 2 mm.  The LIMA was a 2.5 mm vessel with excellent blood flow.    Operative Procedure: A primary median sternotomy was made while the right greater saphenous vein was harvested with the endoscope.  The internal  mammary artery was dissected off the left chest wall with a harmonic scalpel.  Cardiopulmonary bypass was then established for 56 minutes drifting to 34 °C at appropriate flow rates.  The aorta was crossclamped for 44 minutes and we gave 1200 cc of antegrade cold blood cardioplegia and repeated doses every 10 to 15 minutes to good effect.  2 veins were anastomosed to the ascending aorta with 6-0 Prolene and marked with washers.  The veins were sewn to the ramus intermedius and OM1 with 7-0 Prolene.  The LIMA was sewn side to side to the diagonal branch and then to the LAD with 7-0 Prolene.  There was strong suction on the Arctic needle vent the cross-clamp was released.  The patient was rewarmed and cardiopulmonary bypass was weaned and discontinued.  Decannulation was effected and the usual devices were placed.  Hemostasis was obtained.  2 chest tubes were inserted and we applied vancomycin enriched platelet rich plasma.  The sternum was closed with stainless steel wires.  The fascia, soft tissues and skin were closed usual.  The graft lay nicely and all the anastomoses were hemostatic.  The sponge, needle and instrument counts were noted to be correct.    Complications: None     Tubes: 2    Epicardial Wires: 3    Blood Loss: Minimal. 300 mL L saver returned.    Bypass Time: 56 min    Aortic cross-clamp time: 44 min    Specimen: None    Condition: Good      Patient Care Team:  Jairo Curtis MD as PCP - General (Internal Medicine)        Lizandro Schwab MD  11/16/2021  10:08 EST

## 2021-11-16 NOTE — ANESTHESIA POSTPROCEDURE EVALUATION
"Patient: Thai Wu    Procedure Summary     Date: 11/16/21 Room / Location: [place] [address] / [facility] CARDIOVASCULAR OPERATING ROOM    Anesthesia Start: 0638 Anesthesia Stop: 1039    Procedure: INTRAOP RICKI, MIDLINE STERNOTOMY, CORONARY ARTERY BYPASS GRAFTING X UTILIZING LEFT JAMES AND ENDOSCOPICALLY HARVESTED RIGHT GREATER SAPHENOUS VEIN AND PRP. (N/A Chest) Diagnosis:       Coronary artery disease involving native coronary artery of native heart without angina pectoris      (Coronary artery disease involving native coronary artery of native heart without angina pectoris [I25.10])    Surgeons: Jr Lizandro Schwab MD Provider: Jarrett Stone MD    Anesthesia Type: general ASA Status: 4          Anesthesia Type: general    Vitals  Vitals Value Taken Time   /62 11/16/21 1101   Temp 35.6 °C (96.08 °F) 11/16/21 1116   Pulse 77 11/16/21 1116   Resp 18 11/16/21 1035   SpO2 98 % 11/16/21 1116   Vitals shown include unvalidated device data.        Post Anesthesia Care and Evaluation    Patient location during evaluation: bedside  Patient participation: complete - patient cannot participate  Post-procedure mental status: unable to assess...intubated and sedated.  Pain management: adequate  Anesthetic complications: No anesthetic complications    Cardiovascular status: acceptable  Respiratory status: ETT and intubated  Hydration status: stable    Comments: Patient unable to participate...intubated and sedated  /63 (BP Location: Right arm, Patient Position: Lying)   Pulse 79   Temp 35.5 °C (95.9 °F)   Resp 18   Ht 177.8 cm (70\")   Wt 80.3 kg (177 lb 2 oz)   SpO2 98%   BMI 25.41 kg/m²         "

## 2021-11-16 NOTE — ANESTHESIA PROCEDURE NOTES
Airway  Urgency: elective    Date/Time: 11/16/2021 6:56 AM  Airway not difficult    General Information and Staff    Patient location during procedure: OR  Anesthesiologist: Jarrett Stone MD    Indications and Patient Condition  Indications for airway management: airway protection    Preoxygenated: yes      Final Airway Details  Final airway type: endotracheal airway      Successful airway: ETT  Cuffed: yes   Successful intubation technique: direct laryngoscopy  Endotracheal tube insertion site: oral  Blade: Cathy  Blade size: 4  ETT size (mm): 8.0  Cormack-Lehane Classification: grade IIa - partial view of glottis  Placement verified by: chest auscultation and capnometry   Measured from: lips  ETT/EBT  to teeth (cm): 23  ETT/EBT  to lips (cm): 23  Number of attempts at approach: 1  Assessment: lips, teeth, and gum same as pre-op and atraumatic intubation

## 2021-11-16 NOTE — ANESTHESIA PROCEDURE NOTES
Right IJ Central Line      Patient reassessed immediately prior to procedure    Patient location during procedure: OR  Start time: 11/16/2021 7:03 AM  Stop Time:11/16/2021 7:11 AM  Indications: central pressure monitoring and vascular access  Staff  Anesthesiologist: Jarrett Stone MD  Preanesthetic Checklist  Completed: patient identified, IV checked, site marked, risks and benefits discussed, surgical consent, monitors and equipment checked, pre-op evaluation and timeout performed  Central Line Prep  Sterile Tech:cap, gloves, gown, mask and sterile barriers  Prep: chloraprep  Patient monitoring: blood pressure monitoring, continuous pulse oximetry and EKG  Central Line Procedure  Laterality:right  Location:internal jugular  Catheter Type:double lumen and MAC  Catheter Size:9 Fr  Guidance:ultrasound guided  PROCEDURE NOTE/ULTRASOUND INTERPRETATION.  Using ultrasound guidance the potential vascular sites for insertion of the catheter were visualized to determine the patency of the vessel to be used for vascular access.  After selecting the appropriate site for insertion, the needle was visualized under ultrasound being inserted into the internal jugular vein, followed by ultrasound confirmation of wire and catheter placement. There were no abnormalities seen on ultrasound; an image was taken; and the patient tolerated the procedure with no complications. Images: still images obtained, printed/placed on chart  Assessment  Post procedure:biopatch applied, line sutured and occlusive dressing applied  Assessement:blood return through all ports, free fluid flow and chest x-ray ordered  Complications:no  Patient Tolerance:patient tolerated the procedure well with no apparent complications

## 2021-11-16 NOTE — ANESTHESIA PROCEDURE NOTES
Right IJ PA Catheter      Patient reassessed immediately prior to procedure    Patient location during procedure: OR  Start time: 11/16/2021 7:13 AM  Stop Time:11/16/2021 7:15 AM  Indications: central pressure monitoring and MD/Surgeon request  Staff  Anesthesiologist: Jarrett Stone MD  Preanesthetic Checklist  Completed: patient identified, IV checked, risks and benefits discussed, surgical consent, monitors and equipment checked, pre-op evaluation and timeout performed  Central Line Prep  Sterile Tech:cap, gloves, gown, mask and sterile barriers  Prep: chloraprep  Patient monitoring: blood pressure monitoring, continuous pulse oximetry and EKG  Central Line Procedure  Laterality:right  Location:internal jugular  Catheter Type:[place]-Pj  Guidance:Waveform MonitoringImages: still images not obtained  Assessment  Assessement:blood return through all ports and free fluid flow  Complications:no  Patient Tolerance:patient tolerated the procedure well with no apparent complications

## 2021-11-16 NOTE — ANESTHESIA PROCEDURE NOTES
Procedure Performed: Emergent/Open-Heart Anesthesia RICKI     Start Time:        End Time:      Preanesthesia Checklist:  Patient identified, IV assessed, risks and benefits discussed, monitors and equipment assessed, procedure being performed at surgeon's request and anesthesia consent obtained.    General Procedure Information  Diagnostic Indications for Echo:  assessment of ascending aorta, assessment of surgical repair and hemodynamic monitoring  Physician Requesting Echo: Jr Lizandro Schwab MD  Location performed:  OR  Intubated  Bite block placed  Heart visualized  Probe Insertion:  Easy  Probe Type:  Biplane  Modalities:  2D only, color flow mapping, continuous wave Doppler and pulse wave Doppler    Echocardiographic and Doppler Measurements    Ventricles    Right Ventricle:  Cavity size normal.  Hypertrophy not present.  Thrombus not present.  Global function normal.    Left Ventricle:  Cavity size normal.  Hypertrophy not present.  Thrombus not present.  Global Function mildly impaired.  Ejection Fraction 50%.      Ventricular Regional Function:  2- Basal Anterior:  hypokinetic  5- Basal Inferior:  hypokinetic  8- Mid Anterior:  hypokinetic      Valves    Aortic Valve:  Annulus normal.  Stenosis not present.  Regurgitation trace.  Leaflets normal.      Mitral Valve:  Annulus normal.  Stenosis not present.  Regurgitation mild.  Leaflets normal.      Tricuspid Valve:  Annulus normal.  Stenosis not present.  Regurgitation mild.  Leaflets normal.    Pulmonic Valve:  Annulus normal.          Aorta    Ascending Aorta:  Size normal.  Dissection not present.  Plaque thickness less than 3 mm.    Aortic Arch:  Size normal.  Dissection not present.  Plaque thickness less than 3 mm.    Descending Aorta:  Size normal.  Dissection not present.  Plaque thickness less than 3 mm.          Atria    Right Atrium:  Size normal.  Thrombus not present.    Left Atrium:  Size normal.  Thrombus not present.  Left atrial appendage  normal.      Septa    Atrial Septum:  Intra-atrial septal morphology normal.      Ventricular Septum:  Intra-ventricular septum morphology normal.      Diastolic Function Measurements:  Diastolic Dysfunction Grade= I  E= 49.4 ms  A= 55.6 ms  E/A Ratio=  0.9  DT=  208 ms  S/D=   IVRT=    Other Findings  Pericardium:  normal  Pleural Effusion:  none  Pulmonary Arteries:  normal  Pulmonary Venous Flow:  normal    Anesthesia Information  Performed Personally  Anesthesiologist:  Jarrett Stone MD

## 2021-11-16 NOTE — ANESTHESIA PROCEDURE NOTES
Left Radial Arterial Line      Patient reassessed immediately prior to procedure    Patient location during procedure: OR  Start time: 11/16/2021 6:48 AM  Stop Time:11/16/2021 6:53 AM       Line placed for hemodynamic monitoring and ABGs/Labs/ISTAT.  Performed By   Anesthesiologist: Jarrett Stone MD  Preanesthetic Checklist  Completed: patient identified, IV checked, site marked, risks and benefits discussed, surgical consent, monitors and equipment checked, pre-op evaluation and timeout performed  Arterial Line Prep   Sterile Tech: gloves, mask, cap and sterile barriers  Prep: ChloraPrep  Patient monitoring: blood pressure monitoring, continuous pulse oximetry and EKG  Arterial Line Procedure   Laterality:left  Location:  radial artery  Catheter size: 20 G   Guidance: ultrasound guided and Ultrasound guidance was utlilized and needle placement was visualized under U/S  PROCEDURE NOTE/ULTRASOUND INTERPRETATION.  Using ultrasound guidance the potential vascular sites for insertion of the catheter were visualized to determine the patency of the vessel to be used for vascular access.  After selecting the appropriate site for insertion, the needle was visualized under ultrasound being inserted into the radial artery, followed by ultrasound confirmation of wire and catheter placement. There were no abnormalities seen on ultrasound; an image was taken; and the patient tolerated the procedure with no complications.   Number of attempts: 1  Successful placement: yes  Post Assessment   Dressing Type: occlusive dressing applied, secured with tape and wrist guard applied.   Complications no  Circ/Move/Sens Assessment: normal and unchanged.   Patient Tolerance: patient tolerated the procedure well with no apparent complications

## 2021-11-17 ENCOUNTER — APPOINTMENT (OUTPATIENT)
Dept: GENERAL RADIOLOGY | Facility: HOSPITAL | Age: 71
End: 2021-11-17

## 2021-11-17 LAB
ALBUMIN SERPL-MCNC: 4.6 G/DL (ref 3.5–5.2)
ANION GAP SERPL CALCULATED.3IONS-SCNC: 11.2 MMOL/L (ref 5–15)
BASOPHILS # BLD AUTO: 0.01 10*3/MM3 (ref 0–0.2)
BASOPHILS NFR BLD AUTO: 0.1 % (ref 0–1.5)
BUN SERPL-MCNC: 21 MG/DL (ref 8–23)
BUN/CREAT SERPL: 25 (ref 7–25)
CA-I BLD-MCNC: 5.2 MG/DL (ref 4.6–5.4)
CA-I SERPL ISE-MCNC: 1.29 MMOL/L (ref 1.15–1.35)
CALCIUM SPEC-SCNC: 8.6 MG/DL (ref 8.6–10.5)
CHLORIDE SERPL-SCNC: 109 MMOL/L (ref 98–107)
CO2 SERPL-SCNC: 20.8 MMOL/L (ref 22–29)
CREAT SERPL-MCNC: 0.84 MG/DL (ref 0.76–1.27)
DEPRECATED RDW RBC AUTO: 40.2 FL (ref 37–54)
EOSINOPHIL # BLD AUTO: 0 10*3/MM3 (ref 0–0.4)
EOSINOPHIL NFR BLD AUTO: 0 % (ref 0.3–6.2)
ERYTHROCYTE [DISTWIDTH] IN BLOOD BY AUTOMATED COUNT: 12.4 % (ref 12.3–15.4)
GFR SERPL CREATININE-BSD FRML MDRD: 90 ML/MIN/1.73
GLUCOSE BLDC GLUCOMTR-MCNC: 102 MG/DL (ref 70–130)
GLUCOSE BLDC GLUCOMTR-MCNC: 103 MG/DL (ref 70–130)
GLUCOSE BLDC GLUCOMTR-MCNC: 108 MG/DL (ref 70–130)
GLUCOSE BLDC GLUCOMTR-MCNC: 118 MG/DL (ref 70–130)
GLUCOSE BLDC GLUCOMTR-MCNC: 119 MG/DL (ref 70–130)
GLUCOSE BLDC GLUCOMTR-MCNC: 119 MG/DL (ref 70–130)
GLUCOSE BLDC GLUCOMTR-MCNC: 165 MG/DL (ref 70–130)
GLUCOSE BLDC GLUCOMTR-MCNC: 173 MG/DL (ref 70–130)
GLUCOSE BLDC GLUCOMTR-MCNC: 223 MG/DL (ref 70–130)
GLUCOSE BLDC GLUCOMTR-MCNC: 231 MG/DL (ref 70–130)
GLUCOSE SERPL-MCNC: 106 MG/DL (ref 65–99)
HCT VFR BLD AUTO: 31.8 % (ref 37.5–51)
HGB BLD-MCNC: 11.2 G/DL (ref 13–17.7)
IMM GRANULOCYTES # BLD AUTO: 0.08 10*3/MM3 (ref 0–0.05)
IMM GRANULOCYTES NFR BLD AUTO: 0.6 % (ref 0–0.5)
INR PPP: 1.26 (ref 0.9–1.1)
LYMPHOCYTES # BLD AUTO: 0.85 10*3/MM3 (ref 0.7–3.1)
LYMPHOCYTES NFR BLD AUTO: 6 % (ref 19.6–45.3)
MAGNESIUM SERPL-MCNC: 2.3 MG/DL (ref 1.6–2.4)
MCH RBC QN AUTO: 31.7 PG (ref 26.6–33)
MCHC RBC AUTO-ENTMCNC: 35.2 G/DL (ref 31.5–35.7)
MCV RBC AUTO: 90.1 FL (ref 79–97)
MONOCYTES # BLD AUTO: 1.84 10*3/MM3 (ref 0.1–0.9)
MONOCYTES NFR BLD AUTO: 13.1 % (ref 5–12)
NEUTROPHILS NFR BLD AUTO: 11.31 10*3/MM3 (ref 1.7–7)
NEUTROPHILS NFR BLD AUTO: 80.2 % (ref 42.7–76)
NRBC BLD AUTO-RTO: 0 /100 WBC (ref 0–0.2)
PHOSPHATE SERPL-MCNC: 4.1 MG/DL (ref 2.5–4.5)
PLATELET # BLD AUTO: 131 10*3/MM3 (ref 140–450)
PMV BLD AUTO: 9.9 FL (ref 6–12)
POTASSIUM SERPL-SCNC: 4.1 MMOL/L (ref 3.5–5.2)
PROTHROMBIN TIME: 15.6 SECONDS (ref 11.7–14.2)
QT INTERVAL: 359 MS
RBC # BLD AUTO: 3.53 10*6/MM3 (ref 4.14–5.8)
SODIUM SERPL-SCNC: 141 MMOL/L (ref 136–145)
WBC # BLD AUTO: 14.09 10*3/MM3 (ref 3.4–10.8)

## 2021-11-17 PROCEDURE — 83735 ASSAY OF MAGNESIUM: CPT | Performed by: NURSE PRACTITIONER

## 2021-11-17 PROCEDURE — 93010 ELECTROCARDIOGRAM REPORT: CPT | Performed by: INTERNAL MEDICINE

## 2021-11-17 PROCEDURE — 25010000002 MAGNESIUM SULFATE IN D5W 1G/100ML (PREMIX) 1-5 GM/100ML-% SOLUTION: Performed by: NURSE PRACTITIONER

## 2021-11-17 PROCEDURE — 97110 THERAPEUTIC EXERCISES: CPT

## 2021-11-17 PROCEDURE — 25010000002 METOCLOPRAMIDE PER 10 MG: Performed by: NURSE PRACTITIONER

## 2021-11-17 PROCEDURE — 25010000002 ENOXAPARIN PER 10 MG: Performed by: NURSE PRACTITIONER

## 2021-11-17 PROCEDURE — 71045 X-RAY EXAM CHEST 1 VIEW: CPT

## 2021-11-17 PROCEDURE — 80069 RENAL FUNCTION PANEL: CPT | Performed by: NURSE PRACTITIONER

## 2021-11-17 PROCEDURE — 63710000001 INSULIN LISPRO (HUMAN) PER 5 UNITS: Performed by: NURSE PRACTITIONER

## 2021-11-17 PROCEDURE — 85610 PROTHROMBIN TIME: CPT | Performed by: NURSE PRACTITIONER

## 2021-11-17 PROCEDURE — 93005 ELECTROCARDIOGRAM TRACING: CPT | Performed by: NURSE PRACTITIONER

## 2021-11-17 PROCEDURE — 82962 GLUCOSE BLOOD TEST: CPT

## 2021-11-17 PROCEDURE — 82330 ASSAY OF CALCIUM: CPT | Performed by: NURSE PRACTITIONER

## 2021-11-17 PROCEDURE — 97162 PT EVAL MOD COMPLEX 30 MIN: CPT

## 2021-11-17 PROCEDURE — 85025 COMPLETE CBC W/AUTO DIFF WBC: CPT | Performed by: NURSE PRACTITIONER

## 2021-11-17 PROCEDURE — 25010000002 ONDANSETRON PER 1 MG: Performed by: NURSE PRACTITIONER

## 2021-11-17 PROCEDURE — 0 CEFAZOLIN IN DEXTROSE 2-4 GM/100ML-% SOLUTION: Performed by: NURSE PRACTITIONER

## 2021-11-17 RX ORDER — NICOTINE POLACRILEX 4 MG
15 LOZENGE BUCCAL
Status: DISCONTINUED | OUTPATIENT
Start: 2021-11-17 | End: 2021-11-20 | Stop reason: HOSPADM

## 2021-11-17 RX ORDER — ATORVASTATIN CALCIUM 40 MG/1
40 TABLET, FILM COATED ORAL NIGHTLY
Status: CANCELLED | OUTPATIENT
Start: 2021-11-17

## 2021-11-17 RX ORDER — GUAIFENESIN 600 MG/1
1200 TABLET, EXTENDED RELEASE ORAL EVERY 12 HOURS SCHEDULED
Status: DISCONTINUED | OUTPATIENT
Start: 2021-11-17 | End: 2021-11-20 | Stop reason: HOSPADM

## 2021-11-17 RX ORDER — DEXTROSE MONOHYDRATE 25 G/50ML
25 INJECTION, SOLUTION INTRAVENOUS
Status: DISCONTINUED | OUTPATIENT
Start: 2021-11-17 | End: 2021-11-20 | Stop reason: HOSPADM

## 2021-11-17 RX ORDER — SCOLOPAMINE TRANSDERMAL SYSTEM 1 MG/1
1 PATCH, EXTENDED RELEASE TRANSDERMAL
Status: DISCONTINUED | OUTPATIENT
Start: 2021-11-17 | End: 2021-11-20 | Stop reason: HOSPADM

## 2021-11-17 RX ORDER — INSULIN LISPRO 100 [IU]/ML
0-9 INJECTION, SOLUTION INTRAVENOUS; SUBCUTANEOUS
Status: DISCONTINUED | OUTPATIENT
Start: 2021-11-17 | End: 2021-11-19

## 2021-11-17 RX ORDER — GABAPENTIN 100 MG/1
100 CAPSULE ORAL EVERY 12 HOURS SCHEDULED
Status: COMPLETED | OUTPATIENT
Start: 2021-11-17 | End: 2021-11-19

## 2021-11-17 RX ADMIN — CYCLOBENZAPRINE 10 MG: 10 TABLET, FILM COATED ORAL at 07:29

## 2021-11-17 RX ADMIN — METOCLOPRAMIDE HYDROCHLORIDE 10 MG: 5 INJECTION INTRAMUSCULAR; INTRAVENOUS at 05:32

## 2021-11-17 RX ADMIN — HYDROCODONE BITARTRATE AND ACETAMINOPHEN 2 TABLET: 5; 325 TABLET ORAL at 14:20

## 2021-11-17 RX ADMIN — PANTOPRAZOLE SODIUM 40 MG: 40 TABLET, DELAYED RELEASE ORAL at 05:32

## 2021-11-17 RX ADMIN — INSULIN LISPRO 4 UNITS: 100 INJECTION, SOLUTION INTRAVENOUS; SUBCUTANEOUS at 12:32

## 2021-11-17 RX ADMIN — HYDROCODONE BITARTRATE AND ACETAMINOPHEN 2 TABLET: 5; 325 TABLET ORAL at 18:26

## 2021-11-17 RX ADMIN — HYDROCODONE BITARTRATE AND ACETAMINOPHEN 2 TABLET: 5; 325 TABLET ORAL at 09:17

## 2021-11-17 RX ADMIN — ONDANSETRON 4 MG: 2 INJECTION INTRAMUSCULAR; INTRAVENOUS at 06:13

## 2021-11-17 RX ADMIN — ASPIRIN 81 MG: 81 TABLET, COATED ORAL at 08:14

## 2021-11-17 RX ADMIN — HYDROCODONE BITARTRATE AND ACETAMINOPHEN 2 TABLET: 5; 325 TABLET ORAL at 01:00

## 2021-11-17 RX ADMIN — CEFAZOLIN SODIUM 2 G: 2 INJECTION, SOLUTION INTRAVENOUS at 01:00

## 2021-11-17 RX ADMIN — CHLORHEXIDINE GLUCONATE 15 ML: 1.2 RINSE ORAL at 06:12

## 2021-11-17 RX ADMIN — CHLORHEXIDINE GLUCONATE 15 ML: 1.2 RINSE ORAL at 18:17

## 2021-11-17 RX ADMIN — MUPIROCIN 1 APPLICATION: 20 OINTMENT TOPICAL at 18:17

## 2021-11-17 RX ADMIN — INSULIN LISPRO 4 UNITS: 100 INJECTION, SOLUTION INTRAVENOUS; SUBCUTANEOUS at 21:35

## 2021-11-17 RX ADMIN — DOCUSATE SODIUM 50MG AND SENNOSIDES 8.6MG 2 TABLET: 8.6; 5 TABLET, FILM COATED ORAL at 21:35

## 2021-11-17 RX ADMIN — GUAIFENESIN 1200 MG: 600 TABLET, EXTENDED RELEASE ORAL at 21:35

## 2021-11-17 RX ADMIN — GUAIFENESIN 1200 MG: 600 TABLET, EXTENDED RELEASE ORAL at 09:24

## 2021-11-17 RX ADMIN — PANTOPRAZOLE SODIUM 40 MG: 40 TABLET, DELAYED RELEASE ORAL at 06:12

## 2021-11-17 RX ADMIN — INSULIN LISPRO 2 UNITS: 100 INJECTION, SOLUTION INTRAVENOUS; SUBCUTANEOUS at 09:24

## 2021-11-17 RX ADMIN — HYDROCODONE BITARTRATE AND ACETAMINOPHEN 2 TABLET: 5; 325 TABLET ORAL at 05:32

## 2021-11-17 RX ADMIN — MAGNESIUM SULFATE 1 G: 1 INJECTION INTRAVENOUS at 05:32

## 2021-11-17 RX ADMIN — METOPROLOL TARTRATE 12.5 MG: 25 TABLET, FILM COATED ORAL at 21:35

## 2021-11-17 RX ADMIN — CEFAZOLIN SODIUM 2 G: 2 INJECTION, SOLUTION INTRAVENOUS at 09:19

## 2021-11-17 RX ADMIN — GABAPENTIN 100 MG: 100 CAPSULE ORAL at 09:24

## 2021-11-17 RX ADMIN — MUPIROCIN 1 APPLICATION: 20 OINTMENT TOPICAL at 06:12

## 2021-11-17 RX ADMIN — SCOLOPAMINE TRANSDERMAL SYSTEM 1 PATCH: 1 PATCH, EXTENDED RELEASE TRANSDERMAL at 14:23

## 2021-11-17 RX ADMIN — GABAPENTIN 100 MG: 100 CAPSULE ORAL at 21:35

## 2021-11-17 RX ADMIN — INSULIN LISPRO 2 UNITS: 100 INJECTION, SOLUTION INTRAVENOUS; SUBCUTANEOUS at 18:17

## 2021-11-17 RX ADMIN — CEFAZOLIN SODIUM 2 G: 2 INJECTION, SOLUTION INTRAVENOUS at 18:17

## 2021-11-17 RX ADMIN — ATORVASTATIN CALCIUM 40 MG: 20 TABLET, FILM COATED ORAL at 21:35

## 2021-11-17 RX ADMIN — ENOXAPARIN SODIUM 40 MG: 40 INJECTION SUBCUTANEOUS at 18:17

## 2021-11-17 NOTE — THERAPY EVALUATION
Patient Name: Thai Wu  : 1950    MRN: [id number]                              Today's Date: 2021       Admit Date: 2021    Visit Dx:     ICD-10-CM ICD-9-CM   1. Coronary artery disease involving native coronary artery of native heart without angina pectoris  I25.10 414.01   2. S/P CABG (coronary artery bypass graft)  Z95.1 V45.81     Patient Active Problem List   Diagnosis   • Chest pain   • Non Q wave myocardial infarction (HCC)   • Coronary artery disease of native heart with stable angina pectoris (HCC)   • CAD (coronary artery disease)   • Essential hypertension   • Type 2 diabetes mellitus with circulatory disorder (HCC)   • Scleroderma (HCC)     Past Medical History:   Diagnosis Date   • Hypertension    • Scleroderma (HCC)    • Type 2 diabetes mellitus (HCC)      Past Surgical History:   Procedure Laterality Date   • CARDIAC CATHETERIZATION Left 2021    Procedure: Left Heart Cath;  Surgeon: Leroy Parker MD;  Location: [facility] CATH INVASIVE LOCATION;  Service: Cardiology;  Laterality: Left;   • CORONARY ARTERY BYPASS GRAFT N/A 2021    Procedure: INTRAOP RICKI, MIDLINE STERNOTOMY, CORONARY ARTERY BYPASS GRAFTING X UTILIZING LEFT JAMES AND ENDOSCOPICALLY HARVESTED RIGHT GREATER SAPHENOUS VEIN AND PRP.;  Surgeon: Jr Lizandro Schwab MD;  Location: [facility];  Service: Cardiothoracic;  Laterality: N/A;   • HERNIA REPAIR        General Information     Row Name 21          Physical Therapy Time and Intention    Document Type evaluation  -PC     Mode of Treatment physical therapy  -PC     Row Name 21          General Information    Patient Profile Reviewed yes  -PC     Prior Level of Function independent:  -PC     Existing Precautions/Restrictions fall; sternal; cardiac  -PC     Row Name 21          Living Environment    Lives With spouse  -PC     Row Name 21          Cognition    Orientation Status (Cognition) oriented x 3  -PC      Row Name 11/17/21 0927          Safety Issues, Functional Mobility    Impairments Affecting Function (Mobility) endurance/activity tolerance; strength; pain  -PC           User Key  (r) = Recorded By, (t) = Taken By, (c) = Cosigned By    Initials Name Provider Type    PC Chiara Roberson, PT Physical Therapist               Mobility     Row Name 11/17/21 0927          Bed Mobility    Comment (Bed Mobility) in chair  -PC     Row Name 11/17/21 0927          Transfers    Comment (Transfers) initial posterior lean  -PC     Row Name 11/17/21 0927          Sit-Stand Transfer    Sit-Stand [place] (Transfers) moderate assist (50% patient effort); 2 person assist  -PC     Row Name 11/17/21 0927          Gait/Stairs (Locomotion)    [place] Level (Gait) minimum assist (75% patient effort); 2 person assist  -PC     Distance in Feet (Gait) 50 ft  -PC     Deviations/Abnormal Patterns (Gait) gait speed decreased; stride length decreased; antalgic  -PC           User Key  (r) = Recorded By, (t) = Taken By, (c) = Cosigned By    Initials Name Provider Type    PC Chiara Roberson, PT Physical Therapist               Obj/Interventions     Row Name 11/17/21 0929          Range of Motion Comprehensive    General Range of Motion no range of motion deficits identified  -PC     Row Name 11/17/21 0929          Strength Comprehensive (MMT)    Comment, General Manual Muscle Testing (MMT) Assessment no focal deficits, general post op weakness  -PC     Row Name 11/17/21 0929          Motor Skills    Therapeutic Exercise --  5 reps cardiac protocol, level III  -     Row Name 11/17/21 0929          Balance    Balance Assessment sitting static balance; sitting dynamic balance; standing static balance; standing dynamic balance  -PC     Static Sitting Balance WFL  -PC     Dynamic Sitting Balance WFL  -PC     Static Standing Balance mild impairment  -PC     Dynamic Standing Balance mild impairment  -PC     Row Name 11/17/21 0929           Sensory Assessment (Somatosensory)    Sensory Assessment (Somatosensory) sensation intact  -PC           User Key  (r) = Recorded By, (t) = Taken By, (c) = Cosigned By    Initials Name Provider Type    PC Chiara Roberson PT Physical Therapist               Goals/Plan     Row Name 11/17/21 0933          Patient Education Goal (PT)    Activity (Patient Education Goal, PT) level V cardiac protocol  -PC     Time Frame (Patient Education Goal, PT) 1 week  -PC           User Key  (r) = Recorded By, (t) = Taken By, (c) = Cosigned By    Initials Name Provider Type    PC Chiara Roberson, PT Physical Therapist               Clinical Impression     Row Name 11/17/21 0929          Pain    Additional Documentation Pain Scale: Numbers Pre/Post-Treatment (Group)  -PC     Row Name 11/17/21 0929          Pain Scale: Numbers Pre/Post-Treatment    Pretreatment Pain Rating 2/10  -PC     Pain Location - Orientation incisional  -PC     Pain Location chest  -PC     Pain Intervention(s) Medication (See MAR); Repositioned  -PC     Row Name 11/17/21 0929          Plan of Care Review    Plan of Care Reviewed With patient  -PC     Outcome Summary pt is POD 1 CABG x 4, presenting with post op pain, weakness, impaired functional mobility and activity tolerance, he will benefit from PT to address, pt did fair this morning, able to walk 50 ft with assist, had unsteady gait, balance deficits and dizziness. Pt was independent prior to admission and lives with spouse, reports being active. Anticipate good progress and that pt will go home at discharge  -     Row Name 11/17/21 0929          Therapy Assessment/Plan (PT)    Rehab Potential (PT) good, to achieve stated therapy goals  -PC     Criteria for Skilled Interventions Met (PT) yes; meets criteria  -PC     Row Name 11/17/21 0929          Vital Signs    Pre Systolic BP Rehab 104  -PC     Pre Treatment Diastolic BP 58  -PC     Post Systolic BP Rehab 106  -PC     Post Treatment Diastolic BP 77   -PC     Pretreatment Heart Rate (beats/min) 93  -PC     Posttreatment Heart Rate (beats/min) 93  -PC     Pre SpO2 (%) 95  2L  -PC     O2 Delivery Pre Treatment supplemental O2  -PC     Post SpO2 (%) 95  -PC     O2 Delivery Post Treatment supplemental O2  -PC     Row Name 11/17/21 0929          Positioning and Restraints    Pre-Treatment Position sitting in chair/recliner  -PC     Post Treatment Position chair  -PC     In Chair reclined; call light within reach; encouraged to call for assist  -PC           User Key  (r) = Recorded By, (t) = Taken By, (c) = Cosigned By    Initials Name Provider Type    PC Chiara Roberson, PT Physical Therapist               Outcome Measures     Row Name 11/17/21 0933          How much help from another person do you currently need...    Turning from your back to your side while in flat bed without using bedrails? 3  -PC     Moving from lying on back to sitting on the side of a flat bed without bedrails? 3  -PC     Moving to and from a bed to a chair (including a wheelchair)? 3  -PC     Standing up from a chair using your arms (e.g., wheelchair, bedside chair)? 3  -PC     Climbing 3-5 steps with a railing? 2  -PC     To walk in hospital room? 3  -PC     AM-PAC 6 Clicks Score (PT) 17  -PC     Row Name 11/17/21 0933          Functional Assessment    Outcome Measure Options AM-PAC 6 Clicks Basic Mobility (PT)  -PC           User Key  (r) = Recorded By, (t) = Taken By, (c) = Cosigned By    Initials Name Provider Type    PC Chiara Roberson PT Physical Therapist                             Physical Therapy Education                 Title: PT OT SLP Therapies (Done)     Topic: Physical Therapy (Done)     Point: Mobility training (Done)     Learning Progress Summary           Patient Acceptance, E,D, DU by PC at 11/17/2021 0934                   Point: Home exercise program (Done)     Learning Progress Summary           Patient Acceptance, E,D, DU by  at 11/17/2021 0934                    Point: Body mechanics (Done)     Learning Progress Summary           Patient Acceptance, E,D, DU by PC at 11/17/2021 0934                   Point: Precautions (Done)     Learning Progress Summary           Patient Acceptance, E,D, DU by PC at 11/17/2021 0934                               User Key     Initials Effective Dates Name Provider Type Discipline     06/16/21 -  Chiara Roberson, PT Physical Therapist PT              PT Recommendation and Plan  Planned Therapy Interventions (PT): bed mobility training, gait training, transfer training, strengthening, stair training  Plan of Care Reviewed With: patient  Outcome Summary: pt is POD 1 CABG x 4, presenting with post op pain, weakness, impaired functional mobility and activity tolerance, he will benefit from PT to address, pt did fair this morning, able to walk 50 ft with assist, had unsteady gait, balance deficits and dizziness. Pt was independent prior to admission and lives with spouse, reports being active. Anticipate good progress and that pt will go home at discharge     Time Calculation:    PT Charges     Row Name 11/17/21 0935             Time Calculation    Start Time 0832  -PC      Stop Time 0850  -PC      Time Calculation (min) 18 min  -PC      PT Received On 11/17/21  -PC      PT - Next Appointment 11/18/21  -      PT Goal Re-Cert Due Date 11/24/21  -            User Key  (r) = Recorded By, (t) = Taken By, (c) = Cosigned By    Initials Name Provider Type    PC Chiara Roberson PT Physical Therapist              Therapy Charges for Today     Code Description Service Date Service Provider Modifiers Qty    07218581236 HC PT EVAL MOD COMPLEXITY 2 11/17/2021 Chiara Roberson, PT GP 1    [id number]  PT THER PROC EA 15 MIN 11/17/2021 Chiara Roberson, PT GP 1          PT G-Codes  Outcome Measure Options: AM-PAC 6 Clicks Basic Mobility (PT)  AM-PAC 6 Clicks Score (PT): 17    Chiara Roberson PT  11/17/2021

## 2021-11-17 NOTE — PROGRESS NOTES
LOS: 5 days   Patient Care Team:  Jairo Curtis MD as PCP - General (Internal Medicine)    Chief Complaint: post op    Subjective      Vital Signs  Temp:  [95.9 °F (35.5 °C)-99.5 °F (37.5 °C)] 99.5 °F (37.5 °C)  Heart Rate:  [] 81  Resp:  [13-18] 14  BP: ()/(57-74) 109/57  Arterial Line BP: ()/(46-58) 117/58  FiO2 (%):  [40 %-100 %] 40 %  Body mass index is 26.03 kg/m².    Intake/Output Summary (Last 24 hours) at 11/17/2021 0744  Last data filed at 11/17/2021 0700  Gross per 24 hour   Intake 3265 ml   Output 3995 ml   Net -730 ml     No intake/output data recorded.    Chest tube drainage last 8 hours 120        11/15/21  0436 11/15/21  2138 11/17/21  0500   Weight: 80 kg (176 lb 6.4 oz) 80.3 kg (177 lb 2 oz) 82.3 kg (181 lb 7 oz)         Objective    Results Review:        WBC WBC   Date Value Ref Range Status   11/17/2021 14.09 (H) 3.40 - 10.80 10*3/mm3 Final   11/16/2021 16.05 (H) 3.40 - 10.80 10*3/mm3 Final   11/16/2021 16.39 (H) 3.40 - 10.80 10*3/mm3 Final   11/15/2021 7.42 3.40 - 10.80 10*3/mm3 Final      HGB Hemoglobin   Date Value Ref Range Status   11/17/2021 11.2 (L) 13.0 - 17.7 g/dL Final   11/16/2021 12.8 (L) 13.0 - 17.7 g/dL Final   11/16/2021 13.1 13.0 - 17.7 g/dL Final   11/16/2021 9.9 (L) 12.0 - 17.0 g/dL Final   11/16/2021 11.2 (L) 12.0 - 17.0 g/dL Final   11/16/2021 11.2 (L) 12.0 - 17.0 g/dL Final   11/16/2021 10.5 (L) 12.0 - 17.0 g/dL Final   11/16/2021 12.6 12.0 - 17.0 g/dL Final   11/16/2021 12.9 12.0 - 17.0 g/dL Final   11/15/2021 15.6 13.0 - 17.7 g/dL Final      HCT Hematocrit   Date Value Ref Range Status   11/17/2021 31.8 (L) 37.5 - 51.0 % Final   11/16/2021 36.9 (L) 37.5 - 51.0 % Final   11/16/2021 37.1 (L) 37.5 - 51.0 % Final   11/16/2021 29 (L) 38 - 51 % Final   11/16/2021 33 (L) 38 - 51 % Final   11/16/2021 33 (L) 38 - 51 % Final   11/16/2021 31 (L) 38 - 51 % Final   11/16/2021 37 (L) 38 - 51 % Final   11/16/2021 38 38 - 51 % Final   11/15/2021 45.2 37.5 -  51.0 % Final      Platelets Platelets   Date Value Ref Range Status   11/17/2021 131 (L) 140 - 450 10*3/mm3 Final   11/16/2021 147 140 - 450 10*3/mm3 Final   11/16/2021 137 (L) 140 - 450 10*3/mm3 Final   11/15/2021 206 140 - 450 10*3/mm3 Final        PT/INR:    Protime   Date Value Ref Range Status   11/17/2021 15.6 (H) 11.7 - 14.2 Seconds Final   11/16/2021 16.1 (H) 11.7 - 14.2 Seconds Final   /  INR   Date Value Ref Range Status   11/17/2021 1.26 (H) 0.90 - 1.10 Final   11/16/2021 1.31 (H) 0.90 - 1.10 Final       Sodium Sodium   Date Value Ref Range Status   11/17/2021 141 136 - 145 mmol/L Final   11/16/2021 141 136 - 145 mmol/L Final   11/16/2021 139 136 - 145 mmol/L Final   11/16/2021 136 136 - 145 mmol/L Final   11/15/2021 140 136 - 145 mmol/L Final      Potassium Potassium   Date Value Ref Range Status   11/17/2021 4.1 3.5 - 5.2 mmol/L Final   11/16/2021 4.0 3.5 - 5.2 mmol/L Final   11/16/2021 4.2 3.5 - 5.2 mmol/L Final   11/16/2021 4.2 3.5 - 5.2 mmol/L Final   11/15/2021 4.3 3.5 - 5.2 mmol/L Final      Chloride Chloride   Date Value Ref Range Status   11/17/2021 109 (H) 98 - 107 mmol/L Final   11/16/2021 110 (H) 98 - 107 mmol/L Final   11/16/2021 109 (H) 98 - 107 mmol/L Final   11/16/2021 103 98 - 107 mmol/L Final   11/15/2021 102 98 - 107 mmol/L Final      Bicarbonate CO2   Date Value Ref Range Status   11/17/2021 20.8 (L) 22.0 - 29.0 mmol/L Final   11/16/2021 23.2 22.0 - 29.0 mmol/L Final   11/16/2021 21.6 (L) 22.0 - 29.0 mmol/L Final   11/16/2021 22.6 22.0 - 29.0 mmol/L Final   11/15/2021 29.4 (H) 22.0 - 29.0 mmol/L Final      BUN BUN   Date Value Ref Range Status   11/17/2021 21 8 - 23 mg/dL Final   11/16/2021 14 8 - 23 mg/dL Final   11/16/2021 14 8 - 23 mg/dL Final   11/16/2021 18 8 - 23 mg/dL Final   11/15/2021 18 8 - 23 mg/dL Final      Creatinine Creatinine   Date Value Ref Range Status   11/17/2021 0.84 0.76 - 1.27 mg/dL Final   11/16/2021 0.88 0.76 - 1.27 mg/dL Final   11/16/2021 0.95 0.76 - 1.27  mg/dL Final   11/16/2021 0.82 0.76 - 1.27 mg/dL Final   11/15/2021 0.98 0.76 - 1.27 mg/dL Final      Calcium Calcium   Date Value Ref Range Status   11/17/2021 8.6 8.6 - 10.5 mg/dL Final   11/16/2021 8.5 (L) 8.6 - 10.5 mg/dL Final   11/16/2021 8.7 8.6 - 10.5 mg/dL Final   11/16/2021 8.6 8.6 - 10.5 mg/dL Final   11/15/2021 9.0 8.6 - 10.5 mg/dL Final      Magnesium Magnesium   Date Value Ref Range Status   11/17/2021 2.3 1.6 - 2.4 mg/dL Final   11/16/2021 2.5 (H) 1.6 - 2.4 mg/dL Final   11/16/2021 3.0 (H) 1.6 - 2.4 mg/dL Final          aspirin, 81 mg, Oral, Daily  atorvastatin, 40 mg, Oral, Nightly  ceFAZolin, 2 g, Intravenous, Q8H  chlorhexidine, 15 mL, Mouth/Throat, Q12H  enoxaparin, 40 mg, Subcutaneous, Daily  magnesium sulfate, 1 g, Intravenous, Q8H  metoclopramide, 10 mg, Intravenous, Q6H  metoprolol tartrate, 12.5 mg, Oral, Q12H  mupirocin, , Each Nare, BID  pantoprazole, 40 mg, Intravenous, Once   Followed by  pantoprazole, 40 mg, Oral, QAM  senna-docusate sodium, 2 tablet, Oral, Nightly      clevidipine, 2-32 mg/hr  dexmedetomidine, 0.2-1.5 mcg/kg/hr, Last Rate: Stopped (11/16/21 1350)  DOPamine, 2-20 mcg/kg/min  EPINEPHrine, 0.02-0.2 mcg/kg/min  insulin, 0-50 Units/hr, Last Rate: Stopped (11/17/21 0500)  milrinone, 0.25-0.375 mcg/kg/min  niCARdipine, 5-15 mg/hr, Last Rate: Stopped (11/16/21 1153)  nitroglycerin, 5-200 mcg/min  norepinephrine, 0.02-0.3 mcg/kg/min  phenylephrine, 0.2-2 mcg/kg/min, Last Rate: Stopped (11/16/21 1648)  propofol, 5-50 mcg/kg/min, Last Rate: Stopped (11/16/21 1200)  sodium chloride, 30 mL/hr, Last Rate: 30 mL/hr (11/16/21 1110)  sodium chloride, 30 mL/hr, Last Rate: 30 mL/hr (11/16/21 1109)            Patient Active Problem List   Diagnosis Code   • Chest pain R07.9   • Non Q wave myocardial infarction (HCC) I21.4   • Coronary artery disease of native heart with stable angina pectoris (HCC) I25.118   • CAD (coronary artery disease) I25.10   • Essential hypertension I10   • Type 2  diabetes mellitus with circulatory disorder ([facility]) E11.59   • Scleroderma ([facility]) M34.9       Assessment & Plan    -NSTEMI, Severe multivessel CAD-s/p CABGx4 LIMA/rsvg--POD#1 [place]  -Diabetes type 2  -scleroderma-on plaquenil   -leukocytosis-- reactive  -Post op anemia- expected acute blood loss    Looks good this morning  2L NC--wean as able  Complaints of nausea this morning-- will add scopolamine patch  Encourage pulmonary toilet  Mobilize   Transfer to stepdown      Jodrana Rosa, CLAUDIA  11/17/21  07:44 EST     No

## 2021-11-17 NOTE — PLAN OF CARE
Goal Outcome Evaluation:  Plan of Care Reviewed With: patient           Outcome Summary: pt is POD 1 CABG x 4, presenting with post op pain, weakness, impaired functional mobility and activity tolerance, he will benefit from PT to address, pt did fair this morning, able to walk 50 ft with assist, had unsteady gait, balance deficits and dizziness. Pt was independent prior to admission and lives with spouse, reports being active. Anticipate good progress and that pt will go home at discharge  Patient was intermittently wearing a face mask during this therapy encounter. Therapist used appropriate personal protective equipment including eye protection, mask, and gloves.  Mask used was standard procedure mask. Appropriate PPE was worn during the entire therapy session. Hand hygiene was completed before and after therapy session. Patient is not in enhanced droplet precautions.

## 2021-11-17 NOTE — PROGRESS NOTES
Name: Thai Wu ADMIT: 2021   : 1950  PCP: Jairo Curtis MD    MRN: [id number] LOS: 5 days   AGE/SEX: 71 y.o. male  ROOM:      Subjective   Subjective   He complains of expected chest soreness. Otherwise doing well    Review of Systems     Objective   Objective   Vital Signs  Temp:  [98.06 °F (36.7 °C)-100.2 °F (37.9 °C)] 98.2 °F (36.8 °C)  Heart Rate:  [] 87  Resp:  [14-18] 18  BP: ()/(50-77) 106/61  Arterial Line BP: ()/(52-58) 117/58  SpO2:  [93 %-97 %] 96 %  on  Flow (L/min):  [2-5] 2;   Device (Oxygen Therapy): nasal cannula  Body mass index is 26.03 kg/m².  Physical Exam  Vitals and nursing note reviewed.   Constitutional:       General: He is not in acute distress.     Appearance: He is well-developed.   HENT:      Head: Normocephalic and atraumatic.   Eyes:      General: No scleral icterus.  Cardiovascular:      Rate and Rhythm: Normal rate and regular rhythm.      Heart sounds: No murmur heard.      Pulmonary:      Effort: Pulmonary effort is normal. No respiratory distress.   Musculoskeletal:      Right lower leg: No edema.      Left lower leg: No edema.   Skin:     General: Skin is warm and dry.      Findings: No rash.   Neurological:      Mental Status: He is alert and oriented to person, place, and time.         Results Review     I reviewed the patient's new clinical results.  Results from last 7 days   Lab Units 21  0401 21  1446 21  1040 21  0945 21  0725 11/15/21  1004   WBC 10*3/mm3 14.09* 16.05* 16.39*  --   --  7.42   HEMOGLOBIN g/dL 11.2* 12.8* 13.1  --   --  15.6   HEMOGLOBIN, POC g/dL  --   --   --  9.9*   < >  --    PLATELETS 10*3/mm3 131* 147 137*  --   --  206    < > = values in this interval not displayed.     Results from last 7 days   Lab Units 21  0401 21  1446 21  1040 21  0317   SODIUM mmol/L 141 141 139 136   POTASSIUM mmol/L 4.1 4.0 4.2 4.2   CHLORIDE mmol/L 109*  110* 109* 103   CO2 mmol/L 20.8* 23.2 21.6* 22.6   BUN mg/dL 21 14 14 18   CREATININE mg/dL 0.84 0.88 0.95 0.82   GLUCOSE mg/dL 106* 129* 155* 247*   EGFR IF NONAFRICN AM mL/min/1.73 90 85 78 92     Results from last 7 days   Lab Units 11/17/21  0401 11/16/21  1446 11/16/21  1040 11/15/21  1004 11/12/21 2119 11/12/21 2119 11/11/21 0626 11/11/21 0626   ALBUMIN g/dL 4.60 5.00 4.30 4.00   < > 4.80   < > 4.10   BILIRUBIN mg/dL  --   --   --  0.3  --  0.4  --  0.4   ALK PHOS U/L  --   --   --  53  --  49  --  42   AST (SGOT) U/L  --   --   --  29  --  18  --  15   ALT (SGPT) U/L  --   --   --  47*  --  21  --  15    < > = values in this interval not displayed.     Results from last 7 days   Lab Units 11/17/21  0401 11/16/21  1446 11/16/21  1040 11/16/21  0317 11/15/21  1004 11/15/21  1004 11/12/21 2119 11/12/21 2119   CALCIUM mg/dL 8.6 8.5* 8.7 8.6   < > 9.0   < > 9.4   ALBUMIN g/dL 4.60 5.00 4.30  --   --  4.00   < > 4.80   MAGNESIUM mg/dL 2.3 2.5* 3.0*  --   --   --   --  2.3   PHOSPHORUS mg/dL 4.1 2.7 1.8*  --   --   --   --   --     < > = values in this interval not displayed.       COVID19   Date Value Ref Range Status   11/15/2021 Not Detected Not Detected - Ref. Range Final   11/13/2021 Not Detected Not Detected - Ref. Range Final     Glucose   Date/Time Value Ref Range Status   11/17/2021 1607 173 (H) 70 - 130 mg/dL Final     Comment:     Meter: [id number] : [id number] Christjose Chambers    11/17/2021 1138 223 (H) 70 - 130 mg/dL Final     Comment:     Meter: [id number] : [id number] Farshadgeorges Chambers    11/17/2021 0735 165 (H) 70 - 130 mg/dL Final     Comment:     Meter: [id number] : [id number] Esthela MIRANDA   11/17/2021 0506 108 70 - 130 mg/dL Final     Comment:     Meter: [id number] : IVY Mir    11/17/2021 0354 102 70 - 130 mg/dL Final     Comment:     Meter: [id number] : IVY Mir    11/17/2021 0303 103 70 - 130 mg/dL Final     Comment:      Meter: [id number] : IVY Mir RN   11/17/2021 0156 119 70 - 130 mg/dL Final     Comment:     Meter: [id number] : IVY Mir RN       XR Chest 1 View  Narrative: CHEST SINGLE VIEW     HISTORY: Postop exam. Follow-up CABG.     COMPARISON: AP chest 11/16/2021.     FINDINGS: There has been interval extubation. Right IJ central venous  catheter sheath is present. Right IJ PAC has been withdrawn. Sternotomy  wires are present. There is basilar atelectasis. There are small pleural  effusions. Chest drains and epicardial pacing leads are noted. No  definite pneumothorax.     Impression: Interval extubation and withdrawal of right IJ PAC without  other significant change. Note definite pneumothorax.     This report was finalized on 11/17/2021 7:24 AM by Dr. Jose Shrestha M.D.       Scheduled Medications  aspirin, 81 mg, Oral, Daily  atorvastatin, 40 mg, Oral, Nightly  ceFAZolin, 2 g, Intravenous, Q8H  chlorhexidine, 15 mL, Mouth/Throat, Q12H  enoxaparin, 40 mg, Subcutaneous, Daily  gabapentin, 100 mg, Oral, Q12H  guaiFENesin, 1,200 mg, Oral, Q12H  insulin lispro, 0-9 Units, Subcutaneous, 4x Daily With Meals & Nightly  metoprolol tartrate, 12.5 mg, Oral, Q12H  mupirocin, , Each Nare, BID  pantoprazole, 40 mg, Intravenous, Once   Followed by  pantoprazole, 40 mg, Oral, QAM  Scopolamine, 1 patch, Transdermal, Q72H  senna-docusate sodium, 2 tablet, Oral, Nightly    Infusions  clevidipine, 2-32 mg/hr  sodium chloride, 30 mL/hr, Last Rate: 30 mL/hr (11/16/21 1110)  sodium chloride, 30 mL/hr, Last Rate: 30 mL/hr (11/16/21 1109)    Diet  NPO Diet       Assessment/Plan     Active Hospital Problems    Diagnosis  POA   • Essential hypertension [I10]  Unknown   • Type 2 diabetes mellitus with circulatory disorder (HCC) [E11.59]  Unknown   • Scleroderma (HCC) [M34.9]  Unknown   • CAD (coronary artery disease) [I25.10]  Yes      Resolved Hospital Problems   No resolved problems to display.        71 y.o. male with history of diabetes and hypertension, transferred here with multivessel CAD and plans for CABG.    · DM2: Creeping back up today now that he is back on a diet. Continue sliding scale insulin and will restart Januvia (Tradjenta will be substituted in house) tomorrow.   · Status post CABG: Continue low-dose Lopressor as tolerated although BP is somewhat soft. On aspirin/Lipitor as well.   · Leukocytosis presumably reactive. We will continue to monitor    · Discussed with patient and wife at bedside  · Lovenox for DVT prophylaxis.    · Mobilize as tolerated      Awasi Lamar MD  [facility]  11/17/21  16:23 EST

## 2021-11-18 ENCOUNTER — APPOINTMENT (OUTPATIENT)
Dept: GENERAL RADIOLOGY | Facility: HOSPITAL | Age: 71
End: 2021-11-18

## 2021-11-18 LAB
ANION GAP SERPL CALCULATED.3IONS-SCNC: 12.5 MMOL/L (ref 5–15)
BUN SERPL-MCNC: 20 MG/DL (ref 8–23)
BUN/CREAT SERPL: 23 (ref 7–25)
CALCIUM SPEC-SCNC: 8.5 MG/DL (ref 8.6–10.5)
CHLORIDE SERPL-SCNC: 104 MMOL/L (ref 98–107)
CO2 SERPL-SCNC: 20.5 MMOL/L (ref 22–29)
CREAT SERPL-MCNC: 0.87 MG/DL (ref 0.76–1.27)
DEPRECATED RDW RBC AUTO: 43.6 FL (ref 37–54)
ERYTHROCYTE [DISTWIDTH] IN BLOOD BY AUTOMATED COUNT: 12.3 % (ref 12.3–15.4)
GFR SERPL CREATININE-BSD FRML MDRD: 86 ML/MIN/1.73
GLUCOSE BLDC GLUCOMTR-MCNC: 225 MG/DL (ref 70–130)
GLUCOSE BLDC GLUCOMTR-MCNC: 244 MG/DL (ref 70–130)
GLUCOSE BLDC GLUCOMTR-MCNC: 248 MG/DL (ref 70–130)
GLUCOSE BLDC GLUCOMTR-MCNC: 299 MG/DL (ref 70–130)
GLUCOSE SERPL-MCNC: 235 MG/DL (ref 65–99)
HCT VFR BLD AUTO: 34.8 % (ref 37.5–51)
HGB BLD-MCNC: 11.6 G/DL (ref 13–17.7)
MCH RBC QN AUTO: 32 PG (ref 26.6–33)
MCHC RBC AUTO-ENTMCNC: 33.3 G/DL (ref 31.5–35.7)
MCV RBC AUTO: 95.9 FL (ref 79–97)
PLATELET # BLD AUTO: 139 10*3/MM3 (ref 140–450)
PMV BLD AUTO: 10.3 FL (ref 6–12)
POTASSIUM SERPL-SCNC: 4.6 MMOL/L (ref 3.5–5.2)
QT INTERVAL: 340 MS
RBC # BLD AUTO: 3.63 10*6/MM3 (ref 4.14–5.8)
SODIUM SERPL-SCNC: 137 MMOL/L (ref 136–145)
WBC NRBC COR # BLD: 13.78 10*3/MM3 (ref 3.4–10.8)

## 2021-11-18 PROCEDURE — 0 CEFAZOLIN IN DEXTROSE 2-4 GM/100ML-% SOLUTION: Performed by: NURSE PRACTITIONER

## 2021-11-18 PROCEDURE — 93010 ELECTROCARDIOGRAM REPORT: CPT | Performed by: INTERNAL MEDICINE

## 2021-11-18 PROCEDURE — 25010000002 CALCIUM GLUCONATE-NACL 1-0.675 GM/50ML-% SOLUTION: Performed by: NURSE PRACTITIONER

## 2021-11-18 PROCEDURE — 25010000002 ENOXAPARIN PER 10 MG: Performed by: NURSE PRACTITIONER

## 2021-11-18 PROCEDURE — 97530 THERAPEUTIC ACTIVITIES: CPT

## 2021-11-18 PROCEDURE — 80048 BASIC METABOLIC PNL TOTAL CA: CPT | Performed by: NURSE PRACTITIONER

## 2021-11-18 PROCEDURE — 63710000001 INSULIN LISPRO (HUMAN) PER 5 UNITS: Performed by: NURSE PRACTITIONER

## 2021-11-18 PROCEDURE — 25010000002 METOCLOPRAMIDE PER 10 MG: Performed by: NURSE PRACTITIONER

## 2021-11-18 PROCEDURE — 93005 ELECTROCARDIOGRAM TRACING: CPT | Performed by: NURSE PRACTITIONER

## 2021-11-18 PROCEDURE — 25010000002 ONDANSETRON PER 1 MG: Performed by: NURSE PRACTITIONER

## 2021-11-18 PROCEDURE — 71045 X-RAY EXAM CHEST 1 VIEW: CPT

## 2021-11-18 PROCEDURE — 82962 GLUCOSE BLOOD TEST: CPT

## 2021-11-18 PROCEDURE — 85027 COMPLETE CBC AUTOMATED: CPT | Performed by: NURSE PRACTITIONER

## 2021-11-18 RX ORDER — CALCIUM GLUCONATE 20 MG/ML
2 INJECTION, SOLUTION INTRAVENOUS ONCE
Status: COMPLETED | OUTPATIENT
Start: 2021-11-18 | End: 2021-11-18

## 2021-11-18 RX ORDER — METOCLOPRAMIDE HYDROCHLORIDE 5 MG/ML
5 INJECTION INTRAMUSCULAR; INTRAVENOUS EVERY 6 HOURS
Status: COMPLETED | OUTPATIENT
Start: 2021-11-18 | End: 2021-11-19

## 2021-11-18 RX ADMIN — METOCLOPRAMIDE HYDROCHLORIDE 5 MG: 5 INJECTION INTRAMUSCULAR; INTRAVENOUS at 16:30

## 2021-11-18 RX ADMIN — METOCLOPRAMIDE HYDROCHLORIDE 5 MG: 5 INJECTION INTRAMUSCULAR; INTRAVENOUS at 21:22

## 2021-11-18 RX ADMIN — HYDROCODONE BITARTRATE AND ACETAMINOPHEN 2 TABLET: 5; 325 TABLET ORAL at 03:36

## 2021-11-18 RX ADMIN — MUPIROCIN 1 APPLICATION: 20 OINTMENT TOPICAL at 17:28

## 2021-11-18 RX ADMIN — CHLORHEXIDINE GLUCONATE 15 ML: 1.2 RINSE ORAL at 03:35

## 2021-11-18 RX ADMIN — INSULIN LISPRO 4 UNITS: 100 INJECTION, SOLUTION INTRAVENOUS; SUBCUTANEOUS at 06:53

## 2021-11-18 RX ADMIN — ONDANSETRON 4 MG: 2 INJECTION INTRAMUSCULAR; INTRAVENOUS at 03:36

## 2021-11-18 RX ADMIN — GUAIFENESIN 1200 MG: 600 TABLET, EXTENDED RELEASE ORAL at 21:23

## 2021-11-18 RX ADMIN — CEFAZOLIN SODIUM 2 G: 2 INJECTION, SOLUTION INTRAVENOUS at 02:25

## 2021-11-18 RX ADMIN — INSULIN LISPRO 4 UNITS: 100 INJECTION, SOLUTION INTRAVENOUS; SUBCUTANEOUS at 11:35

## 2021-11-18 RX ADMIN — METOPROLOL TARTRATE 12.5 MG: 25 TABLET, FILM COATED ORAL at 08:47

## 2021-11-18 RX ADMIN — ENOXAPARIN SODIUM 40 MG: 40 INJECTION SUBCUTANEOUS at 08:46

## 2021-11-18 RX ADMIN — INSULIN LISPRO 6 UNITS: 100 INJECTION, SOLUTION INTRAVENOUS; SUBCUTANEOUS at 17:28

## 2021-11-18 RX ADMIN — ATORVASTATIN CALCIUM 40 MG: 20 TABLET, FILM COATED ORAL at 21:23

## 2021-11-18 RX ADMIN — GUAIFENESIN 1200 MG: 600 TABLET, EXTENDED RELEASE ORAL at 08:47

## 2021-11-18 RX ADMIN — GABAPENTIN 100 MG: 100 CAPSULE ORAL at 21:23

## 2021-11-18 RX ADMIN — CHLORHEXIDINE GLUCONATE 15 ML: 1.2 RINSE ORAL at 17:28

## 2021-11-18 RX ADMIN — GABAPENTIN 100 MG: 100 CAPSULE ORAL at 08:47

## 2021-11-18 RX ADMIN — PANTOPRAZOLE SODIUM 40 MG: 40 TABLET, DELAYED RELEASE ORAL at 03:37

## 2021-11-18 RX ADMIN — METOPROLOL TARTRATE 25 MG: 25 TABLET, FILM COATED ORAL at 21:23

## 2021-11-18 RX ADMIN — MUPIROCIN 1 APPLICATION: 20 OINTMENT TOPICAL at 03:36

## 2021-11-18 RX ADMIN — METOCLOPRAMIDE HYDROCHLORIDE 5 MG: 5 INJECTION INTRAMUSCULAR; INTRAVENOUS at 11:15

## 2021-11-18 RX ADMIN — INSULIN LISPRO 4 UNITS: 100 INJECTION, SOLUTION INTRAVENOUS; SUBCUTANEOUS at 21:22

## 2021-11-18 RX ADMIN — DOCUSATE SODIUM 50MG AND SENNOSIDES 8.6MG 2 TABLET: 8.6; 5 TABLET, FILM COATED ORAL at 21:23

## 2021-11-18 RX ADMIN — CALCIUM GLUCONATE 2 G: 20 INJECTION, SOLUTION INTRAVENOUS at 11:16

## 2021-11-18 RX ADMIN — HYDROCODONE BITARTRATE AND ACETAMINOPHEN 2 TABLET: 5; 325 TABLET ORAL at 21:38

## 2021-11-18 RX ADMIN — ASPIRIN 81 MG: 81 TABLET, COATED ORAL at 08:47

## 2021-11-18 RX ADMIN — LINAGLIPTIN 5 MG: 5 TABLET, FILM COATED ORAL at 08:47

## 2021-11-18 NOTE — PROGRESS NOTES
LOS: 6 days   Patient Care Team:  Jairo Curtis MD as PCP - General (Internal Medicine)    Chief Complaint:postop  Subjective      Vital Signs  Temp:  [97.5 °F (36.4 °C)-98.3 °F (36.8 °C)] 97.5 °F (36.4 °C)  Heart Rate:  [] 106  Resp:  [18] 18  BP: ()/(59-84) 120/67  Body mass index is 25.8 kg/m².    Intake/Output Summary (Last 24 hours) at 11/18/2021 0912  Last data filed at 11/18/2021 0825  Gross per 24 hour   Intake 665 ml   Output 1315 ml   Net -650 ml     I/O this shift:  In: 300 [P.O.:300]  Out: -     Chest tube drainage last 8 hours 90        11/15/21  2138 11/17/21  0500 11/18/21  0706   Weight: 80.3 kg (177 lb 2 oz) 82.3 kg (181 lb 7 oz) 81.6 kg (179 lb 12.8 oz)         Objective    Results Review:        WBC WBC   Date Value Ref Range Status   11/18/2021 13.78 (H) 3.40 - 10.80 10*3/mm3 Final   11/17/2021 14.09 (H) 3.40 - 10.80 10*3/mm3 Final   11/16/2021 16.05 (H) 3.40 - 10.80 10*3/mm3 Final   11/16/2021 16.39 (H) 3.40 - 10.80 10*3/mm3 Final   11/15/2021 7.42 3.40 - 10.80 10*3/mm3 Final      HGB Hemoglobin   Date Value Ref Range Status   11/18/2021 11.6 (L) 13.0 - 17.7 g/dL Final   11/17/2021 11.2 (L) 13.0 - 17.7 g/dL Final   11/16/2021 12.8 (L) 13.0 - 17.7 g/dL Final   11/16/2021 13.1 13.0 - 17.7 g/dL Final   11/16/2021 9.9 (L) 12.0 - 17.0 g/dL Final   11/16/2021 11.2 (L) 12.0 - 17.0 g/dL Final   11/16/2021 11.2 (L) 12.0 - 17.0 g/dL Final   11/16/2021 10.5 (L) 12.0 - 17.0 g/dL Final   11/16/2021 12.6 12.0 - 17.0 g/dL Final   11/16/2021 12.9 12.0 - 17.0 g/dL Final   11/15/2021 15.6 13.0 - 17.7 g/dL Final      HCT Hematocrit   Date Value Ref Range Status   11/18/2021 34.8 (L) 37.5 - 51.0 % Final   11/17/2021 31.8 (L) 37.5 - 51.0 % Final   11/16/2021 36.9 (L) 37.5 - 51.0 % Final   11/16/2021 37.1 (L) 37.5 - 51.0 % Final   11/16/2021 29 (L) 38 - 51 % Final   11/16/2021 33 (L) 38 - 51 % Final   11/16/2021 33 (L) 38 - 51 % Final   11/16/2021 31 (L) 38 - 51 % Final   11/16/2021 37 (L)  38 - 51 % Final   11/16/2021 38 38 - 51 % Final   11/15/2021 45.2 37.5 - 51.0 % Final      Platelets Platelets   Date Value Ref Range Status   11/18/2021 139 (L) 140 - 450 10*3/mm3 Final   11/17/2021 131 (L) 140 - 450 10*3/mm3 Final   11/16/2021 147 140 - 450 10*3/mm3 Final   11/16/2021 137 (L) 140 - 450 10*3/mm3 Final   11/15/2021 206 140 - 450 10*3/mm3 Final        PT/INR:    Protime   Date Value Ref Range Status   11/17/2021 15.6 (H) 11.7 - 14.2 Seconds Final   11/16/2021 16.1 (H) 11.7 - 14.2 Seconds Final   /  INR   Date Value Ref Range Status   11/17/2021 1.26 (H) 0.90 - 1.10 Final   11/16/2021 1.31 (H) 0.90 - 1.10 Final       Sodium Sodium   Date Value Ref Range Status   11/18/2021 137 136 - 145 mmol/L Final   11/17/2021 141 136 - 145 mmol/L Final   11/16/2021 141 136 - 145 mmol/L Final   11/16/2021 139 136 - 145 mmol/L Final   11/16/2021 136 136 - 145 mmol/L Final   11/15/2021 140 136 - 145 mmol/L Final      Potassium Potassium   Date Value Ref Range Status   11/18/2021 4.6 3.5 - 5.2 mmol/L Final   11/17/2021 4.1 3.5 - 5.2 mmol/L Final   11/16/2021 4.0 3.5 - 5.2 mmol/L Final   11/16/2021 4.2 3.5 - 5.2 mmol/L Final   11/16/2021 4.2 3.5 - 5.2 mmol/L Final   11/15/2021 4.3 3.5 - 5.2 mmol/L Final      Chloride Chloride   Date Value Ref Range Status   11/18/2021 104 98 - 107 mmol/L Final   11/17/2021 109 (H) 98 - 107 mmol/L Final   11/16/2021 110 (H) 98 - 107 mmol/L Final   11/16/2021 109 (H) 98 - 107 mmol/L Final   11/16/2021 103 98 - 107 mmol/L Final   11/15/2021 102 98 - 107 mmol/L Final      Bicarbonate CO2   Date Value Ref Range Status   11/18/2021 20.5 (L) 22.0 - 29.0 mmol/L Final   11/17/2021 20.8 (L) 22.0 - 29.0 mmol/L Final   11/16/2021 23.2 22.0 - 29.0 mmol/L Final   11/16/2021 21.6 (L) 22.0 - 29.0 mmol/L Final   11/16/2021 22.6 22.0 - 29.0 mmol/L Final   11/15/2021 29.4 (H) 22.0 - 29.0 mmol/L Final      BUN BUN   Date Value Ref Range Status   11/18/2021 20 8 - 23 mg/dL Final   11/17/2021 21 8 - 23 mg/dL  Final   11/16/2021 14 8 - 23 mg/dL Final   11/16/2021 14 8 - 23 mg/dL Final   11/16/2021 18 8 - 23 mg/dL Final   11/15/2021 18 8 - 23 mg/dL Final      Creatinine Creatinine   Date Value Ref Range Status   11/18/2021 0.87 0.76 - 1.27 mg/dL Final   11/17/2021 0.84 0.76 - 1.27 mg/dL Final   11/16/2021 0.88 0.76 - 1.27 mg/dL Final   11/16/2021 0.95 0.76 - 1.27 mg/dL Final   11/16/2021 0.82 0.76 - 1.27 mg/dL Final   11/15/2021 0.98 0.76 - 1.27 mg/dL Final      Calcium Calcium   Date Value Ref Range Status   11/18/2021 8.5 (L) 8.6 - 10.5 mg/dL Final   11/17/2021 8.6 8.6 - 10.5 mg/dL Final   11/16/2021 8.5 (L) 8.6 - 10.5 mg/dL Final   11/16/2021 8.7 8.6 - 10.5 mg/dL Final   11/16/2021 8.6 8.6 - 10.5 mg/dL Final   11/15/2021 9.0 8.6 - 10.5 mg/dL Final      Magnesium Magnesium   Date Value Ref Range Status   11/17/2021 2.3 1.6 - 2.4 mg/dL Final   11/16/2021 2.5 (H) 1.6 - 2.4 mg/dL Final   11/16/2021 3.0 (H) 1.6 - 2.4 mg/dL Final          aspirin, 81 mg, Oral, Daily  atorvastatin, 40 mg, Oral, Nightly  chlorhexidine, 15 mL, Mouth/Throat, Q12H  enoxaparin, 40 mg, Subcutaneous, Daily  gabapentin, 100 mg, Oral, Q12H  guaiFENesin, 1,200 mg, Oral, Q12H  insulin lispro, 0-9 Units, Subcutaneous, 4x Daily With Meals & Nightly  linagliptin, 5 mg, Oral, Daily  metoprolol tartrate, 12.5 mg, Oral, Q12H  mupirocin, , Each Nare, BID  pantoprazole, 40 mg, Intravenous, Once   Followed by  pantoprazole, 40 mg, Oral, QAM  Scopolamine, 1 patch, Transdermal, Q72H  senna-docusate sodium, 2 tablet, Oral, Nightly      clevidipine, 2-32 mg/hr  sodium chloride, 30 mL/hr, Last Rate: 30 mL/hr (11/16/21 1110)  sodium chloride, 30 mL/hr, Last Rate: 30 mL/hr (11/16/21 1109)            Patient Active Problem List   Diagnosis Code   • Chest pain R07.9   • Non Q wave myocardial infarction (HCC) I21.4   • Coronary artery disease of native heart with stable angina pectoris (HCC) I25.118   • CAD (coronary artery disease) I25.10   • Essential hypertension I10    • Type 2 diabetes mellitus with circulatory disorder (HCC) E11.59   • Scleroderma ([facility]) M34.9       Assessment & Plan      -NSTEMI, Severe multivessel CAD-s/p CABGx4 LIMA/rsvg--POD#2 [place]  -Diabetes type 2  -scleroderma-on plaquenil   -leukocytosis-- reactive  -Post op anemia- expected acute blood loss     Looks good this morning  2L NC--wean as able  CXR with gas bubble noted-- will add some reglan for a few doses to help gastric motility  Encourage pulmonary toilet  Mobilize   Discontinue chest tubes, central line and olmstead  Routine care    Jordana Rosa, APRN  11/18/21  09:12 EST

## 2021-11-18 NOTE — PLAN OF CARE
Goal Outcome Evaluation:  Plan of Care Reviewed With: patient        Progress: improving  Outcome Summary: Pt SR on tele, on 1L, other VSS, has chest tubes x2, R IJ, olmstead, all patent, ambulating well, wctm

## 2021-11-18 NOTE — PROGRESS NOTES
Name: Thai Wu ADMIT: 2021   : 1950  PCP: Jairo Curtis MD    MRN: [id number] LOS: 6 days   AGE/SEX: 71 y.o. male  ROOM: [facility]/     Subjective   Subjective   Doing about the same.  Walking some and without complaint    Review of Systems     Objective   Objective   Vital Signs  Temp:  [97.5 °F (36.4 °C)-98 °F (36.7 °C)] 97.5 °F (36.4 °C)  Heart Rate:  [] 106  Resp:  [18] 18  BP: (108-120)/(59-84) 120/67  SpO2:  [93 %-96 %] 95 %  on  Flow (L/min):  [1] 1;   Device (Oxygen Therapy): nasal cannula  Body mass index is 25.8 kg/m².  Physical Exam  Vitals and nursing note reviewed.   Constitutional:       General: He is not in acute distress.     Appearance: He is well-developed.   HENT:      Head: Normocephalic and atraumatic.   Eyes:      General: No scleral icterus.  Cardiovascular:      Rate and Rhythm: Normal rate and regular rhythm.      Heart sounds: No murmur heard.      Pulmonary:      Effort: Pulmonary effort is normal. No respiratory distress.   Musculoskeletal:      Right lower leg: No edema.      Left lower leg: No edema.   Skin:     General: Skin is warm and dry.      Findings: No rash.   Neurological:      Mental Status: He is alert and oriented to person, place, and time.         Results Review     I reviewed the patient's new clinical results.  Results from last 7 days   Lab Units 21  0350 21  0401 21  1446 21  1040   WBC 10*3/mm3 13.78* 14.09* 16.05* 16.39*   HEMOGLOBIN g/dL 11.6* 11.2* 12.8* 13.1   PLATELETS 10*3/mm3 139* 131* 147 137*     Results from last 7 days   Lab Units 21  0350 21  0401 21  1446 21  1040   SODIUM mmol/L 137 141 141 139   POTASSIUM mmol/L 4.6 4.1 4.0 4.2   CHLORIDE mmol/L 104 109* 110* 109*   CO2 mmol/L 20.5* 20.8* 23.2 21.6*   BUN mg/dL 20 21 14 14   CREATININE mg/dL 0.87 0.84 0.88 0.95   GLUCOSE mg/dL 235* 106* 129* 155*   EGFR IF NONAFRICN AM mL/min/1.73 86 90 85 78     Results from  last 7 days   Lab Units 11/17/21  0401 11/16/21  1446 11/16/21  1040 11/15/21  1004 11/12/21 2119 11/12/21 2119   ALBUMIN g/dL 4.60 5.00 4.30 4.00   < > 4.80   BILIRUBIN mg/dL  --   --   --  0.3  --  0.4   ALK PHOS U/L  --   --   --  53  --  49   AST (SGOT) U/L  --   --   --  29  --  18   ALT (SGPT) U/L  --   --   --  47*  --  21    < > = values in this interval not displayed.     Results from last 7 days   Lab Units 11/18/21  0350 11/17/21  0401 11/16/21  1446 11/16/21  1040 11/16/21  0317 11/15/21  1004 11/12/21 2119 11/12/21 2119   CALCIUM mg/dL 8.5* 8.6 8.5* 8.7   < > 9.0   < > 9.4   ALBUMIN g/dL  --  4.60 5.00 4.30  --  4.00   < > 4.80   MAGNESIUM mg/dL  --  2.3 2.5* 3.0*  --   --   --  2.3   PHOSPHORUS mg/dL  --  4.1 2.7 1.8*  --   --   --   --     < > = values in this interval not displayed.       COVID19   Date Value Ref Range Status   11/15/2021 Not Detected Not Detected - Ref. Range Final   11/13/2021 Not Detected Not Detected - Ref. Range Final     Glucose   Date/Time Value Ref Range Status   11/18/2021 1626 299 (H) 70 - 130 mg/dL Final     Comment:     Meter: [id number] : [id number] Cappetti Reyes NA   11/18/2021 1114 244 (H) 70 - 130 mg/dL Final     Comment:     Meter: [id number] : [id number] Cappetti Reyes NA   11/18/2021 0527 248 (H) 70 - 130 mg/dL Final     Comment:     Meter: [id number] : [id number] Yung Guevara  NA   11/17/2021 2014 231 (H) 70 - 130 mg/dL Final     Comment:     Meter: [id number] : [id number] Yung Guevara  NA   11/17/2021 1607 173 (H) 70 - 130 mg/dL Final     Comment:     Meter: [id number] : [id number] Varinder Chambers NA   11/17/2021 1138 223 (H) 70 - 130 mg/dL Final     Comment:     Meter: [id number] : [id number] Varinder Chambers    11/17/2021 0735 165 (H) 70 - 130 mg/dL Final     Comment:     Meter: [id number] : [id number] Esthela MIRANDA       XR Chest 1 View  Narrative: Portable chest radiograph     HISTORY:Chest tube removal      TECHNIQUE: Single AP portable radiograph of the chest     COMPARISON:Multiple chest radiographs dating back to 11/17/2021     Impression: FINDINGS AND IMPRESSION:  Previously seen right internal jugular sheath and thoracostomy tubes  overlying the mediastinum and left lung are no longer visualized. There  are median sternotomy wires and presumed epicardial pacing wires.     The lungs are hypoinflated pulmonary opacification is present within the  bilateral mid to lower lungs, grossly unchanged since chest CT performed  11/18/2021 at 4:12 AM. Possible tiny left apical pneumothorax resulting  in approximately 3 mm of pleural parenchymal separation is present. This  was discussed with Hai, the patient's nurse, at 11:45 AM 11/18/2021.  Cardiac silhouette is accentuated by low lung volumes. Prominent bowel  loops are present within the upper abdomen, as before.     This report was finalized on 11/18/2021 11:49 AM by Dr. Jean Hebert M.D.     XR Chest 1 View  Narrative: SINGLE VIEW OF THE CHEST     HISTORY: Postop open heart surgery     COMPARISON: 11/17/2021     FINDINGS:  Cardiomegaly is present. There is vascular congestion. Tubes and lines  appear stable. There is persistent bibasilar atelectasis and bilateral  pleural effusions. No definite pneumothorax is seen. There is gaseous  distention of bowel within the upper abdomen.     Impression: No significant interval change.     This report was finalized on 11/18/2021 5:41 AM by Dr. Marielle Fowler M.D.       Scheduled Medications  aspirin, 81 mg, Oral, Daily  atorvastatin, 40 mg, Oral, Nightly  chlorhexidine, 15 mL, Mouth/Throat, Q12H  enoxaparin, 40 mg, Subcutaneous, Daily  gabapentin, 100 mg, Oral, Q12H  guaiFENesin, 1,200 mg, Oral, Q12H  insulin lispro, 0-9 Units, Subcutaneous, 4x Daily With Meals & Nightly  linagliptin, 5 mg, Oral, Daily  metoclopramide, 5 mg, Intravenous, Q6H  metoprolol tartrate, 25 mg, Oral, Q12H  mupirocin, , Each Nare,  BID  pantoprazole, 40 mg, Intravenous, Once   Followed by  pantoprazole, 40 mg, Oral, QAM  Scopolamine, 1 patch, Transdermal, Q72H  senna-docusate sodium, 2 tablet, Oral, Nightly    Infusions  clevidipine, 2-32 mg/hr  sodium chloride, 30 mL/hr, Last Rate: 30 mL/hr (11/16/21 1110)  sodium chloride, 30 mL/hr, Last Rate: 30 mL/hr (11/16/21 1109)    Diet  Diet Regular; Cardiac       Assessment/Plan     Active Hospital Problems    Diagnosis  POA   • Essential hypertension [I10]  Unknown   • Type 2 diabetes mellitus with circulatory disorder (HCC) [E11.59]  Unknown   • Scleroderma (HCC) [M34.9]  Unknown   • CAD (coronary artery disease) [I25.10]  Yes      Resolved Hospital Problems   No resolved problems to display.       71 y.o. male with history of diabetes and hypertension, transferred here with multivessel CAD and plans for CABG.    · DM2: Poorly controlled today.  Started Tradjenta, will monitor for improvement.  Continue sliding scale insulin    · Status post CABG: Continue low-dose Lopressor as tolerated, dose increased today. On aspirin/Lipitor as well.   · Leukocytosis: Better, presumably reactive. We will continue to monitor  · Dilated bowel loops on x-ray but really no abdominal symptoms.  Reglan started by surgical team.  Continue Protonix and encourage ambulation.  Continue senna S  · Tiny PTX noted on x-ray after chest tube removal.  Seems asymptomatic, continue to monitor.  Defer other management to surgical team    · Discussed with patient and wife at bedside  · Lovenox for DVT prophylaxis.    · Mobilize as tolerated      Awais Lamar MD  [facility]  11/18/21  16:32 EST

## 2021-11-18 NOTE — THERAPY TREATMENT NOTE
Patient Name: Thai Wu  : 1950    MRN: [id number]                              Today's Date: 2021       Admit Date: 2021    Visit Dx:     ICD-10-CM ICD-9-CM   1. Coronary artery disease involving native coronary artery of native heart without angina pectoris  I25.10 414.01   2. S/P CABG (coronary artery bypass graft)  Z95.1 V45.81     Patient Active Problem List   Diagnosis   • Chest pain   • Non Q wave myocardial infarction (HCC)   • Coronary artery disease of native heart with stable angina pectoris (HCC)   • CAD (coronary artery disease)   • Essential hypertension   • Type 2 diabetes mellitus with circulatory disorder (HCC)   • Scleroderma (HCC)     Past Medical History:   Diagnosis Date   • Hypertension    • Scleroderma (HCC)    • Type 2 diabetes mellitus (HCC)      Past Surgical History:   Procedure Laterality Date   • CARDIAC CATHETERIZATION Left 2021    Procedure: Left Heart Cath;  Surgeon: Leroy Parker MD;  Location: [facility] CATH INVASIVE LOCATION;  Service: Cardiology;  Laterality: Left;   • CORONARY ARTERY BYPASS GRAFT N/A 2021    Procedure: INTRAOP RICKI, MIDLINE STERNOTOMY, CORONARY ARTERY BYPASS GRAFTING X UTILIZING LEFT JAMES AND ENDOSCOPICALLY HARVESTED RIGHT GREATER SAPHENOUS VEIN AND PRP.;  Surgeon: Jr Lizandro Schwab MD;  Location: [facility];  Service: Cardiothoracic;  Laterality: N/A;   • HERNIA REPAIR        General Information     Row Name 21 1448          Physical Therapy Time and Intention    Document Type therapy note (daily note)  -CF     Mode of Treatment physical therapy; individual therapy  -CF     Row Name 21 1448          General Information    Patient Profile Reviewed yes  -CF     Existing Precautions/Restrictions fall; sternal; cardiac  -CF     Row Name 21 1448          Cognition    Orientation Status (Cognition) oriented x 4  -CF     Row Name 21 1448          Safety Issues, Functional Mobility    Impairments  Affecting Function (Mobility) balance; pain; strength; endurance/activity tolerance  -CF           User Key  (r) = Recorded By, (t) = Taken By, (c) = Cosigned By    Initials Name Provider Type    Tori Vázquez PT Physical Therapist               Mobility     Row Name 11/18/21 1448          Bed Mobility    Bed Mobility supine-sit  -CF     Supine-Sit [place] (Bed Mobility) minimum assist (75% patient effort); verbal cues; moderate assist (50% patient effort)  -CF     Assistive Device (Bed Mobility) head of bed elevated  -CF     Comment (Bed Mobility) Increased time to scoot forward to EOB  -CF     Row Name 11/18/21 1448          Sit-Stand Transfer    Sit-Stand [place] (Transfers) 2 person assist; minimum assist (75% patient effort); verbal cues  -CF     Row Name 11/18/21 1448          Gait/Stairs (Locomotion)    [place] Level (Gait) minimum assist (75% patient effort); 2 person assist; verbal cues  -CF     Assistive Device (Gait) --  HHA  -CF     Distance in Feet (Gait) 80'  -CF     Deviations/Abnormal Patterns (Gait) gait speed decreased; stride length decreased; aleah decreased  -CF     Bilateral Gait Deviations forward flexed posture  -CF     Comment (Gait/Stairs) Cues to increase step length and for upright posture  -CF           User Key  (r) = Recorded By, (t) = Taken By, (c) = Cosigned By    Initials Name Provider Type    Tori Vázquez PT Physical Therapist               Obj/Interventions     Row Name 11/18/21 1449          Motor Skills    Therapeutic Exercise other (see comments)  cardiac protocol x10 reps  -CF           User Key  (r) = Recorded By, (t) = Taken By, (c) = Cosigned By    Initials Name Provider Type     Tori Mcclure PT Physical Therapist               Goals/Plan    No documentation.                Clinical Impression     Row Name 11/18/21 1450          Pain    Additional Documentation Pain Scale: Numbers Pre/Post-Treatment (Group)  -     Row Name  11/18/21 1450          Pain Scale: Numbers Pre/Post-Treatment    Pretreatment Pain Rating 3/10  -CF     Pain Location - Orientation incisional  -CF     Pain Location chest  -CF     Pain Intervention(s) Repositioned; Ambulation/increased activity  -CF     Row Name 11/18/21 1450          Plan of Care Review    Plan of Care Reviewed With patient; family  -CF     Outcome Summary Pt seen for PT treatment this afternoon. Pt increased ambulation distance to 80' with cga/min A x2. Cues to increase step length and for posture. Reviewed HEP and encouraged pt to perform 2-3x/day. PT will continue to follow and progress as able.  -CF     Row Name 11/18/21 1450          Vital Signs    Pre Systolic BP Rehab 114  -CF     Pre Treatment Diastolic BP 69  -CF     Pretreatment Heart Rate (beats/min) 110  -CF     O2 Delivery Pre Treatment nasal cannula  1L  -CF     O2 Delivery Intra Treatment nasal cannula  -CF     O2 Delivery Post Treatment nasal cannula  -CF     Row Name 11/18/21 6700          Positioning and Restraints    Pre-Treatment Position in bed  -CF     In Chair reclined; call light within reach; encouraged to call for assist; notified nsg; with family/caregiver  -CF           User Key  (r) = Recorded By, (t) = Taken By, (c) = Cosigned By    Initials Name Provider Type    CF Tori Mcclure, PT Physical Therapist               Outcome Measures     Row Name 11/18/21 0262          How much help from another person do you currently need...    Turning from your back to your side while in flat bed without using bedrails? 3  -CF     Moving from lying on back to sitting on the side of a flat bed without bedrails? 3  -CF     Moving to and from a bed to a chair (including a wheelchair)? 3  -CF     Standing up from a chair using your arms (e.g., wheelchair, bedside chair)? 3  -CF     Climbing 3-5 steps with a railing? 2  -CF     To walk in hospital room? 3  -CF     AM-PAC 6 Clicks Score (PT) 17  -CF     Row Name 11/18/21 1452           Functional Assessment    Outcome Measure Options AM-PAC 6 Clicks Basic Mobility (PT)  -CF           User Key  (r) = Recorded By, (t) = Taken By, (c) = Cosigned By    Initials Name Provider Type    CF Tori Mcclure, TED Physical Therapist                             Physical Therapy Education                 Title: PT OT SLP Therapies (Done)     Topic: Physical Therapy (Done)     Point: Mobility training (Done)     Learning Progress Summary           Patient Acceptance, E, VU by  at 11/18/2021 1452    Acceptance, E,D, DU by  at 11/17/2021 0934                   Point: Home exercise program (Done)     Learning Progress Summary           Patient Acceptance, E, VU by CF at 11/18/2021 1452    Acceptance, E,D, DU by PC at 11/17/2021 0934                   Point: Body mechanics (Done)     Learning Progress Summary           Patient Acceptance, E, VU by  at 11/18/2021 1452    Acceptance, E,D, DU by PC at 11/17/2021 0934                   Point: Precautions (Done)     Learning Progress Summary           Patient Acceptance, E, VU by  at 11/18/2021 1452    Acceptance, E,D, DU by PC at 11/17/2021 0934                               User Key     Initials Effective Dates Name Provider Type Discipline     06/16/21 -  Chiara Roberson PT Physical Therapist PT     06/16/21 -  Tori Mcclure PT Physical Therapist PT              PT Recommendation and Plan     Plan of Care Reviewed With: patient, family  Outcome Summary: Pt seen for PT treatment this afternoon. Pt increased ambulation distance to 80' with cga/min A x2. Cues to increase step length and for posture. Reviewed HEP and encouraged pt to perform 2-3x/day. PT will continue to follow and progress as able.     Time Calculation:    PT Charges     Row Name 11/18/21 1453             Time Calculation    Start Time 1348  -CF      Stop Time 1401  -      Time Calculation (min) 13 min  -      PT Received On 11/18/21  -      PT - Next Appointment 11/19/21  -             User Key  (r) = Recorded By, (t) = Taken By, (c) = Cosigned By    Initials Name Provider Type    CF Tori Mcclure, PT Physical Therapist              Therapy Charges for Today     Code Description Service Date Service Provider Modifiers Qty    51981224453  PT THERAPEUTIC ACT EA 15 MIN 11/18/2021 Tori Mcclure, PT GP 1    88078254064 HC PT THER SUPP EA 15 MIN 11/18/2021 Tori Mcclure, PT GP 1          PT G-Codes  Outcome Measure Options: AM-PAC 6 Clicks Basic Mobility (PT)  AM-PAC 6 Clicks Score (PT): 17    Tori Mcclure, PT  11/18/2021

## 2021-11-18 NOTE — PLAN OF CARE
Goal Outcome Evaluation:  Plan of Care Reviewed With: patient, family           Outcome Summary: Pt seen for PT treatment this afternoon. Pt increased ambulation distance to 80' with cga/min A x2. Cues to increase step length and for posture. Reviewed HEP and encouraged pt to perform 2-3x/day. PT will continue to follow and progress as able.

## 2021-11-18 NOTE — CASE MANAGEMENT/SOCIAL WORK
Discharge Planning Assessment  [facility]     Patient Name: Thai Wu  MRN: [id number]  Today's Date: 11/18/2021    Admit Date: 11/12/2021     Discharge Needs Assessment     Row Name 11/18/21 1507       Living Environment    Lives With spouse    Name(s) of Who Lives With Patient emma Springer    Current Living Arrangements home/apartment/condo    Primary Care Provided by self    Provides Primary Care For no one    Family Caregiver if Needed spouse    Family Caregiver Names emma Fishman    Quality of Family Relationships helpful; involved; supportive    Able to Return to Prior Arrangements yes       Resource/Environmental Concerns    Resource/Environmental Concerns none    Transportation Concerns car, none       Transition Planning    Patient/Family Anticipates Transition to home with family    Patient/Family Anticipated Services at Transition home health care    Transportation Anticipated family or friend will provide       Discharge Needs Assessment    Readmission Within the Last 30 Days no previous admission in last 30 days    Equipment Currently Used at Home none    Concerns to be Addressed discharge planning    Anticipated Changes Related to Illness none    Equipment Needed After Discharge none    Discharge Facility/Level of Care Needs home with home health    Provided Post Acute Provider List? Yes    Post Acute Provider List Home Health    Delivered To Patient    Method of Delivery In person    Patient's Choice of Community Agency(s) [facility]               Discharge Plan     Row Name 11/18/21 1509       Plan    Plan Home w/ [facility]    Plan Comments CCP spoke with Pt at bedside.  CCP introduced self and role.  Pt confirmed information on face sheet.  Pt stated he is IADL’s, retired and drives.  Pt reports he lives with his spouse, Sravanthi Wu ([phone]) in a multi-level home with six entrance stair steps.  Pt reports PCP is Jairo  Ever.  Pt confirmed pharmacy is [facility] [place], [place].  Pt already enrolled in [facility].  Pt denies issues with affording his medications.  Pt denies past home health, sub-acute rehab or DME.  Pt chose [facility] to follow him at discharge.  Referral given to Marisa/[facility].  Pt reports his wife will transport him home at d/c.  CCP will continue to follow…….JEREMIAH RN/CCP              Continued Care and Services - Admitted Since 11/12/2021     Home Medical Care     Service Provider Request Status Selected Services Address Phone Fax Patient Preferred    [facility]  Pending - Request Sent N/A [address] [facility] [id number] [phone] --              Expected Discharge Date and Time     Expected Discharge Date Expected Discharge Time    Nov 23, 2021          Demographic Summary     Row Name 11/18/21 1506       General Information    Admission Type inpatient    Arrived From hospital  [facility]    Required Notices Provided Important Message from Medicare    Referral Source admission list; physician    Reason for Consult discharge planning    Preferred Language English     Used During This Interaction no               Functional Status     Row Name 11/18/21 1507       Functional Status    Usual Activity Tolerance good    Current Activity Tolerance moderate       Functional Status, IADL    Medications independent    Meal Preparation independent    Housekeeping independent    Laundry independent    Shopping independent       Mental Status    General Appearance WDL WDL       Mental Status Summary    Recent Changes in Mental Status/Cognitive Functioning no changes       Employment/    Employment Status retired               Psychosocial    No documentation.                Abuse/Neglect    No documentation.                Legal    No documentation.                Substance Abuse    No documentation.                 Patient Forms    No documentation.                   Emili Harrison RN

## 2021-11-18 NOTE — PLAN OF CARE
Goal Outcome Evaluation:  Plan of Care Reviewed With: patient        Progress: improving  Outcome Summary: Pt SR/ST on tele, 1L, other vss, removed chets tubes, IJ, and Martin today, no complication, wctm

## 2021-11-18 NOTE — DISCHARGE PLACEMENT REQUEST
"Thai Qureshi (71 y.o. Male)             Date of Birth Social Security Number Address Home Phone MRN    1950  [address] [facility] [address] [phone] [id number]    Yarsani Marital Status             None        Admission Date Admission Type Admitting Provider Attending Provider Department, Room/Bed    11/12/21 Urgent Jr Lizandro Schwab MD [place], Jr Samuel B, MD [facility] CARDIOVASC UNIT, 2227/1    Discharge Date Discharge Disposition Discharge Destination                         Attending Provider: Jr Lizandro Schwab MD    Allergies: No Known Allergies    Isolation: None   Infection: None   Code Status: CPR   Advance Care Planning Activity    Ht: 177.8 cm (70\")   Wt: 81.6 kg (179 lb 12.8 oz)    Admission Cmt: None   Principal Problem: None                Active Insurance as of 11/12/2021     Primary Coverage     Payor Plan Insurance Group Employer/Plan Group    MEDICARE MEDICARE A & B      Payor Plan Address Payor Plan Phone Number Payor Plan Fax Number Effective Dates    [address] [phone]  1/1/2013 - None Entered    [facility] [id number]       Subscriber Name Subscriber Birth Date Member ID       THAI QURESHI 1950 [id number]           Secondary Coverage     Payor Plan Insurance Group Employer/Plan Group    [employer]                Payor Plan Address Payor Plan Phone Number Payor Plan Fax Number Effective Dates    [address] [address]   1/1/2018 - None Entered    [facility] [address]       Subscriber Name Subscriber Birth Date Member ID       THAI QURESHI 1950 [id number]                 Emergency Contacts      (Rel.) Home Phone Work Phone Mobile Phone    VANESSA QURESHI (Spouse) [phone] -- [phone]              "

## 2021-11-18 NOTE — CONSULTS
Met with patient, discussed benefits of cardiac rehab. Provided phase II information along with the contact information for cardiac rehab at [facility]. Requested for referral to be sent. Explained if receiving home health would not be able to attend cardiac rehab until finished with home health.

## 2021-11-19 ENCOUNTER — APPOINTMENT (OUTPATIENT)
Dept: GENERAL RADIOLOGY | Facility: HOSPITAL | Age: 71
End: 2021-11-19

## 2021-11-19 LAB
ANION GAP SERPL CALCULATED.3IONS-SCNC: 9.8 MMOL/L (ref 5–15)
BH BB BLOOD EXPIRATION DATE: NORMAL
BH BB BLOOD EXPIRATION DATE: NORMAL
BH BB BLOOD TYPE BARCODE: 6200
BH BB BLOOD TYPE BARCODE: 6200
BH BB DISPENSE STATUS: NORMAL
BH BB DISPENSE STATUS: NORMAL
BH BB PRODUCT CODE: NORMAL
BH BB PRODUCT CODE: NORMAL
BH BB UNIT NUMBER: NORMAL
BH BB UNIT NUMBER: NORMAL
BUN SERPL-MCNC: 20 MG/DL (ref 8–23)
BUN/CREAT SERPL: 29.9 (ref 7–25)
CALCIUM SPEC-SCNC: 8.8 MG/DL (ref 8.6–10.5)
CHLORIDE SERPL-SCNC: 103 MMOL/L (ref 98–107)
CO2 SERPL-SCNC: 23.2 MMOL/L (ref 22–29)
CREAT SERPL-MCNC: 0.67 MG/DL (ref 0.76–1.27)
CROSSMATCH INTERPRETATION: NORMAL
CROSSMATCH INTERPRETATION: NORMAL
DEPRECATED RDW RBC AUTO: 40.7 FL (ref 37–54)
ERYTHROCYTE [DISTWIDTH] IN BLOOD BY AUTOMATED COUNT: 11.9 % (ref 12.3–15.4)
GFR SERPL CREATININE-BSD FRML MDRD: 116 ML/MIN/1.73
GLUCOSE BLDC GLUCOMTR-MCNC: 238 MG/DL (ref 70–130)
GLUCOSE BLDC GLUCOMTR-MCNC: 239 MG/DL (ref 70–130)
GLUCOSE BLDC GLUCOMTR-MCNC: 270 MG/DL (ref 70–130)
GLUCOSE BLDC GLUCOMTR-MCNC: 282 MG/DL (ref 70–130)
GLUCOSE SERPL-MCNC: 234 MG/DL (ref 65–99)
HCT VFR BLD AUTO: 32.8 % (ref 37.5–51)
HGB BLD-MCNC: 11.2 G/DL (ref 13–17.7)
MCH RBC QN AUTO: 31.4 PG (ref 26.6–33)
MCHC RBC AUTO-ENTMCNC: 34.1 G/DL (ref 31.5–35.7)
MCV RBC AUTO: 91.9 FL (ref 79–97)
PLATELET # BLD AUTO: 143 10*3/MM3 (ref 140–450)
PMV BLD AUTO: 9.9 FL (ref 6–12)
POTASSIUM SERPL-SCNC: 4.2 MMOL/L (ref 3.5–5.2)
RBC # BLD AUTO: 3.57 10*6/MM3 (ref 4.14–5.8)
SODIUM SERPL-SCNC: 136 MMOL/L (ref 136–145)
UNIT  ABO: NORMAL
UNIT  ABO: NORMAL
UNIT  RH: NORMAL
UNIT  RH: NORMAL
WBC NRBC COR # BLD: 11.35 10*3/MM3 (ref 3.4–10.8)

## 2021-11-19 PROCEDURE — 82962 GLUCOSE BLOOD TEST: CPT

## 2021-11-19 PROCEDURE — 85027 COMPLETE CBC AUTOMATED: CPT | Performed by: NURSE PRACTITIONER

## 2021-11-19 PROCEDURE — 63710000001 INSULIN LISPRO (HUMAN) PER 5 UNITS: Performed by: NURSE PRACTITIONER

## 2021-11-19 PROCEDURE — 71046 X-RAY EXAM CHEST 2 VIEWS: CPT

## 2021-11-19 PROCEDURE — 94762 N-INVAS EAR/PLS OXIMTRY CONT: CPT

## 2021-11-19 PROCEDURE — 63710000001 INSULIN GLARGINE PER 5 UNITS: Performed by: INTERNAL MEDICINE

## 2021-11-19 PROCEDURE — 25010000002 ENOXAPARIN PER 10 MG: Performed by: NURSE PRACTITIONER

## 2021-11-19 PROCEDURE — 80048 BASIC METABOLIC PNL TOTAL CA: CPT | Performed by: NURSE PRACTITIONER

## 2021-11-19 PROCEDURE — 25010000002 METOCLOPRAMIDE PER 10 MG: Performed by: NURSE PRACTITIONER

## 2021-11-19 PROCEDURE — 97110 THERAPEUTIC EXERCISES: CPT

## 2021-11-19 RX ORDER — INSULIN LISPRO 100 [IU]/ML
0-14 INJECTION, SOLUTION INTRAVENOUS; SUBCUTANEOUS
Status: DISCONTINUED | OUTPATIENT
Start: 2021-11-19 | End: 2021-11-20 | Stop reason: HOSPADM

## 2021-11-19 RX ORDER — DOCUSATE SODIUM 100 MG/1
100 CAPSULE, LIQUID FILLED ORAL 2 TIMES DAILY
Status: DISCONTINUED | OUTPATIENT
Start: 2021-11-19 | End: 2021-11-20 | Stop reason: HOSPADM

## 2021-11-19 RX ORDER — GLIPIZIDE 5 MG/1
2.5 TABLET ORAL
Status: DISCONTINUED | OUTPATIENT
Start: 2021-11-19 | End: 2021-11-19

## 2021-11-19 RX ORDER — INSULIN GLARGINE 100 [IU]/ML
5 INJECTION, SOLUTION SUBCUTANEOUS NIGHTLY
Status: DISCONTINUED | OUTPATIENT
Start: 2021-11-19 | End: 2021-11-20

## 2021-11-19 RX ADMIN — ASPIRIN 81 MG: 81 TABLET, COATED ORAL at 09:04

## 2021-11-19 RX ADMIN — INSULIN LISPRO 8 UNITS: 100 INJECTION, SOLUTION INTRAVENOUS; SUBCUTANEOUS at 12:38

## 2021-11-19 RX ADMIN — ENOXAPARIN SODIUM 40 MG: 40 INJECTION SUBCUTANEOUS at 09:04

## 2021-11-19 RX ADMIN — ATORVASTATIN CALCIUM 40 MG: 20 TABLET, FILM COATED ORAL at 20:35

## 2021-11-19 RX ADMIN — INSULIN LISPRO 5 UNITS: 100 INJECTION, SOLUTION INTRAVENOUS; SUBCUTANEOUS at 17:54

## 2021-11-19 RX ADMIN — METOPROLOL TARTRATE 25 MG: 25 TABLET, FILM COATED ORAL at 09:04

## 2021-11-19 RX ADMIN — INSULIN LISPRO 5 UNITS: 100 INJECTION, SOLUTION INTRAVENOUS; SUBCUTANEOUS at 09:08

## 2021-11-19 RX ADMIN — PANTOPRAZOLE SODIUM 40 MG: 40 TABLET, DELAYED RELEASE ORAL at 06:49

## 2021-11-19 RX ADMIN — METOPROLOL TARTRATE 25 MG: 25 TABLET, FILM COATED ORAL at 20:35

## 2021-11-19 RX ADMIN — GABAPENTIN 100 MG: 100 CAPSULE ORAL at 20:35

## 2021-11-19 RX ADMIN — GABAPENTIN 100 MG: 100 CAPSULE ORAL at 09:04

## 2021-11-19 RX ADMIN — INSULIN LISPRO 4 UNITS: 100 INJECTION, SOLUTION INTRAVENOUS; SUBCUTANEOUS at 06:54

## 2021-11-19 RX ADMIN — MUPIROCIN 1 APPLICATION: 20 OINTMENT TOPICAL at 06:49

## 2021-11-19 RX ADMIN — CHLORHEXIDINE GLUCONATE 15 ML: 1.2 RINSE ORAL at 06:48

## 2021-11-19 RX ADMIN — GUAIFENESIN 1200 MG: 600 TABLET, EXTENDED RELEASE ORAL at 09:04

## 2021-11-19 RX ADMIN — INSULIN GLARGINE 5 UNITS: 100 INJECTION, SOLUTION SUBCUTANEOUS at 17:53

## 2021-11-19 RX ADMIN — INSULIN LISPRO 8 UNITS: 100 INJECTION, SOLUTION INTRAVENOUS; SUBCUTANEOUS at 20:36

## 2021-11-19 RX ADMIN — LINAGLIPTIN 5 MG: 5 TABLET, FILM COATED ORAL at 09:04

## 2021-11-19 RX ADMIN — METOCLOPRAMIDE HYDROCHLORIDE 5 MG: 5 INJECTION INTRAMUSCULAR; INTRAVENOUS at 06:49

## 2021-11-19 RX ADMIN — GUAIFENESIN 1200 MG: 600 TABLET, EXTENDED RELEASE ORAL at 20:35

## 2021-11-19 NOTE — PROGRESS NOTES
LOS: 7 days   Patient Care Team:  Jairo Curtis MD as PCP - General (Internal Medicine)    Chief Complaint: post op    Subjective      Vital Signs  Temp:  [97.7 °F (36.5 °C)-99.1 °F (37.3 °C)] 98.9 °F (37.2 °C)  Heart Rate:  [] 105  Resp:  [16-18] 16  BP: (106-126)/(55-74) 106/55  Body mass index is 25.66 kg/m².    Intake/Output Summary (Last 24 hours) at 11/19/2021 0823  Last data filed at 11/19/2021 0500  Gross per 24 hour   Intake 840 ml   Output 280 ml   Net 560 ml     No intake/output data recorded.          11/17/21  0500 11/18/21  0706 11/19/21  0416   Weight: 82.3 kg (181 lb 7 oz) 81.6 kg (179 lb 12.8 oz) 81.1 kg (178 lb 12.8 oz)         Objective    Results Review:        WBC WBC   Date Value Ref Range Status   11/19/2021 11.35 (H) 3.40 - 10.80 10*3/mm3 Final   11/18/2021 13.78 (H) 3.40 - 10.80 10*3/mm3 Final   11/17/2021 14.09 (H) 3.40 - 10.80 10*3/mm3 Final   11/16/2021 16.05 (H) 3.40 - 10.80 10*3/mm3 Final   11/16/2021 16.39 (H) 3.40 - 10.80 10*3/mm3 Final      HGB Hemoglobin   Date Value Ref Range Status   11/19/2021 11.2 (L) 13.0 - 17.7 g/dL Final   11/18/2021 11.6 (L) 13.0 - 17.7 g/dL Final   11/17/2021 11.2 (L) 13.0 - 17.7 g/dL Final   11/16/2021 12.8 (L) 13.0 - 17.7 g/dL Final   11/16/2021 13.1 13.0 - 17.7 g/dL Final   11/16/2021 9.9 (L) 12.0 - 17.0 g/dL Final   11/16/2021 11.2 (L) 12.0 - 17.0 g/dL Final   11/16/2021 11.2 (L) 12.0 - 17.0 g/dL Final   11/16/2021 10.5 (L) 12.0 - 17.0 g/dL Final   11/16/2021 12.6 12.0 - 17.0 g/dL Final      HCT Hematocrit   Date Value Ref Range Status   11/19/2021 32.8 (L) 37.5 - 51.0 % Final   11/18/2021 34.8 (L) 37.5 - 51.0 % Final   11/17/2021 31.8 (L) 37.5 - 51.0 % Final   11/16/2021 36.9 (L) 37.5 - 51.0 % Final   11/16/2021 37.1 (L) 37.5 - 51.0 % Final   11/16/2021 29 (L) 38 - 51 % Final   11/16/2021 33 (L) 38 - 51 % Final   11/16/2021 33 (L) 38 - 51 % Final   11/16/2021 31 (L) 38 - 51 % Final   11/16/2021 37 (L) 38 - 51 % Final      Platelets  Platelets   Date Value Ref Range Status   11/19/2021 143 140 - 450 10*3/mm3 Final   11/18/2021 139 (L) 140 - 450 10*3/mm3 Final   11/17/2021 131 (L) 140 - 450 10*3/mm3 Final   11/16/2021 147 140 - 450 10*3/mm3 Final   11/16/2021 137 (L) 140 - 450 10*3/mm3 Final        PT/INR:    Protime   Date Value Ref Range Status   11/17/2021 15.6 (H) 11.7 - 14.2 Seconds Final   11/16/2021 16.1 (H) 11.7 - 14.2 Seconds Final   /  INR   Date Value Ref Range Status   11/17/2021 1.26 (H) 0.90 - 1.10 Final   11/16/2021 1.31 (H) 0.90 - 1.10 Final       Sodium Sodium   Date Value Ref Range Status   11/19/2021 136 136 - 145 mmol/L Final   11/18/2021 137 136 - 145 mmol/L Final   11/17/2021 141 136 - 145 mmol/L Final   11/16/2021 141 136 - 145 mmol/L Final   11/16/2021 139 136 - 145 mmol/L Final      Potassium Potassium   Date Value Ref Range Status   11/19/2021 4.2 3.5 - 5.2 mmol/L Final   11/18/2021 4.6 3.5 - 5.2 mmol/L Final   11/17/2021 4.1 3.5 - 5.2 mmol/L Final   11/16/2021 4.0 3.5 - 5.2 mmol/L Final   11/16/2021 4.2 3.5 - 5.2 mmol/L Final      Chloride Chloride   Date Value Ref Range Status   11/19/2021 103 98 - 107 mmol/L Final   11/18/2021 104 98 - 107 mmol/L Final   11/17/2021 109 (H) 98 - 107 mmol/L Final   11/16/2021 110 (H) 98 - 107 mmol/L Final   11/16/2021 109 (H) 98 - 107 mmol/L Final      Bicarbonate CO2   Date Value Ref Range Status   11/19/2021 23.2 22.0 - 29.0 mmol/L Final   11/18/2021 20.5 (L) 22.0 - 29.0 mmol/L Final   11/17/2021 20.8 (L) 22.0 - 29.0 mmol/L Final   11/16/2021 23.2 22.0 - 29.0 mmol/L Final   11/16/2021 21.6 (L) 22.0 - 29.0 mmol/L Final      BUN BUN   Date Value Ref Range Status   11/19/2021 20 8 - 23 mg/dL Final   11/18/2021 20 8 - 23 mg/dL Final   11/17/2021 21 8 - 23 mg/dL Final   11/16/2021 14 8 - 23 mg/dL Final   11/16/2021 14 8 - 23 mg/dL Final      Creatinine Creatinine   Date Value Ref Range Status   11/19/2021 0.67 (L) 0.76 - 1.27 mg/dL Final   11/18/2021 0.87 0.76 - 1.27 mg/dL Final    11/17/2021 0.84 0.76 - 1.27 mg/dL Final   11/16/2021 0.88 0.76 - 1.27 mg/dL Final   11/16/2021 0.95 0.76 - 1.27 mg/dL Final      Calcium Calcium   Date Value Ref Range Status   11/19/2021 8.8 8.6 - 10.5 mg/dL Final   11/18/2021 8.5 (L) 8.6 - 10.5 mg/dL Final   11/17/2021 8.6 8.6 - 10.5 mg/dL Final   11/16/2021 8.5 (L) 8.6 - 10.5 mg/dL Final   11/16/2021 8.7 8.6 - 10.5 mg/dL Final      Magnesium Magnesium   Date Value Ref Range Status   11/17/2021 2.3 1.6 - 2.4 mg/dL Final   11/16/2021 2.5 (H) 1.6 - 2.4 mg/dL Final   11/16/2021 3.0 (H) 1.6 - 2.4 mg/dL Final          aspirin, 81 mg, Oral, Daily  atorvastatin, 40 mg, Oral, Nightly  chlorhexidine, 15 mL, Mouth/Throat, Q12H  enoxaparin, 40 mg, Subcutaneous, Daily  gabapentin, 100 mg, Oral, Q12H  guaiFENesin, 1,200 mg, Oral, Q12H  insulin lispro, 0-14 Units, Subcutaneous, 4x Daily With Meals & Nightly  linagliptin, 5 mg, Oral, Daily  metoprolol tartrate, 25 mg, Oral, Q12H  mupirocin, , Each Nare, BID  pantoprazole, 40 mg, Intravenous, Once   Followed by  pantoprazole, 40 mg, Oral, QAM  Scopolamine, 1 patch, Transdermal, Q72H  senna-docusate sodium, 2 tablet, Oral, Nightly      clevidipine, 2-32 mg/hr  sodium chloride, 30 mL/hr, Last Rate: 30 mL/hr (11/16/21 1110)  sodium chloride, 30 mL/hr, Last Rate: 30 mL/hr (11/16/21 1109)            Patient Active Problem List   Diagnosis Code   • Chest pain R07.9   • Non Q wave myocardial infarction (HCC) I21.4   • Coronary artery disease of native heart with stable angina pectoris ([facility]) I25.118   • CAD (coronary artery disease) I25.10   • Essential hypertension I10   • Type 2 diabetes mellitus with circulatory disorder ([facility]) E11.59   • Scleroderma ([facility]) M34.9       Assessment & Plan        -NSTEMI, Severe multivessel CAD-s/p CABGx4 LIMA/rsvg--POD#3 [place]  -Diabetes type 2  -scleroderma-on plaquenil   -leukocytosis-- reactive  -Post op anemia- expected acute blood loss     Looks good this morning  On room air  Still some  complaints of dizziness when ambulating-- will check orthostatics  Encourage pulmonary toilet  Mobilize   Discontinue AV wires  Routine care       Jordana Rosa, APRN  11/19/21  08:23 EST

## 2021-11-19 NOTE — PROGRESS NOTES
Name: Thai Wu ADMIT: 2021   : 1950  PCP: Jairo Curtis MD    MRN: [id number] LOS: 7 days   AGE/SEX: 71 y.o. male  ROOM[facility]   Subjective   No chief complaint on file.  cc- post op pain    Pain is fair  Better with rest  BG elevated   Mostly 200s      ROS  No f/c  No n/v  No cp/palp  No soa/cough    Objective   Vital Signs  Temp:  [97.4 °F (36.3 °C)-99.1 °F (37.3 °C)] 97.4 °F (36.3 °C)  Heart Rate:  [] 95  Resp:  [16-18] 16  BP: (102-126)/(55-74) 102/63  SpO2:  [94 %-96 %] 96 %  on  Flow (L/min):  [1] 1;   Device (Oxygen Therapy): nasal cannula  Body mass index is 25.66 kg/m².    Physical Exam  HENT:      Head: Normocephalic and atraumatic.   Eyes:      General: No scleral icterus.  Cardiovascular:      Rate and Rhythm: Normal rate and regular rhythm.      Heart sounds: Normal heart sounds.   Pulmonary:      Effort: Pulmonary effort is normal. No respiratory distress.      Breath sounds: Normal breath sounds.   Abdominal:      General: There is no distension.      Palpations: Abdomen is soft.      Tenderness: There is no abdominal tenderness.   Musculoskeletal:      Cervical back: Neck supple.   Neurological:      Mental Status: He is alert.   Psychiatric:         Behavior: Behavior normal.         Results Review:       I reviewed the patient's new clinical results.  Results from last 7 days   Lab Units 21  0340 21  0350 21  0401 21  1446   WBC 10*3/mm3 11.35* 13.78* 14.09* 16.05*   HEMOGLOBIN g/dL 11.2* 11.6* 11.2* 12.8*   PLATELETS 10*3/mm3 143 139* 131* 147     Results from last 7 days   Lab Units 21  0340 21  0350 21  0401 21  1446   SODIUM mmol/L 136 137 141 141   POTASSIUM mmol/L 4.2 4.6 4.1 4.0   CHLORIDE mmol/L 103 104 109* 110*   CO2 mmol/L 23.2 20.5* 20.8* 23.2   BUN mg/dL 20 20 21 14   CREATININE mg/dL 0.67* 0.87 0.84 0.88   GLUCOSE mg/dL 234* 235* 106* 129*   Estimated Creatinine Clearance: 94.3 mL/min (A)  (by C-G formula based on SCr of 0.67 mg/dL (L)).  Results from last 7 days   Lab Units 11/17/21  0401 11/16/21  1446 11/16/21  1040 11/15/21  1004 11/12/21 2119 11/12/21 2119   ALBUMIN g/dL 4.60 5.00 4.30 4.00   < > 4.80   BILIRUBIN mg/dL  --   --   --  0.3  --  0.4   ALK PHOS U/L  --   --   --  53  --  49   AST (SGOT) U/L  --   --   --  29  --  18   ALT (SGPT) U/L  --   --   --  47*  --  21    < > = values in this interval not displayed.     Results from last 7 days   Lab Units 11/19/21  0340 11/18/21  0350 11/17/21  0401 11/16/21  1446 11/16/21  1040 11/16/21  1040 11/16/21  0317 11/15/21  1004 11/12/21 2119 11/12/21 2119   CALCIUM mg/dL 8.8 8.5* 8.6 8.5*   < > 8.7   < > 9.0   < > 9.4   ALBUMIN g/dL  --   --  4.60 5.00  --  4.30  --  4.00   < > 4.80   MAGNESIUM mg/dL  --   --  2.3 2.5*  --  3.0*  --   --   --  2.3   PHOSPHORUS mg/dL  --   --  4.1 2.7  --  1.8*  --   --   --   --     < > = values in this interval not displayed.         Coag   Results from last 7 days   Lab Units 11/17/21  0401 11/16/21  1040 11/12/21 2119   INR  1.26* 1.31* 1.00   APTT seconds  --  28.4 29.1     HbA1C   Lab Results   Component Value Date    HGBA1C 8.11 (H) 11/12/2021     Infection     Radiology(recent) XR Chest 1 View    Result Date: 11/18/2021  FINDINGS AND IMPRESSION: Previously seen right internal jugular sheath and thoracostomy tubes overlying the mediastinum and left lung are no longer visualized. There are median sternotomy wires and presumed epicardial pacing wires.  The lungs are hypoinflated pulmonary opacification is present within the bilateral mid to lower lungs, grossly unchanged since chest CT performed 11/18/2021 at 4:12 AM. Possible tiny left apical pneumothorax resulting in approximately 3 mm of pleural parenchymal separation is present. This was discussed with Hai, the patient's nurse, at 11:45 AM 11/18/2021. Cardiac silhouette is accentuated by low lung volumes. Prominent bowel loops are present within the  upper abdomen, as before.  This report was finalized on 11/18/2021 11:49 AM by Dr. Jean Hebert M.D.      XR Chest 1 View    Result Date: 11/18/2021  No significant interval change.  This report was finalized on 11/18/2021 5:41 AM by Dr. Marielle Fowler M.D.      No results found for: TROPONINT, TROPONINI, BNP  No components found for: TSH;2    aspirin, 81 mg, Oral, Daily  atorvastatin, 40 mg, Oral, Nightly  chlorhexidine, 15 mL, Mouth/Throat, Q12H  docusate sodium, 100 mg, Oral, BID  enoxaparin, 40 mg, Subcutaneous, Daily  gabapentin, 100 mg, Oral, Q12H  guaiFENesin, 1,200 mg, Oral, Q12H  insulin lispro, 0-14 Units, Subcutaneous, 4x Daily With Meals & Nightly  linagliptin, 5 mg, Oral, Daily  metoprolol tartrate, 25 mg, Oral, Q12H  mupirocin, , Each Nare, BID  pantoprazole, 40 mg, Intravenous, Once   Followed by  pantoprazole, 40 mg, Oral, QAM  Scopolamine, 1 patch, Transdermal, Q72H  senna-docusate sodium, 2 tablet, Oral, Nightly      clevidipine, 2-32 mg/hr  sodium chloride, 30 mL/hr, Last Rate: 30 mL/hr (11/16/21 1110)  sodium chloride, 30 mL/hr, Last Rate: 30 mL/hr (11/16/21 1109)    Diet Regular; Cardiac, Consistent Carbohydrate      Assessment/Plan      Active Hospital Problems    Diagnosis  POA   • Essential hypertension [I10]  Unknown   • Type 2 diabetes mellitus with circulatory disorder (HCC) [E11.59]  Unknown   • Scleroderma (HCC) [M34.9]  Unknown   • CAD (coronary artery disease) [I25.10]  Yes      Resolved Hospital Problems   No resolved problems to display.         71 y.o. male with history of diabetes and hypertension, transferred here with multivessel CAD and plans for CABG.     · DM2: Poorly controlled today.  Started Tradjenta, will monitor for improvement. A1C 8.1.  Continue sliding scale insulin. At discharge reasonable to go back to his home glimepiride, janumet and follow up with Jairo Curtis MD. Add lantus now for better control here (but will not plan on lantus at  discharge)    · Status post CABG: Continue low-dose Lopressor as tolerated, dose increased today. On aspirin/Lipitor as well.   · Leukocytosis: Better, presumably reactive. We will continue to monitor  · Dilated bowel loops on x-ray but really no abdominal symptoms.  Reglan started by surgical team.  Continue Protonix and encourage ambulation.  Continue senna S     · Discussed with patient and wife at bedside  · Lovenox for DVT prophylaxis.    · Mobilize as tolerated        DW family    Willi Dewitt MD  [facility]  11/19/21  16:11 EST

## 2021-11-19 NOTE — THERAPY TREATMENT NOTE
Acute Care - Physical Therapy Treatment Note  [facility]     Patient Name: Thai Wu  : 1950  MRN: [id number]  Today's Date: 2021      Visit Dx:     ICD-10-CM ICD-9-CM   1. Coronary artery disease involving native coronary artery of native heart without angina pectoris  I25.10 414.01   2. S/P CABG (coronary artery bypass graft)  Z95.1 V45.81     Patient Active Problem List   Diagnosis   • Chest pain   • Non Q wave myocardial infarction (HCC)   • Coronary artery disease of native heart with stable angina pectoris (HCC)   • CAD (coronary artery disease)   • Essential hypertension   • Type 2 diabetes mellitus with circulatory disorder (HCC)   • Scleroderma (HCC)     Past Medical History:   Diagnosis Date   • Hypertension    • Scleroderma (HCC)    • Type 2 diabetes mellitus (HCC)      Past Surgical History:   Procedure Laterality Date   • CARDIAC CATHETERIZATION Left 2021    Procedure: Left Heart Cath;  Surgeon: Leroy Parker MD;  Location: [facility] CATH INVASIVE LOCATION;  Service: Cardiology;  Laterality: Left;   • CORONARY ARTERY BYPASS GRAFT N/A 2021    Procedure: INTRAOP RICKI, MIDLINE STERNOTOMY, CORONARY ARTERY BYPASS GRAFTING X UTILIZING LEFT JAMES AND ENDOSCOPICALLY HARVESTED RIGHT GREATER SAPHENOUS VEIN AND PRP.;  Surgeon: Jr Lizandro Schwab MD;  Location: [facility];  Service: Cardiothoracic;  Laterality: N/A;   • HERNIA REPAIR       PT Assessment (last 12 hours)     PT Evaluation and Treatment     Row Name 21 1100          Physical Therapy Time and Intention    Subjective Information no complaints  -LB     Document Type therapy note (daily note)  -     Mode of Treatment physical therapy  -LB     Row Name 21 1100          General Information    Existing Precautions/Restrictions fall; cardiac; sternal  -LB     Row Name 21 1100          Pain Scale: Numbers Pre/Post-Treatment    Pretreatment Pain Rating 0/10 - no pain  -LB     Row Name 21 1100           Bed Mobility    Comment (Bed Mobility) NT. Up in chair  -LB     Row Name 11/19/21 1100          Gait/Stairs (Locomotion)    [place] Level (Gait) contact guard  -LB     Assistive Device (Gait) walker, front-wheeled  -LB     Distance in Feet (Gait) 170  -LB     Deviations/Abnormal Patterns (Gait) gait speed decreased; stride length decreased  -LB     Bilateral Gait Deviations forward flexed posture  -LB     Row Name 11/19/21 1100          Motor Skills    Therapeutic Exercise shoulder; elbow/forearm; knee; ankle  -LB     Row Name 11/19/21 1100          Shoulder (Therapeutic Exercise)    Shoulder (Therapeutic Exercise) AROM (active range of motion)  -LB     Shoulder AROM (Therapeutic Exercise) 5 repetitions; sitting; bilateral  shoulder rolls  -LB     Row Name 11/19/21 1100          Elbow/Forearm (Therapeutic Exercise)    Elbow/Forearm (Therapeutic Exercise) AROM (active range of motion)  -LB     Elbow/Forearm AROM (Therapeutic Exercise) bilateral; flexion; extension; sitting; 5 repetitions  -LB     Row Name 11/19/21 1100          Knee (Therapeutic Exercise)    Knee (Therapeutic Exercise) AROM (active range of motion)  -LB     Knee AROM (Therapeutic Exercise) bilateral; LAQ (long arc quad); sitting; 5 repetitions  -LB     Row Name 11/19/21 1100          Ankle (Therapeutic Exercise)    Ankle (Therapeutic Exercise) AROM (active range of motion)  -LB     Ankle AROM (Therapeutic Exercise) bilateral; dorsiflexion; plantarflexion; sitting; 5 repetitions  -LB     Row Name             Wound 11/16/21 0743 midline sternal Incision    Wound - Properties Group Placement Date: 11/16/21  -TL Placement Time: 0743 -TL Orientation: midline  -TL Location: sternal  -TL Primary Wound Type: Incision  -TL     Retired Wound - Properties Group Date first assessed: 11/16/21  -TL Time first assessed: 0743 -TL Location: sternal  -TL Primary Wound Type: Incision  -TL     Row Name             Wound 11/16/21 0743 Right proximal leg  Incision    Wound - Properties Group Placement Date: 11/16/21  -TL Placement Time: 0743 -TL Side: Right  -TL Orientation: proximal  -TL Location: leg  -TL Primary Wound Type: Incision  -TL     Retired Wound - Properties Group Date first assessed: 11/16/21  -TL Time first assessed: 0743  -TL Side: Right  -TL Location: leg  -TL Primary Wound Type: Incision  -TL     Row Name 11/19/21 1100          Vital Signs    Pretreatment Heart Rate (beats/min) 95  -LB     Posttreatment Heart Rate (beats/min) 100  -LB     Row Name 11/19/21 1100          Positioning and Restraints    Post Treatment Position chair  -LB     In Chair sitting; call light within reach; with family/caregiver  -LB           User Key  (r) = Recorded By, (t) = Taken By, (c) = Cosigned By    Initials Name Provider Type    eTrri Finn PTA Physical Therapy Assistant    Nani Spears, RN Registered Nurse                Physical Therapy Education                 Title: PT OT SLP Therapies (In Progress)     Topic: Physical Therapy (In Progress)     Point: Mobility training (In Progress)     Learning Progress Summary           Patient Acceptance, E, NR by  at 11/19/2021 1101    Acceptance, E, VU by  at 11/18/2021 1452    Acceptance, E,D, DU by PC at 11/17/2021 0934                   Point: Home exercise program (Done)     Learning Progress Summary           Patient Acceptance, E, VU by CF at 11/18/2021 1452    Acceptance, E,D, DU by PC at 11/17/2021 0934                   Point: Body mechanics (Done)     Learning Progress Summary           Patient Acceptance, E, VU by  at 11/18/2021 1452    Acceptance, E,D, DU by PC at 11/17/2021 0934                   Point: Precautions (Done)     Learning Progress Summary           Patient Acceptance, E, VU by  at 11/18/2021 1452    Acceptance, E,D, DU by PC at 11/17/2021 0934                               User Key     Initials Effective Dates Name Provider Type Discipline    GUNNAR 03/07/18 -   Terri Luciano PTA Physical Therapy Assistant PT    PC 06/16/21 -  Chiara Roberson PT Physical Therapist PT    CF 06/16/21 -  Tori Mcclure PT Physical Therapist PT              PT Recommendation and Plan  Patient was wearing a face mask during this therapy encounter. Therapist used appropriate personal protective equipment including mask and gloves.  Mask used was standard procedure mask. Appropriate PPE was worn during the entire therapy session. Hand hygiene was completed before and after therapy session. Patient is not in enhanced droplet precautions.            Outcome Measures     Row Name 11/19/21 1100             How much help from another person do you currently need...    Turning from your back to your side while in flat bed without using bedrails? 3  -LB      Moving from lying on back to sitting on the side of a flat bed without bedrails? 3  -LB      Moving to and from a bed to a chair (including a wheelchair)? 3  -LB      Standing up from a chair using your arms (e.g., wheelchair, bedside chair)? 4  -LB      Climbing 3-5 steps with a railing? 2  -LB      To walk in hospital room? 3  -LB      AM-PAC 6 Clicks Score (PT) 18  -LB              Functional Assessment    Outcome Measure Options AM-PAC 6 Clicks Basic Mobility (PT)  -LB            User Key  (r) = Recorded By, (t) = Taken By, (c) = Cosigned By    Initials Name Provider Type    Terri Finn PTA Physical Therapy Assistant                 Time Calculation:    PT Charges     Row Name 11/19/21 1059             Time Calculation    Start Time 1035  -LB      Stop Time 1048  -LB      Time Calculation (min) 13 min  -LB            User Key  (r) = Recorded By, (t) = Taken By, (c) = Cosigned By    Initials Name Provider Type    Terri Finn PTA Physical Therapy Assistant              Therapy Charges for Today     Code Description Service Date Service Provider Modifiers Qty    [id number] HC PT THER PROC EA 15 MIN 11/19/2021  Terri Luciano, PTA GP 1          PT G-Codes  Outcome Measure Options: AM-PAC 6 Clicks Basic Mobility (PT)  AM-PAC 6 Clicks Score (PT): 18    Terri Luciano, PTA  11/19/2021

## 2021-11-19 NOTE — PLAN OF CARE
Goal Outcome Evaluation:  Plan of Care Reviewed With: patient        Progress: improving  Outcome Summary: SR on tele, on RA, other VSS, removed AV wires, no complications, lantus started today, ambulating with standby, had BM x2, wctm

## 2021-11-20 ENCOUNTER — READMISSION MANAGEMENT (OUTPATIENT)
Dept: CALL CENTER | Facility: HOSPITAL | Age: 71
End: 2021-11-20

## 2021-11-20 VITALS
SYSTOLIC BLOOD PRESSURE: 119 MMHG | WEIGHT: 177.5 LBS | RESPIRATION RATE: 16 BRPM | TEMPERATURE: 97.7 F | DIASTOLIC BLOOD PRESSURE: 68 MMHG | HEART RATE: 89 BPM | OXYGEN SATURATION: 97 % | BODY MASS INDEX: 25.41 KG/M2 | HEIGHT: 70 IN

## 2021-11-20 LAB
ANION GAP SERPL CALCULATED.3IONS-SCNC: 10.9 MMOL/L (ref 5–15)
BUN SERPL-MCNC: 20 MG/DL (ref 8–23)
BUN/CREAT SERPL: 27.8 (ref 7–25)
CALCIUM SPEC-SCNC: 8.6 MG/DL (ref 8.6–10.5)
CHLORIDE SERPL-SCNC: 100 MMOL/L (ref 98–107)
CO2 SERPL-SCNC: 26.1 MMOL/L (ref 22–29)
CREAT SERPL-MCNC: 0.72 MG/DL (ref 0.76–1.27)
DEPRECATED RDW RBC AUTO: 43.1 FL (ref 37–54)
ERYTHROCYTE [DISTWIDTH] IN BLOOD BY AUTOMATED COUNT: 12.4 % (ref 12.3–15.4)
GFR SERPL CREATININE-BSD FRML MDRD: 107 ML/MIN/1.73
GLUCOSE BLDC GLUCOMTR-MCNC: 246 MG/DL (ref 70–130)
GLUCOSE BLDC GLUCOMTR-MCNC: 309 MG/DL (ref 70–130)
GLUCOSE SERPL-MCNC: 219 MG/DL (ref 65–99)
HCT VFR BLD AUTO: 33.7 % (ref 37.5–51)
HGB BLD-MCNC: 11.5 G/DL (ref 13–17.7)
MCH RBC QN AUTO: 32 PG (ref 26.6–33)
MCHC RBC AUTO-ENTMCNC: 34.1 G/DL (ref 31.5–35.7)
MCV RBC AUTO: 93.9 FL (ref 79–97)
PLATELET # BLD AUTO: 198 10*3/MM3 (ref 140–450)
PMV BLD AUTO: 10 FL (ref 6–12)
POTASSIUM SERPL-SCNC: 3.8 MMOL/L (ref 3.5–5.2)
RBC # BLD AUTO: 3.59 10*6/MM3 (ref 4.14–5.8)
SODIUM SERPL-SCNC: 137 MMOL/L (ref 136–145)
WBC NRBC COR # BLD: 11.82 10*3/MM3 (ref 3.4–10.8)

## 2021-11-20 PROCEDURE — 97110 THERAPEUTIC EXERCISES: CPT | Performed by: PHYSICAL THERAPIST

## 2021-11-20 PROCEDURE — 63710000001 INSULIN GLARGINE PER 5 UNITS: Performed by: INTERNAL MEDICINE

## 2021-11-20 PROCEDURE — 85027 COMPLETE CBC AUTOMATED: CPT | Performed by: NURSE PRACTITIONER

## 2021-11-20 PROCEDURE — 63710000001 INSULIN LISPRO (HUMAN) PER 5 UNITS: Performed by: NURSE PRACTITIONER

## 2021-11-20 PROCEDURE — 80048 BASIC METABOLIC PNL TOTAL CA: CPT | Performed by: NURSE PRACTITIONER

## 2021-11-20 PROCEDURE — 82962 GLUCOSE BLOOD TEST: CPT

## 2021-11-20 PROCEDURE — 25010000002 ENOXAPARIN PER 10 MG: Performed by: NURSE PRACTITIONER

## 2021-11-20 RX ORDER — INSULIN GLARGINE 100 [IU]/ML
10 INJECTION, SOLUTION SUBCUTANEOUS NIGHTLY
Status: DISCONTINUED | OUTPATIENT
Start: 2021-11-20 | End: 2021-11-20 | Stop reason: HOSPADM

## 2021-11-20 RX ORDER — HYDROCODONE BITARTRATE AND ACETAMINOPHEN 5; 325 MG/1; MG/1
1 TABLET ORAL EVERY 4 HOURS PRN
Qty: 42 TABLET | Refills: 0 | Status: SHIPPED | OUTPATIENT
Start: 2021-11-20 | End: 2021-11-27

## 2021-11-20 RX ORDER — ASPIRIN 81 MG/1
81 TABLET ORAL DAILY
Qty: 30 TABLET | Refills: 2 | Status: SHIPPED | OUTPATIENT
Start: 2021-11-21 | End: 2022-02-19

## 2021-11-20 RX ORDER — INSULIN GLARGINE 100 [IU]/ML
5 INJECTION, SOLUTION SUBCUTANEOUS ONCE
Status: COMPLETED | OUTPATIENT
Start: 2021-11-20 | End: 2021-11-20

## 2021-11-20 RX ADMIN — LINAGLIPTIN 5 MG: 5 TABLET, FILM COATED ORAL at 10:02

## 2021-11-20 RX ADMIN — INSULIN GLARGINE 5 UNITS: 100 INJECTION, SOLUTION SUBCUTANEOUS at 10:02

## 2021-11-20 RX ADMIN — METOPROLOL TARTRATE 25 MG: 25 TABLET, FILM COATED ORAL at 10:02

## 2021-11-20 RX ADMIN — SCOLOPAMINE TRANSDERMAL SYSTEM 1 PATCH: 1 PATCH, EXTENDED RELEASE TRANSDERMAL at 10:06

## 2021-11-20 RX ADMIN — ENOXAPARIN SODIUM 40 MG: 40 INJECTION SUBCUTANEOUS at 10:21

## 2021-11-20 RX ADMIN — PANTOPRAZOLE SODIUM 40 MG: 40 TABLET, DELAYED RELEASE ORAL at 07:04

## 2021-11-20 RX ADMIN — ASPIRIN 81 MG: 81 TABLET, COATED ORAL at 10:02

## 2021-11-20 RX ADMIN — INSULIN LISPRO 10 UNITS: 100 INJECTION, SOLUTION INTRAVENOUS; SUBCUTANEOUS at 07:04

## 2021-11-20 RX ADMIN — GUAIFENESIN 1200 MG: 600 TABLET, EXTENDED RELEASE ORAL at 10:02

## 2021-11-20 RX ADMIN — DOCUSATE SODIUM 100 MG: 100 CAPSULE, LIQUID FILLED ORAL at 10:02

## 2021-11-20 NOTE — PLAN OF CARE
Goal Outcome Evaluation:              Outcome Summary: Pt is ambulating well with no AD.He ambualted 150 ft with PT after ambualtingin hallway with wife. He safely navigated stairs and is safe to d/c home. PT will sign off.

## 2021-11-20 NOTE — PROGRESS NOTES
[facility] to provide Home Care services. Patient agreeable. Contact information confirmed. Please call wife Sravanthi engle to schedule HH visit: [phone].

## 2021-11-20 NOTE — PROGRESS NOTES
Name: Thai Wu ADMIT: 2021   : 1950  PCP: Jairo Curtis MD    MRN: [id number] LOS: 8 days   AGE/SEX: 71 y.o. male  ROOM: [facility]   Subjective   No chief complaint on file.  cc- post op pain    Pain is fair  Better with rest  BG elevated   Mostly 200s but was higher this AM  Hospital food with a lot of carbs  On oral agents as outpatient      ROS  No f/c  No n/v  No cp/palp  No soa/cough    Objective   Vital Signs  Temp:  [97.4 °F (36.3 °C)-98.3 °F (36.8 °C)] 97.7 °F (36.5 °C)  Heart Rate:  [] 78  Resp:  [16] 16  BP: ()/(60-72) 108/67  SpO2:  [94 %-96 %] 96 %  on   ;   Device (Oxygen Therapy): room air  Body mass index is 25.47 kg/m².    Getting ready to take a shower  Post op changes to chest  Physical Exam  Constitutional:       General: He is not in acute distress.     Appearance: Normal appearance. He is not toxic-appearing.   HENT:      Head: Normocephalic and atraumatic.   Eyes:      General: No scleral icterus.  Pulmonary:      Effort: Pulmonary effort is normal. No respiratory distress.   Neurological:      Mental Status: He is alert.   Psychiatric:         Mood and Affect: Mood normal.         Behavior: Behavior normal.         Thought Content: Thought content normal.         Judgment: Judgment normal.         Results Review:       I reviewed the patient's new clinical results.  Results from last 7 days   Lab Units 21  0401   WBC 10*3/mm3 11.82* 11.35* 13.78* 14.09*   HEMOGLOBIN g/dL 11.5* 11.2* 11.6* 11.2*   PLATELETS 10*3/mm3 198 143 139* 131*     Results from last 7 days   Lab Units 210 21  0401   SODIUM mmol/L 137 136 137 141   POTASSIUM mmol/L 3.8 4.2 4.6 4.1   CHLORIDE mmol/L 100 103 104 109*   CO2 mmol/L 26.1 23.2 20.5* 20.8*   BUN mg/dL 20 20 20 21   CREATININE mg/dL 0.72* 0.67* 0.87 0.84   GLUCOSE mg/dL 219* 234* 235* 106*   Estimated Creatinine  Clearance: 93.6 mL/min (A) (by C-G formula based on SCr of 0.72 mg/dL (L)).  Results from last 7 days   Lab Units 11/17/21  0401 11/16/21  1446 11/16/21  1040 11/15/21  1004   ALBUMIN g/dL 4.60 5.00 4.30 4.00   BILIRUBIN mg/dL  --   --   --  0.3   ALK PHOS U/L  --   --   --  53   AST (SGOT) U/L  --   --   --  29   ALT (SGPT) U/L  --   --   --  47*     Results from last 7 days   Lab Units 11/20/21  0419 11/19/21  0340 11/18/21  0350 11/17/21  0401 11/16/21  1446 11/16/21  1446 11/16/21  1040 11/16/21  1040 11/16/21  0317 11/15/21  1004   CALCIUM mg/dL 8.6 8.8 8.5* 8.6   < > 8.5*   < > 8.7   < > 9.0   ALBUMIN g/dL  --   --   --  4.60  --  5.00  --  4.30  --  4.00   MAGNESIUM mg/dL  --   --   --  2.3  --  2.5*  --  3.0*  --   --    PHOSPHORUS mg/dL  --   --   --  4.1  --  2.7  --  1.8*  --   --     < > = values in this interval not displayed.         Coag   Results from last 7 days   Lab Units 11/17/21 0401 11/16/21  1040   INR  1.26* 1.31*   APTT seconds  --  28.4     HbA1C   Lab Results   Component Value Date    HGBA1C 8.11 (H) 11/12/2021     Infection     Radiology(recent) XR Chest 1 View    Result Date: 11/18/2021  FINDINGS AND IMPRESSION: Previously seen right internal jugular sheath and thoracostomy tubes overlying the mediastinum and left lung are no longer visualized. There are median sternotomy wires and presumed epicardial pacing wires.  The lungs are hypoinflated pulmonary opacification is present within the bilateral mid to lower lungs, grossly unchanged since chest CT performed 11/18/2021 at 4:12 AM. Possible tiny left apical pneumothorax resulting in approximately 3 mm of pleural parenchymal separation is present. This was discussed with Hai, the patient's nurse, at 11:45 AM 11/18/2021. Cardiac silhouette is accentuated by low lung volumes. Prominent bowel loops are present within the upper abdomen, as before.  This report was finalized on 11/18/2021 11:49 AM by Dr. Jean Hebert M.D.      No results  found for: TROPONINT, TROPONINI, BNP  No components found for: TSH;2    aspirin, 81 mg, Oral, Daily  atorvastatin, 40 mg, Oral, Nightly  chlorhexidine, 15 mL, Mouth/Throat, Q12H  docusate sodium, 100 mg, Oral, BID  enoxaparin, 40 mg, Subcutaneous, Daily  guaiFENesin, 1,200 mg, Oral, Q12H  insulin glargine, 10 Units, Subcutaneous, Nightly  insulin lispro, 0-14 Units, Subcutaneous, 4x Daily With Meals & Nightly  linagliptin, 5 mg, Oral, Daily  metoprolol tartrate, 25 mg, Oral, Q12H  mupirocin, , Each Nare, BID  pantoprazole, 40 mg, Intravenous, Once   Followed by  pantoprazole, 40 mg, Oral, QAM  Scopolamine, 1 patch, Transdermal, Q72H  senna-docusate sodium, 2 tablet, Oral, Nightly      clevidipine, 2-32 mg/hr  sodium chloride, 30 mL/hr, Last Rate: 30 mL/hr (11/16/21 1110)  sodium chloride, 30 mL/hr, Last Rate: 30 mL/hr (11/16/21 1109)    Diet Regular; Cardiac, Consistent Carbohydrate      Assessment/Plan      Active Hospital Problems    Diagnosis  POA   • Essential hypertension [I10]  Unknown   • Type 2 diabetes mellitus with circulatory disorder (HCC) [E11.59]  Unknown   • Scleroderma (HCC) [M34.9]  Unknown   • CAD (coronary artery disease) [I25.10]  Yes      Resolved Hospital Problems   No resolved problems to display.         71 y.o. male with history of diabetes and hypertension, transferred here with multivessel CAD and plans for CABG.     · DM2: On Tradjenta, will monitor for improvement. A1C 8.1.  Continue sliding scale insulin. At discharge reasonable to go back to his home glimepiride, janumet and follow up with Jairo Curtis MD as well as discussed better diet. Increase lantus now for better control here (but will not plan on lantus at discharge)    · Status post CABG: Continue low-dose Lopressor as tolerated, dose increased today. On aspirin/Lipitor as well.   · Leukocytosis: presumably reactive.   · Stool softeners, tolerating diet     · Discussed with patient and wife at bedside  · Lovenox  for DVT prophylaxis.    · Mobilize as tolerated        DW family    Willi Dewitt MD  [facility]  11/20/21  16:11 EST

## 2021-11-20 NOTE — PROGRESS NOTES
-NSTEMI, Severe multivessel CAD-s/p CABGx4 LIMA/rsvg--POD#3 Zaheer  -Diabetes type 2  -scleroderma-on plaquenil   -leukocytosis-- reactive  -Post op anemia- expected acute blood loss     Looks good this morning  On room air  Encourage pulmonary toilet  Mobilize   Okay for discharge home with home health  Routine care        Jordana Rosa, CLAUDIA  11/20/21  08:16 EST

## 2021-11-20 NOTE — PLAN OF CARE
Goal Outcome Evaluation:           POD 4 from CABG x4, VSS, no complaints voiced. Incisions with edges well approximated. No signs or symptoms of infection. Tolerating increased activity. Appropriate form discharge

## 2021-11-20 NOTE — THERAPY TREATMENT NOTE
Patient Name: Thai Wu  : 1950    MRN: [id number]                              Today's Date: 2021       Admit Date: 2021    Visit Dx:     ICD-10-CM ICD-9-CM   1. Coronary artery disease involving native coronary artery of native heart without angina pectoris  I25.10 414.01   2. S/P CABG (coronary artery bypass graft)  Z95.1 V45.81     Patient Active Problem List   Diagnosis   • Chest pain   • Non Q wave myocardial infarction (HCC)   • Coronary artery disease of native heart with stable angina pectoris (HCC)   • CAD (coronary artery disease)   • Essential hypertension   • Type 2 diabetes mellitus with circulatory disorder (HCC)   • Scleroderma (HCC)     Past Medical History:   Diagnosis Date   • Hypertension    • Scleroderma (HCC)    • Type 2 diabetes mellitus (HCC)      Past Surgical History:   Procedure Laterality Date   • CARDIAC CATHETERIZATION Left 2021    Procedure: Left Heart Cath;  Surgeon: Leroy Parker MD;  Location: [facility] INVASIVE LOCATION;  Service: Cardiology;  Laterality: Left;   • CORONARY ARTERY BYPASS GRAFT N/A 2021    Procedure: INTRAOP RICKI, MIDLINE STERNOTOMY, CORONARY ARTERY BYPASS GRAFTING X UTILIZING LEFT JAMES AND ENDOSCOPICALLY HARVESTED RIGHT GREATER SAPHENOUS VEIN AND PRP.;  Surgeon: Jr Lizandro Schwab MD;  Location: [facility];  Service: Cardiothoracic;  Laterality: N/A;   • HERNIA REPAIR        General Information     Row Name 21 1058          Physical Therapy Time and Intention    Document Type therapy note (daily note)  -HUONG     Mode of Treatment physical therapy  -HUONG           User Key  (r) = Recorded By, (t) = Taken By, (c) = Cosigned By    Initials Name Provider Type    Anne Richmond, PT Physical Therapist               Mobility     Row Name 21 1058          Bed Mobility    Comment (Bed Mobility) up in room, just walked with wife  -HUONG     Row Name 21 1058          Transfers    Comment (Transfers) NT up  in room  -[facility] Name 11/20/21 1058          Gait/Stairs (Locomotion)    Treasure Level (Gait) standby assist; contact guard  -     Assistive Device (Gait) walker, front-wheeled  -     Distance in Feet (Gait) 150  -KH     Deviations/Abnormal Patterns (Gait) gait speed decreased; stride length decreased  -     Treasure Level (Stairs) contact guard  -     Number of Steps (Stairs) 3  -KH     Ascending Technique (Stairs) step-to-step  -KH     Descending Technique (Stairs) step-to-step  -           User Key  (r) = Recorded By, (t) = Taken By, (c) = Cosigned By    Initials Name Provider Type    Anne Richmond, TED Physical Therapist               Obj/Interventions    No documentation.                Goals/Plan    No documentation.                Clinical Impression     [facility] Name 11/20/21 1059          Pain    Additional Documentation Pain Scale: Numbers Pre/Post-Treatment (Group)  -KH     Row Name 11/20/21 1059          Pain Scale: Numbers Pre/Post-Treatment    Pretreatment Pain Rating 0/10 - no pain  -     Posttreatment Pain Rating 0/10 - no pain  -KH     Row Name 11/20/21 1059          Plan of Care Review    Outcome Summary Pt is ambulating well with no AD.He ambualted 150 ft with PT after ambualtingin hallway with wife. He safely navigated stairs and is safe to d/c home. PT will sign off.  -[facility] Name 11/20/21 1059          Positioning and Restraints    Pre-Treatment Position standing in room  -     Post Treatment Position chair  -     In Chair sitting; call light within reach; with family/caregiver; encouraged to call for assist  -           User Key  (r) = Recorded By, (t) = Taken By, (c) = Cosigned By    Initials Name Provider Type    Anne Richmond, PT Physical Therapist               Outcome Measures     [facility] Name 11/20/21 1100          How much help from another person do you currently need...    Turning from your back to your side while in flat bed without  using bedrails? 4  -KH     Moving from lying on back to sitting on the side of a flat bed without bedrails? 4  -KH     Moving to and from a bed to a chair (including a wheelchair)? 4  -KH     Standing up from a chair using your arms (e.g., wheelchair, bedside chair)? 4  -KH     Climbing 3-5 steps with a railing? 3  -KH     To walk in hospital room? 4  -KH     AM-PAC 6 Clicks Score (PT) 23  -KH     Row Name 11/20/21 1100          Functional Assessment    Outcome Measure Options AM-PAC 6 Clicks Basic Mobility (PT)  -           User Key  (r) = Recorded By, (t) = Taken By, (c) = Cosigned By    Initials Name Provider Type    Anne Richmond, PT Physical Therapist                             Physical Therapy Education                 Title: PT OT SLP Therapies (Done)     Topic: Physical Therapy (Done)     Point: Mobility training (Done)     Learning Progress Summary           Patient Acceptance, E,TB, VU by TB at 11/19/2021 1830    Acceptance, E, NR by LB at 11/19/2021 1101    Acceptance, E, VU by CF at 11/18/2021 1452    Acceptance, E,D, DU by PC at 11/17/2021 0934                   Point: Home exercise program (Done)     Learning Progress Summary           Patient Acceptance, E,TB, VU by TB at 11/19/2021 1830    Acceptance, E, VU by CF at 11/18/2021 1452    Acceptance, E,D, DU by PC at 11/17/2021 0934                   Point: Body mechanics (Done)     Learning Progress Summary           Patient Acceptance, E,TB, VU by TB at 11/19/2021 1830    Acceptance, E, VU by CF at 11/18/2021 1452    Acceptance, E,D, DU by PC at 11/17/2021 0934                   Point: Precautions (Done)     Learning Progress Summary           Patient Acceptance, E,TB, VU by TB at 11/19/2021 1830    Acceptance, E, VU by CF at 11/18/2021 1452    Acceptance, E,D, DU by PC at 11/17/2021 0934                               User Key     Initials Effective Dates Name Provider Type Discipline     04/09/21 -  Mo Meier, RN Registered Nurse  Nurse    LB 03/07/18 -  Terri Luciano PTA Physical Therapy Assistant PT    PC 06/16/21 -  Chiara Roberson PT Physical Therapist PT    CF 06/16/21 -  Tori Mcclure PT Physical Therapist PT              PT Recommendation and Plan     Outcome Summary: Pt is ambulating well with no AD.He ambualted 150 ft with PT after ambualtingin hallway with wife. He safely navigated stairs and is safe to d/c home. PT will sign off.     Time Calculation:    PT Charges     Row Name 11/20/21 1058             Time Calculation    Start Time 1040  -KH      Stop Time 1048  -KH      Time Calculation (min) 8 min  -KH      PT Received On 11/20/21  -      PT - Next Appointment 11/21/21  -KH              Time Calculation- PT    Total Timed Code Minutes- PT 8 minute(s)  -KH              Timed Charges    08916 - PT Therapeutic Exercise Minutes 8  -KH              Total Minutes    Timed Charges Total Minutes 8  -KH       Total Minutes 8  -KH            User Key  (r) = Recorded By, (t) = Taken By, (c) = Cosigned By    Initials Name Provider Type    Anne Richmond, PT Physical Therapist              Therapy Charges for Today     Code Description Service Date Service Provider Modifiers Qty    79307579915 HC PT THER PROC EA 15 MIN 11/20/2021 Anne Wallis, PT GP 1          PT G-Stephanie  Outcome Measure Options: AM-PAC 6 Clicks Basic Mobility (PT)  AM-PAC 6 Clicks Score (PT): 23    Anne Wallis, PT  11/20/2021

## 2021-11-21 NOTE — OUTREACH NOTE
Prep Survey      Responses   [facility] facility patient discharged from? [place]   Is LACE score < 7 ? No   Emergency Room discharge w/ pulse ox? No   Eligibility Readm Mgmt   Discharge diagnosis CAD   Does the patient have one of the following disease processes/diagnoses(primary or secondary)? Cardiothoracic surgery   Does the patient have Home health ordered? Yes   What is the Home health agency?  [facility]   Is there a DME ordered? No   Prep survey completed? Yes          Ariela Marin RN

## 2021-11-22 ENCOUNTER — READMISSION MANAGEMENT (OUTPATIENT)
Dept: CALL CENTER | Facility: HOSPITAL | Age: 71
End: 2021-11-22

## 2021-11-22 ENCOUNTER — HOME HEALTH ADMISSION (OUTPATIENT)
Dept: HOME HEALTH SERVICES | Facility: HOME HEALTHCARE | Age: 71
End: 2021-11-22

## 2021-11-22 NOTE — CASE MANAGEMENT/SOCIAL WORK
Case Management Discharge Note      Final Note: Pt discharged home from [facility] on 11/20/2021.  Pt scheduled to be followed by [facility].....SS/CCP    Provided Post Acute Provider List?: Yes  Post Acute Provider List: Home Health  Delivered To: Patient  Method of Delivery: In person    Selected Continued Care - Discharged on 11/20/2021 Admission date: 11/12/2021 - Discharge disposition: Home-Health Care c    Destination    No services have been selected for the patient.              Durable Medical Equipment    No services have been selected for the patient.              Dialysis/Infusion    No services have been selected for the patient.              Home Medical Care Coordination complete.    Service Provider Selected Services Address Phone Fax Patient Preferred    [facility]  Home Health Services [address], [facility] [address] [phone] --          Therapy    No services have been selected for the patient.              Community Resources    No services have been selected for the patient.              Community & DME    No services have been selected for the patient.                       Final Discharge Disposition Code: 06 - home with home health care

## 2021-11-22 NOTE — OUTREACH NOTE
CT Surgery Week 1 Survey      Responses   [facility] patient discharged from? [place]   Does the patient have one of the following disease processes/diagnoses(primary or secondary)? Cardiothoracic surgery   Week 1 attempt successful? Yes   Call start time 1422   Call end time 1427   Discharge diagnosis CAD   Meds reviewed with patient/caregiver? Yes   Is the patient having any side effects they believe may be caused by any medication additions or changes? No   Does the patient have all medications related to this admission filled (includes all antibiotics, pain medications, cardiac medications, etc.) Yes   Is the patient taking all medications as directed (includes completed medication regime)? Yes   Does the patient have a primary care provider?  Yes   Does the patient have an appointment scheduled with their C/T surgeon? No   What is preventing the patient from scheduling follow up appointments? Haven't had time   Nursing Interventions Advised patient to make appointment   Has the patient kept scheduled appointments due by today? N/A   What is the Home health agency?  [facility] could not visit patient so they informed him they referred him to [place]    Has home health visited the patient within 72 hours of discharge? Call prior to 72 hours   Psychosocial issues? No   Did the patient receive a copy of their discharge instructions? Yes   Nursing interventions Reviewed instructions with patient   What is the patient's perception of their health status since discharge? Improving   Nursing interventions Nurse provided patient education   Is the patient /caregiver able to teach back basic post-op care? Drive as instructed by MD in discharge instructions,  Lifting as instructed by MD in discharge instructions   Is the patient/caregiver able to teach back signs and symptoms of incisional infection? Increased drainage or bleeding,  Increased redness, swelling or pain at the incisonal site,  Fever   Is the  patient/caregiver able to teach back steps to recovery at home? Set small, achievable goals for return to baseline health,  Rest and rebuild strength, gradually increase activity,  Eat a well-balance diet   Is the patient/caregiver able to teach back the hierarchy of who to call/visit for symptoms/problems? PCP, Specialist, Home health nurse, Urgent Care, ED, 911 Yes   Week 1 call completed? Yes            Barbie Roberts RN

## 2021-11-22 NOTE — DISCHARGE SUMMARY
Date of Admission:   Date of Discharge:  11/22/2021    Discharge Diagnosis:     -NSTEMI, Severe multivessel CAD-s/p CABGx4 LIMA/rsvg   -Diabetes type 2  -scleroderma-on plaquenil   -leukocytosis-- reactive  -Post op anemia- expected acute blood loss  Presenting Problem/History of Present Illness  CAD (coronary artery disease) [I25.10]     Hospital Course  Patient is a 71 y.o. male who was transferred to [facility] after presenting to [facility] with a NSTEMI he was transferred for cardiac surgery.  On 11/16 he underwent CABG x4.  Skeletonized LIMA x2 to diagonal branch and then to mid LAD.  Vein graft to ramus intermedius.  Vein graft OM1.  Temporary cardiopulmonary bypass.  Antegrade cold blood cardioplegia.  Neurologic monitoring.  Transesophageal echo.  Endoscopic vein harvest of the right greater saphenous vein.  Left posterior pericardial window by Dr. Schwab (see op report for full details).  Post operatively he did well. Tolerated medication therapy. Weaned from supplemental oxygen.  All lines tubes and wires removed without issue.  Adequate PO intake. Urinating and defecating normally.  Met PT goals.  On post operative day 4 he was deemed ready for discharge home with home health. Patient was provided with appropriate discharge education. He  was instructed to call office with any questions or concerns.      Procedures Performed  Procedure(s):  INTRAOP RICKI, MIDLINE STERNOTOMY, CORONARY ARTERY BYPASS GRAFTING X UTILIZING LEFT JAMES AND ENDOSCOPICALLY HARVESTED RIGHT GREATER SAPHENOUS VEIN AND PRP.       Consults:   Consults     Date and Time Order Name Status Description    11/13/2021  6:24 AM Inpatient Internal Medicine Consult Completed     11/10/2021  7:29 AM Inpatient Cardiology Consult Completed           Pertinent Test Results:    Lab Results   Component Value Date    WBC 11.82 (H) 11/20/2021    HGB 11.5 (L) 11/20/2021    HCT 33.7 (L) 11/20/2021    MCV 93.9 11/20/2021     11/20/2021      Lab Results    Component Value Date    GLUCOSE 219 (H) 11/20/2021    CALCIUM 8.6 11/20/2021     11/20/2021    K 3.8 11/20/2021    CO2 26.1 11/20/2021     11/20/2021    BUN 20 11/20/2021    CREATININE 0.72 (L) 11/20/2021    EGFRIFNONA 107 11/20/2021    BCR 27.8 (H) 11/20/2021    ANIONGAP 10.9 11/20/2021     Lab Results   Component Value Date    INR 1.26 (H) 11/17/2021    PROTIME 15.6 (H) 11/17/2021         Condition on Discharge:  good    Vital Signs         Discharge Disposition  Home-Health Care [facility]    Discharge Medications     Discharge Medications      New Medications      Instructions Start Date   aspirin 81 MG EC tablet  Replaces: aspirin 81 MG chewable tablet   81 mg, Oral, Daily      HYDROcodone-acetaminophen 5-325 MG per tablet  Commonly known as: NORCO   1 tablet, Oral, Every 4 Hours PRN      metoprolol tartrate 25 MG tablet  Commonly known as: LOPRESSOR   25 mg, Oral, Every 12 Hours Scheduled         Continue These Medications      Instructions Start Date   glimepiride 2 MG tablet  Commonly known as: AMARYL   2 mg, Oral, Every Morning Before Breakfast      hydroxychloroquine 200 MG tablet  Commonly known as: PLAQUENIL   200 mg, Oral, Nightly      multivitamin with minerals tablet tablet   1 tablet, Oral, Daily      simvastatin 20 MG tablet  Commonly known as: ZOCOR   simvastatin 20 mg tablet      sitaGLIPtin-metFORMIN  MG per tablet  Commonly known as: JANUMET   1 tablet, Oral, 2 Times Daily With Meals         Stop These Medications    aspirin 81 MG chewable tablet  Replaced by: aspirin 81 MG EC tablet     carvedilol 3.125 MG tablet  Commonly known as: Coreg     enoxaparin 80 MG/0.8ML solution syringe  Commonly known as: LOVENOX     lisinopril 5 MG tablet  Commonly known as: PRINIVIL,ZESTRIL            Discharge Diet:     Activity at Discharge:  1. No driving for 2 weeks and off narcotic pain medications.  2. Shower daily. Clean incisions with warm water and antibacterial soap only. Do not put any  lotion or ointments on incisions.  3. Ambulate for 10 minutes at least 3 times a day.  4. No heavy lifting > 10lbs until seen in office.   5. Take all medications as prescribed.       Follow-up Appointments  No future appointments.  Additional Instructions for the Follow-ups that You Need to Schedule     Ambulatory Referral to Cardiac Rehab   As directed      Ambulatory Referral to Home Health   As directed      Face to Face Visit Date: 11/20/2021    Follow-up provider for Plan of Care?: I will be treating the patient on an ongoing basis.  Please send me the Plan of Care for signature.    Follow-up provider: JR KWABENA COMBS [[id number]]    Reason/Clinical Findings: post op open heart    Describe mobility limitations that make leaving home difficult: weakness    Nursing/Therapeutic Services Requested: Skilled Nursing    Skilled nursing orders: Post CABG care    Frequency: 1 Week 1         Call MD With Problems / Concerns   As directed      Instructions:  Call office at [phone] for any drainage, increased redness, or fever over 100.5    Order Comments: Instructions:  Call office at [phone] for any drainage, increased redness, or fever over 100.5          Discharge Follow-up with PCP   As directed       Currently Documented PCP:    Jairo Curtis MD    PCP Phone Number:    [phone]     Follow Up Details: in 1 week         Discharge Follow-up with Specialty: Cardiologist APRN/PA; 1 Week   As directed      Specialty: Cardiologist APRN/PA    Follow Up: 1 Week    Follow Up Details: bring all prescription bottles to appointment, call for appointment         Discharge Follow-up with Specified Provider: Cardiologist; 1 Month   As directed      To: Cardiologist    Follow Up: 1 Month    Follow Up Details: call for appointment, bring all medication bottles to appointment         Discharge Follow-up with Specified Provider: Dr. Combs's office; 1 Month   As directed      To: Dr. Combs's office     Follow Up: 1 Month    Follow Up Details: 4-6 weeks, bring all current medications to appointment               Test Results Pending at Discharge       CLAUDIA Flores  11/22/21  07:59 EST

## 2021-11-24 ENCOUNTER — TELEPHONE (OUTPATIENT)
Dept: CARDIAC SURGERY | Facility: CLINIC | Age: 71
End: 2021-11-24

## 2021-11-24 NOTE — TELEPHONE ENCOUNTER
Spoke with 's wife Sravanthi , Sravanthi wanted to clarify cardiology APRN,    was seen in the hospital by  and had cardiac cath. Advised her to call 's office for 1 week post op appt. Sravanthi also wanted to check and see if appt on 12/22 could be switched to a video visit as they live 2.5-3hrs away. Advised I would discuss with Adriano TAYLOR and touch base next week. She verbalized understanding and this was agreeable.       ----- Message from Megan Melgar sent at 11/24/2021 11:34 AM EST -----  PT'S WIFE CALLED AND IS CONFUSED ABOUT POST OP APPTS. THE DISCHARGE NOTES SAY SHE IS TO SEE A CARDIOLOGIST'S APRN IN 1 WEEK BUT SHE THINKS IT IS 1 OF OURS. DOES HE SEE CARDIOLOGY HERE OR IN [place]? ALSO HIS POST OP APPT I SCHEDULED WITH ADRIANO CAN IT BE A VIDEO VISIT?     [phone]    OK TO CALL THIS AFTERNOON WIFE WAS GOING TO RUN AN ERRAND     THANKS

## 2021-12-01 ENCOUNTER — READMISSION MANAGEMENT (OUTPATIENT)
Dept: CALL CENTER | Facility: HOSPITAL | Age: 71
End: 2021-12-01

## 2021-12-01 NOTE — OUTREACH NOTE
CT Surgery Week 2 Survey      Responses   [facility] patient discharged from? [place]   Does the patient have one of the following disease processes/diagnoses(primary or secondary)? Cardiothoracic surgery   Week 2 attempt successful? No   Unsuccessful attempts Attempt 1          Shantal Xiao RN

## 2021-12-02 ENCOUNTER — DOCUMENTATION (OUTPATIENT)
Dept: CARDIAC REHAB | Facility: HOSPITAL | Age: 71
End: 2021-12-02

## 2021-12-06 ENCOUNTER — READMISSION MANAGEMENT (OUTPATIENT)
Dept: CALL CENTER | Facility: HOSPITAL | Age: 71
End: 2021-12-06

## 2021-12-06 NOTE — OUTREACH NOTE
CT Surgery Week 2 Survey      Responses   [facility] [place] by [facility] patient discharged from? [place]   Does the patient have one of the following disease processes/diagnoses(primary or secondary)? Stroke (TIA)   Week 2 attempt successful? No   Unsuccessful attempts Attempt 2          [place] Arceo RN

## 2021-12-08 ENCOUNTER — HOSPITAL ENCOUNTER (OUTPATIENT)
Dept: CARDIAC REHAB | Facility: HOSPITAL | Age: 71
Setting detail: THERAPIES SERIES
Discharge: HOME | End: 2021-12-08

## 2021-12-08 VITALS
SYSTOLIC BLOOD PRESSURE: 116 MMHG | HEART RATE: 75 BPM | HEIGHT: 70 IN | DIASTOLIC BLOOD PRESSURE: 64 MMHG | WEIGHT: 174 LBS | BODY MASS INDEX: 24.91 KG/M2

## 2021-12-08 DIAGNOSIS — Z95.1 S/P CABG X 4: Primary | ICD-10-CM

## 2021-12-08 PROCEDURE — 93798 PHYS/QHP OP CAR RHAB W/ECG: CPT

## 2021-12-08 NOTE — PROGRESS NOTES
Cardiac Rehab Initial Assessment      Name: Thai Wu  :1950 Allergies:Patient has no known allergies.   MRN: [id number] 71 y.o. Physician: Jairo Curtis MD   Primary Diagnosis:    Diagnosis Plan   1. S/P CABG x 4      Event Date: 21   Specialist: Julia Parker   Secondary Diagnosis: none Risk Stratification:Low Risk Note Author: Deisy Gardner RN     Cardiovascular History: none     EXERCISE AT HOME  yes  Walks up to 2 miles daily. Increasing as tolerated  EF: 56-60%      Source: echo          Ambulatory Status:Independent  Ambulatory Fall Risk Assessed on Initial Visit: yes 6 Minute Walk Pre- Cardiac Rehab:  Distance:1188ft      RPE:6  Max. HR: 86       SPO2:97    MET: 3  MPH: 2.2             RPD: 0  Resting BP: 116/64 LA, 109/62 RA    Peak BP: 135/65  Recovery BP: 135/70        NUTRITION  Lipids:yes If yes, labs as follows;  Total: No components found for: CHOLESTEROL  HDL:   HDL Cholesterol   Date Value Ref Range Status   2021 58 40 - 60 mg/dL Final    Lipids continued:  LDL:  LDL Cholesterol    Date Value Ref Range Status   2021 51 0 - 100 mg/dL Final     Triglyceride: No components found for: TRIGLYCERIDE   Weight Management:                 Weight: 174  Height: 70                                   BMI: Body mass index is 24.97 kg/m².  Waist Circumference: 37  inches   Alcohol Use: none Diabetes:Yes,  Monitors BS at home- yes, Frequency: 1 time daily, Random BS: 185    Last HGBA1C with date if applicable:No components found for: A1C         SOCIAL HISTORY  Social History     Socioeconomic History   • Marital status:    Tobacco Use   • Smoking status: Never Smoker   • Smokeless tobacco: Former User   Substance and Sexual Activity   • Alcohol use: Yes     Comment: socially   • Drug use: Never   • Sexual activity: Defer       Educational Level (choose one that applies) high school diploma/GED Learning Barriers:Ready to Learn    Family  Support:yes    Living Arrangement: lives with their family         Tobacco Adjunct: N/A             PSYCHOSOCIAL  Clinical Depression: no    Stress: no     Assess presence or absence of depression using a valid screening tool: yes      PHYSICAL ASSESSMENT    wnl          Angina: no    Denies incisional or any other pain today Diagnosed with Hypertension:no    Heart Sounds: wnl     Lung Sounds: normal air entry, lungs clear to auscultation         Assessment: wnl Orthopedic Problems: knees and shoulder    Are you being hurt, hit, or frightened by anyone at home or in your life? no    Are you being neglected by a caregiver? N/A        Assessment: wnl    Family attends IA: no Time of arrival: 1030  Time of departure: 1130     Patient Goals: increase strength and stamina         12/8/2021  12:55 CST  Deisy Gardner RN

## 2021-12-10 ENCOUNTER — HOSPITAL ENCOUNTER (OUTPATIENT)
Dept: CARDIAC REHAB | Facility: HOSPITAL | Age: 71
Setting detail: THERAPIES SERIES
Discharge: HOME | End: 2021-12-10

## 2021-12-10 VITALS — SYSTOLIC BLOOD PRESSURE: 120 MMHG | HEART RATE: 92 BPM | DIASTOLIC BLOOD PRESSURE: 64 MMHG

## 2021-12-10 DIAGNOSIS — Z95.1 S/P CABG X 4: Primary | ICD-10-CM

## 2021-12-10 PROCEDURE — 93798 PHYS/QHP OP CAR RHAB W/ECG: CPT

## 2021-12-13 ENCOUNTER — HOSPITAL ENCOUNTER (OUTPATIENT)
Dept: CARDIAC REHAB | Facility: HOSPITAL | Age: 71
Setting detail: THERAPIES SERIES
Discharge: HOME | End: 2021-12-13

## 2021-12-13 VITALS
BODY MASS INDEX: 25.11 KG/M2 | DIASTOLIC BLOOD PRESSURE: 62 MMHG | WEIGHT: 175 LBS | HEART RATE: 84 BPM | SYSTOLIC BLOOD PRESSURE: 108 MMHG

## 2021-12-13 DIAGNOSIS — Z95.1 S/P CABG X 4: Primary | ICD-10-CM

## 2021-12-13 PROCEDURE — 93798 PHYS/QHP OP CAR RHAB W/ECG: CPT

## 2021-12-14 ENCOUNTER — READMISSION MANAGEMENT (OUTPATIENT)
Dept: CALL CENTER | Facility: HOSPITAL | Age: 71
End: 2021-12-14

## 2021-12-14 NOTE — OUTREACH NOTE
CT Surgery Week 3 Survey      Responses   [facility] patient discharged from? [place]   Does the patient have one of the following disease processes/diagnoses(primary or secondary)? Cardiothoracic surgery   Week 3 attempt successful? Yes   Call start time 1759   Call end time 1805   Discharge diagnosis CAD   Meds reviewed with patient/caregiver? Yes   Is the patient having any side effects they believe may be caused by any medication additions or changes? No   Does the patient have all medications related to this admission filled (includes all antibiotics, pain medications, cardiac medications, etc.) Yes   Is the patient taking all medications as directed (includes completed medication regime)? Yes   Does the patient have a primary care provider?  Yes   Does the patient have an appointment scheduled with their C/T surgeon? Yes   Has the patient kept scheduled appointments due by today? Yes   Has home health visited the patient within 72 hours of discharge? N/A   Psychosocial issues? Yes   Psychosocial comments Has no power due to faina, has generator   Did the patient receive a copy of their discharge instructions? Yes   Nursing interventions Reviewed instructions with patient   What is the patient's perception of their health status since discharge? Improving   Nursing interventions Nurse provided patient education   Is the patient /caregiver able to teach back basic post-op care? Keep incision areas clean, dry and protected,  Lifting as instructed by MD in discharge instructions   Is the patient/caregiver able to teach back signs and symptoms of incisional infection? Increased redness, swelling or pain at the incisonal site,  Increased drainage or bleeding,  Incisional warmth,  Pus or odor from incision,  Fever   Is the patient/caregiver able to teach back steps to recovery at home? Set small, achievable goals for return to baseline health,  Rest and rebuild strength, gradually increase activity,  Eat  a well-balance diet   Is the patient /caregiver able to teach back the importance of cardiac rehab? Yes   If the patient is a current smoker, are they able to teach back resources for cessation? Not a smoker   Is the patient/caregiver able to teach back the hierarchy of who to call/visit for symptoms/problems? PCP, Specialist, Home health nurse, Urgent Care, ED, 911 Yes   Additional teach back comments pt states card rehab going well, wounds healed   Week 3 call completed? Yes          Hayde Osorio RN

## 2021-12-15 ENCOUNTER — HOSPITAL ENCOUNTER (OUTPATIENT)
Dept: CARDIAC REHAB | Facility: HOSPITAL | Age: 71
Setting detail: THERAPIES SERIES
Discharge: HOME | End: 2021-12-15

## 2021-12-15 VITALS — HEART RATE: 81 BPM | SYSTOLIC BLOOD PRESSURE: 111 MMHG | DIASTOLIC BLOOD PRESSURE: 66 MMHG

## 2021-12-15 DIAGNOSIS — Z95.1 S/P CABG X 4: Primary | ICD-10-CM

## 2021-12-15 PROCEDURE — 93798 PHYS/QHP OP CAR RHAB W/ECG: CPT

## 2021-12-17 ENCOUNTER — HOSPITAL ENCOUNTER (OUTPATIENT)
Dept: CARDIAC REHAB | Facility: HOSPITAL | Age: 71
Setting detail: THERAPIES SERIES
Discharge: HOME | End: 2021-12-17

## 2021-12-17 VITALS — HEART RATE: 79 BPM | DIASTOLIC BLOOD PRESSURE: 64 MMHG | SYSTOLIC BLOOD PRESSURE: 115 MMHG

## 2021-12-17 DIAGNOSIS — Z95.1 S/P CABG X 4: Primary | ICD-10-CM

## 2021-12-17 PROCEDURE — 93798 PHYS/QHP OP CAR RHAB W/ECG: CPT

## 2021-12-20 ENCOUNTER — HOSPITAL ENCOUNTER (OUTPATIENT)
Dept: CARDIAC REHAB | Facility: HOSPITAL | Age: 71
Setting detail: THERAPIES SERIES
Discharge: HOME | End: 2021-12-20

## 2021-12-20 VITALS
SYSTOLIC BLOOD PRESSURE: 104 MMHG | DIASTOLIC BLOOD PRESSURE: 60 MMHG | HEART RATE: 79 BPM | WEIGHT: 175 LBS | BODY MASS INDEX: 25.11 KG/M2

## 2021-12-20 DIAGNOSIS — Z95.1 S/P CABG X 4: Primary | ICD-10-CM

## 2021-12-20 PROCEDURE — 93798 PHYS/QHP OP CAR RHAB W/ECG: CPT

## 2021-12-22 ENCOUNTER — APPOINTMENT (OUTPATIENT)
Dept: CARDIAC REHAB | Facility: HOSPITAL | Age: 71
End: 2021-12-22

## 2021-12-22 ENCOUNTER — TELEMEDICINE (OUTPATIENT)
Dept: CARDIAC SURGERY | Facility: CLINIC | Age: 71
End: 2021-12-22

## 2021-12-22 VITALS
HEART RATE: 81 BPM | SYSTOLIC BLOOD PRESSURE: 102 MMHG | BODY MASS INDEX: 24.54 KG/M2 | DIASTOLIC BLOOD PRESSURE: 69 MMHG | WEIGHT: 171.4 LBS | HEIGHT: 70 IN

## 2021-12-22 DIAGNOSIS — Z95.1 S/P CABG X 4: Primary | ICD-10-CM

## 2021-12-22 PROCEDURE — 99024 POSTOP FOLLOW-UP VISIT: CPT | Performed by: NURSE PRACTITIONER

## 2021-12-22 NOTE — PROGRESS NOTES
"CARDIOVASCULAR SURGERY FOLLOW-UP PROGRESS NOTE  Chief Complaint: Postop follow-up        HPI:   Dear Jairo Corbett MD and colleagues:    It was nice to see Thai Wu in follow up 5 weeks after surgery.  As you know, he is a 71 y.o. male with CAD who underwent CABG x4 with LIMA on 11/16/2021 with Dr. Schwab. He did well postoperatively and continues to do well. He evaluated today complaining of nothing.  His activity level has been good.  From a surgical standpoint, the sternal incision is well approximated without erythema, edema, or drainage.  The patient denies any popping or clicking with deep inspiration or coughing.      Physical Exam:         /69 (BP Location: Right arm, Patient Position: Sitting, Cuff Size: Adult)   Pulse 81   Ht 177.8 cm (70\")   Wt 77.7 kg (171 lb 6.4 oz)   BMI 24.59 kg/m²     Respirations deep and easy, no dyspnea with conversation  Extremities:  no edema  Incision(s):  mid chest healing well, right leg healing well, sternum stable    Assessment/Plan:     S/P CABG. Overall, he is doing well.    No significant post-op complications    No heavy lifting > 10 pounds for 1 more weeks  Keep incisions clean and dry  OK to begin cardiac rehab  Follow-up as scheduled with cardiology  Follow-up with CT surgery prn    Continue lifting restriction of 10 lbs until 8 weeks and 50 lbs until 12 weeks from the date of surgery, no excessive jarring motions or twisting motions until 12 weeks from the date of surgery    Return to clinic if any signs or symptoms of infection or sternal instability develop       Thank you for allowing me to participate in the care of your patient.    Regards,  CLAUDIA Carmen    The use of a video visit has been reviewed with the patient and verbal informed consent has been obtained.  **Assessment and discussion obtained via video in lieu of face to face office visit due to COVID19 pandemic  "

## 2021-12-27 ENCOUNTER — HOSPITAL ENCOUNTER (OUTPATIENT)
Dept: CARDIAC REHAB | Facility: HOSPITAL | Age: 71
Setting detail: THERAPIES SERIES
Discharge: HOME | End: 2021-12-27

## 2021-12-27 VITALS
WEIGHT: 176 LBS | HEART RATE: 74 BPM | DIASTOLIC BLOOD PRESSURE: 61 MMHG | BODY MASS INDEX: 25.25 KG/M2 | SYSTOLIC BLOOD PRESSURE: 111 MMHG

## 2021-12-27 DIAGNOSIS — Z95.1 S/P CABG X 4: Primary | ICD-10-CM

## 2021-12-27 PROCEDURE — 93798 PHYS/QHP OP CAR RHAB W/ECG: CPT

## 2021-12-29 ENCOUNTER — HOSPITAL ENCOUNTER (OUTPATIENT)
Dept: CARDIAC REHAB | Facility: HOSPITAL | Age: 71
Setting detail: THERAPIES SERIES
Discharge: HOME | End: 2021-12-29

## 2021-12-29 VITALS — DIASTOLIC BLOOD PRESSURE: 61 MMHG | HEART RATE: 82 BPM | SYSTOLIC BLOOD PRESSURE: 119 MMHG

## 2021-12-29 DIAGNOSIS — Z95.1 S/P CABG X 4: Primary | ICD-10-CM

## 2021-12-29 PROCEDURE — 93798 PHYS/QHP OP CAR RHAB W/ECG: CPT

## 2022-01-03 ENCOUNTER — HOSPITAL ENCOUNTER (OUTPATIENT)
Dept: CARDIAC REHAB | Facility: HOSPITAL | Age: 72
Setting detail: THERAPIES SERIES
Discharge: HOME | End: 2022-01-03

## 2022-01-03 VITALS
HEART RATE: 71 BPM | DIASTOLIC BLOOD PRESSURE: 57 MMHG | BODY MASS INDEX: 26.54 KG/M2 | SYSTOLIC BLOOD PRESSURE: 103 MMHG | WEIGHT: 185 LBS

## 2022-01-03 DIAGNOSIS — Z95.1 S/P CABG X 4: Primary | ICD-10-CM

## 2022-01-03 PROCEDURE — 93798 PHYS/QHP OP CAR RHAB W/ECG: CPT

## 2022-01-05 ENCOUNTER — HOSPITAL ENCOUNTER (OUTPATIENT)
Dept: CARDIAC REHAB | Facility: HOSPITAL | Age: 72
Setting detail: THERAPIES SERIES
Discharge: HOME | End: 2022-01-05

## 2022-01-05 VITALS — DIASTOLIC BLOOD PRESSURE: 57 MMHG | HEART RATE: 82 BPM | SYSTOLIC BLOOD PRESSURE: 104 MMHG

## 2022-01-05 DIAGNOSIS — Z95.1 S/P CABG X 4: Primary | ICD-10-CM

## 2022-01-05 PROCEDURE — 93798 PHYS/QHP OP CAR RHAB W/ECG: CPT

## 2022-01-10 ENCOUNTER — HOSPITAL ENCOUNTER (OUTPATIENT)
Dept: CARDIAC REHAB | Facility: HOSPITAL | Age: 72
Setting detail: THERAPIES SERIES
Discharge: HOME | End: 2022-01-10

## 2022-01-10 VITALS
HEART RATE: 77 BPM | WEIGHT: 179 LBS | SYSTOLIC BLOOD PRESSURE: 98 MMHG | BODY MASS INDEX: 25.68 KG/M2 | DIASTOLIC BLOOD PRESSURE: 62 MMHG

## 2022-01-10 DIAGNOSIS — Z95.1 S/P CABG X 4: Primary | ICD-10-CM

## 2022-01-10 PROCEDURE — 93798 PHYS/QHP OP CAR RHAB W/ECG: CPT

## 2022-01-12 ENCOUNTER — APPOINTMENT (OUTPATIENT)
Dept: CARDIAC REHAB | Facility: HOSPITAL | Age: 72
End: 2022-01-12

## 2022-01-14 ENCOUNTER — HOSPITAL ENCOUNTER (OUTPATIENT)
Dept: CARDIAC REHAB | Facility: HOSPITAL | Age: 72
Setting detail: THERAPIES SERIES
Discharge: HOME | End: 2022-01-14

## 2022-01-14 VITALS — SYSTOLIC BLOOD PRESSURE: 111 MMHG | HEART RATE: 80 BPM | DIASTOLIC BLOOD PRESSURE: 58 MMHG

## 2022-01-14 DIAGNOSIS — Z95.1 S/P CABG X 4: Primary | ICD-10-CM

## 2022-01-14 PROCEDURE — 93798 PHYS/QHP OP CAR RHAB W/ECG: CPT

## 2022-01-17 ENCOUNTER — HOSPITAL ENCOUNTER (OUTPATIENT)
Dept: CARDIAC REHAB | Facility: HOSPITAL | Age: 72
Setting detail: THERAPIES SERIES
Discharge: HOME | End: 2022-01-17

## 2022-01-17 VITALS
DIASTOLIC BLOOD PRESSURE: 58 MMHG | WEIGHT: 178 LBS | BODY MASS INDEX: 25.54 KG/M2 | SYSTOLIC BLOOD PRESSURE: 109 MMHG | HEART RATE: 81 BPM

## 2022-01-17 DIAGNOSIS — Z95.1 S/P CABG X 4: Primary | ICD-10-CM

## 2022-01-17 PROCEDURE — 93798 PHYS/QHP OP CAR RHAB W/ECG: CPT

## 2022-01-19 ENCOUNTER — HOSPITAL ENCOUNTER (OUTPATIENT)
Dept: CARDIAC REHAB | Facility: HOSPITAL | Age: 72
Setting detail: THERAPIES SERIES
Discharge: HOME | End: 2022-01-19

## 2022-01-19 VITALS — SYSTOLIC BLOOD PRESSURE: 98 MMHG | HEART RATE: 90 BPM | DIASTOLIC BLOOD PRESSURE: 57 MMHG

## 2022-01-19 DIAGNOSIS — Z95.1 S/P CABG X 4: Primary | ICD-10-CM

## 2022-01-19 PROCEDURE — 93798 PHYS/QHP OP CAR RHAB W/ECG: CPT

## 2022-01-21 ENCOUNTER — HOSPITAL ENCOUNTER (OUTPATIENT)
Dept: CARDIAC REHAB | Facility: HOSPITAL | Age: 72
Setting detail: THERAPIES SERIES
Discharge: HOME | End: 2022-01-21

## 2022-01-21 VITALS — DIASTOLIC BLOOD PRESSURE: 58 MMHG | HEART RATE: 80 BPM | SYSTOLIC BLOOD PRESSURE: 99 MMHG

## 2022-01-21 DIAGNOSIS — Z95.1 S/P CABG X 4: Primary | ICD-10-CM

## 2022-01-21 PROCEDURE — 93798 PHYS/QHP OP CAR RHAB W/ECG: CPT

## 2022-01-24 ENCOUNTER — HOSPITAL ENCOUNTER (OUTPATIENT)
Dept: CARDIAC REHAB | Facility: HOSPITAL | Age: 72
Setting detail: THERAPIES SERIES
Discharge: HOME | End: 2022-01-24

## 2022-01-24 VITALS
BODY MASS INDEX: 25.83 KG/M2 | WEIGHT: 180 LBS | HEART RATE: 74 BPM | SYSTOLIC BLOOD PRESSURE: 104 MMHG | DIASTOLIC BLOOD PRESSURE: 56 MMHG

## 2022-01-24 DIAGNOSIS — Z95.1 S/P CABG X 4: Primary | ICD-10-CM

## 2022-01-24 PROCEDURE — 93798 PHYS/QHP OP CAR RHAB W/ECG: CPT

## 2022-01-26 ENCOUNTER — HOSPITAL ENCOUNTER (OUTPATIENT)
Dept: CARDIAC REHAB | Facility: HOSPITAL | Age: 72
Setting detail: THERAPIES SERIES
Discharge: HOME | End: 2022-01-26

## 2022-01-26 VITALS — HEART RATE: 77 BPM | DIASTOLIC BLOOD PRESSURE: 62 MMHG | SYSTOLIC BLOOD PRESSURE: 101 MMHG

## 2022-01-26 DIAGNOSIS — Z95.1 S/P CABG X 4: Primary | ICD-10-CM

## 2022-01-26 PROCEDURE — 93798 PHYS/QHP OP CAR RHAB W/ECG: CPT

## 2022-01-28 ENCOUNTER — APPOINTMENT (OUTPATIENT)
Dept: CARDIAC REHAB | Facility: HOSPITAL | Age: 72
End: 2022-01-28

## 2022-01-31 ENCOUNTER — HOSPITAL ENCOUNTER (OUTPATIENT)
Dept: CARDIAC REHAB | Facility: HOSPITAL | Age: 72
Setting detail: THERAPIES SERIES
Discharge: HOME | End: 2022-01-31

## 2022-01-31 VITALS
WEIGHT: 182 LBS | SYSTOLIC BLOOD PRESSURE: 100 MMHG | HEART RATE: 77 BPM | BODY MASS INDEX: 26.11 KG/M2 | DIASTOLIC BLOOD PRESSURE: 58 MMHG

## 2022-01-31 DIAGNOSIS — Z95.1 S/P CABG X 4: Primary | ICD-10-CM

## 2022-01-31 PROCEDURE — 93798 PHYS/QHP OP CAR RHAB W/ECG: CPT

## 2022-02-02 ENCOUNTER — HOSPITAL ENCOUNTER (OUTPATIENT)
Dept: CARDIAC REHAB | Facility: HOSPITAL | Age: 72
Setting detail: THERAPIES SERIES
Discharge: HOME | End: 2022-02-02

## 2022-02-02 VITALS — HEART RATE: 83 BPM | SYSTOLIC BLOOD PRESSURE: 101 MMHG | DIASTOLIC BLOOD PRESSURE: 56 MMHG

## 2022-02-02 DIAGNOSIS — Z95.1 S/P CABG X 4: Primary | ICD-10-CM

## 2022-02-02 PROCEDURE — 93798 PHYS/QHP OP CAR RHAB W/ECG: CPT

## 2022-02-07 ENCOUNTER — HOSPITAL ENCOUNTER (OUTPATIENT)
Dept: CARDIAC REHAB | Facility: HOSPITAL | Age: 72
Setting detail: THERAPIES SERIES
Discharge: HOME | End: 2022-02-07

## 2022-02-07 VITALS
HEART RATE: 83 BPM | SYSTOLIC BLOOD PRESSURE: 96 MMHG | WEIGHT: 179 LBS | DIASTOLIC BLOOD PRESSURE: 57 MMHG | BODY MASS INDEX: 25.68 KG/M2

## 2022-02-07 DIAGNOSIS — Z95.1 S/P CABG X 4: Primary | ICD-10-CM

## 2022-02-07 PROCEDURE — 93798 PHYS/QHP OP CAR RHAB W/ECG: CPT

## 2022-02-09 ENCOUNTER — HOSPITAL ENCOUNTER (OUTPATIENT)
Dept: CARDIAC REHAB | Facility: HOSPITAL | Age: 72
Setting detail: THERAPIES SERIES
Discharge: HOME | End: 2022-02-09

## 2022-02-09 VITALS — HEART RATE: 71 BPM | SYSTOLIC BLOOD PRESSURE: 98 MMHG | DIASTOLIC BLOOD PRESSURE: 68 MMHG

## 2022-02-09 DIAGNOSIS — Z95.1 S/P CABG X 4: Primary | ICD-10-CM

## 2022-02-09 PROCEDURE — 93798 PHYS/QHP OP CAR RHAB W/ECG: CPT

## 2022-02-11 ENCOUNTER — HOSPITAL ENCOUNTER (OUTPATIENT)
Dept: CARDIAC REHAB | Facility: HOSPITAL | Age: 72
Setting detail: THERAPIES SERIES
Discharge: HOME | End: 2022-02-11

## 2022-02-11 VITALS — DIASTOLIC BLOOD PRESSURE: 57 MMHG | HEART RATE: 73 BPM | SYSTOLIC BLOOD PRESSURE: 109 MMHG

## 2022-02-11 DIAGNOSIS — Z95.1 S/P CABG X 4: Primary | ICD-10-CM

## 2022-02-11 PROCEDURE — 93798 PHYS/QHP OP CAR RHAB W/ECG: CPT

## 2022-02-14 ENCOUNTER — HOSPITAL ENCOUNTER (OUTPATIENT)
Dept: CARDIAC REHAB | Facility: HOSPITAL | Age: 72
Setting detail: THERAPIES SERIES
Discharge: HOME | End: 2022-02-14

## 2022-02-14 VITALS
DIASTOLIC BLOOD PRESSURE: 58 MMHG | HEART RATE: 78 BPM | BODY MASS INDEX: 26.11 KG/M2 | SYSTOLIC BLOOD PRESSURE: 101 MMHG | WEIGHT: 182 LBS

## 2022-02-14 DIAGNOSIS — Z95.1 S/P CABG X 4: Primary | ICD-10-CM

## 2022-02-14 PROCEDURE — 93798 PHYS/QHP OP CAR RHAB W/ECG: CPT

## 2022-02-16 ENCOUNTER — HOSPITAL ENCOUNTER (OUTPATIENT)
Dept: CARDIAC REHAB | Facility: HOSPITAL | Age: 72
Setting detail: THERAPIES SERIES
Discharge: HOME | End: 2022-02-16

## 2022-02-16 VITALS — HEART RATE: 72 BPM | SYSTOLIC BLOOD PRESSURE: 111 MMHG | DIASTOLIC BLOOD PRESSURE: 58 MMHG

## 2022-02-16 DIAGNOSIS — Z95.1 S/P CABG X 4: Primary | ICD-10-CM

## 2022-02-16 PROCEDURE — 93798 PHYS/QHP OP CAR RHAB W/ECG: CPT

## 2022-02-18 ENCOUNTER — HOSPITAL ENCOUNTER (OUTPATIENT)
Dept: CARDIAC REHAB | Facility: HOSPITAL | Age: 72
Setting detail: THERAPIES SERIES
Discharge: HOME | End: 2022-02-18

## 2022-02-18 VITALS — HEART RATE: 77 BPM | DIASTOLIC BLOOD PRESSURE: 65 MMHG | SYSTOLIC BLOOD PRESSURE: 96 MMHG

## 2022-02-18 DIAGNOSIS — Z95.1 S/P CABG X 4: Primary | ICD-10-CM

## 2022-02-18 PROCEDURE — 93798 PHYS/QHP OP CAR RHAB W/ECG: CPT

## 2022-02-21 ENCOUNTER — HOSPITAL ENCOUNTER (OUTPATIENT)
Dept: CARDIAC REHAB | Facility: HOSPITAL | Age: 72
Setting detail: THERAPIES SERIES
Discharge: HOME | End: 2022-02-21

## 2022-02-21 VITALS
DIASTOLIC BLOOD PRESSURE: 68 MMHG | BODY MASS INDEX: 26.11 KG/M2 | SYSTOLIC BLOOD PRESSURE: 98 MMHG | HEART RATE: 83 BPM | WEIGHT: 182 LBS

## 2022-02-21 DIAGNOSIS — Z95.1 S/P CABG X 4: Primary | ICD-10-CM

## 2022-02-21 PROCEDURE — 93798 PHYS/QHP OP CAR RHAB W/ECG: CPT

## 2022-02-23 ENCOUNTER — HOSPITAL ENCOUNTER (OUTPATIENT)
Dept: CARDIAC REHAB | Facility: HOSPITAL | Age: 72
Setting detail: THERAPIES SERIES
Discharge: HOME | End: 2022-02-23

## 2022-02-23 VITALS — HEART RATE: 68 BPM | SYSTOLIC BLOOD PRESSURE: 117 MMHG | DIASTOLIC BLOOD PRESSURE: 64 MMHG

## 2022-02-23 DIAGNOSIS — Z95.1 S/P CABG X 4: Primary | ICD-10-CM

## 2022-02-23 PROCEDURE — 93798 PHYS/QHP OP CAR RHAB W/ECG: CPT

## 2022-02-25 ENCOUNTER — HOSPITAL ENCOUNTER (OUTPATIENT)
Dept: CARDIAC REHAB | Facility: HOSPITAL | Age: 72
Setting detail: THERAPIES SERIES
Discharge: HOME | End: 2022-02-25

## 2022-02-25 VITALS — HEART RATE: 72 BPM | SYSTOLIC BLOOD PRESSURE: 105 MMHG | DIASTOLIC BLOOD PRESSURE: 59 MMHG

## 2022-02-25 DIAGNOSIS — Z95.1 S/P CABG X 4: Primary | ICD-10-CM

## 2022-02-25 PROCEDURE — 93798 PHYS/QHP OP CAR RHAB W/ECG: CPT

## 2022-02-28 ENCOUNTER — HOSPITAL ENCOUNTER (OUTPATIENT)
Dept: CARDIAC REHAB | Facility: HOSPITAL | Age: 72
Setting detail: THERAPIES SERIES
Discharge: HOME | End: 2022-02-28

## 2022-02-28 VITALS
HEART RATE: 66 BPM | BODY MASS INDEX: 26.11 KG/M2 | SYSTOLIC BLOOD PRESSURE: 109 MMHG | WEIGHT: 182 LBS | DIASTOLIC BLOOD PRESSURE: 67 MMHG

## 2022-02-28 DIAGNOSIS — Z95.1 S/P CABG X 4: Primary | ICD-10-CM

## 2022-02-28 PROCEDURE — 93798 PHYS/QHP OP CAR RHAB W/ECG: CPT

## 2022-03-02 ENCOUNTER — HOSPITAL ENCOUNTER (OUTPATIENT)
Dept: CARDIAC REHAB | Facility: HOSPITAL | Age: 72
Setting detail: THERAPIES SERIES
Discharge: HOME | End: 2022-03-02

## 2022-03-02 VITALS — DIASTOLIC BLOOD PRESSURE: 56 MMHG | HEART RATE: 83 BPM | SYSTOLIC BLOOD PRESSURE: 108 MMHG

## 2022-03-02 DIAGNOSIS — Z95.1 S/P CABG X 4: Primary | ICD-10-CM

## 2022-03-02 PROCEDURE — 93798 PHYS/QHP OP CAR RHAB W/ECG: CPT

## 2022-03-04 ENCOUNTER — HOSPITAL ENCOUNTER (OUTPATIENT)
Dept: CARDIAC REHAB | Facility: HOSPITAL | Age: 72
Setting detail: THERAPIES SERIES
Discharge: HOME | End: 2022-03-04

## 2022-03-04 VITALS — DIASTOLIC BLOOD PRESSURE: 56 MMHG | SYSTOLIC BLOOD PRESSURE: 105 MMHG | HEART RATE: 77 BPM

## 2022-03-04 DIAGNOSIS — Z95.1 S/P CABG X 4: Primary | ICD-10-CM

## 2022-03-04 PROCEDURE — 93798 PHYS/QHP OP CAR RHAB W/ECG: CPT

## 2022-03-07 ENCOUNTER — HOSPITAL ENCOUNTER (OUTPATIENT)
Dept: CARDIAC REHAB | Facility: HOSPITAL | Age: 72
Setting detail: THERAPIES SERIES
Discharge: HOME | End: 2022-03-07

## 2022-03-07 VITALS
SYSTOLIC BLOOD PRESSURE: 91 MMHG | DIASTOLIC BLOOD PRESSURE: 55 MMHG | BODY MASS INDEX: 26.11 KG/M2 | WEIGHT: 182 LBS | HEART RATE: 70 BPM

## 2022-03-07 DIAGNOSIS — Z95.1 S/P CABG X 4: Primary | ICD-10-CM

## 2022-03-07 PROCEDURE — 93798 PHYS/QHP OP CAR RHAB W/ECG: CPT

## 2022-03-09 ENCOUNTER — HOSPITAL ENCOUNTER (OUTPATIENT)
Dept: CARDIAC REHAB | Facility: HOSPITAL | Age: 72
Setting detail: THERAPIES SERIES
Discharge: HOME | End: 2022-03-09

## 2022-03-09 VITALS — HEART RATE: 83 BPM | DIASTOLIC BLOOD PRESSURE: 58 MMHG | SYSTOLIC BLOOD PRESSURE: 101 MMHG

## 2022-03-09 DIAGNOSIS — Z95.1 S/P CABG X 4: Primary | ICD-10-CM

## 2022-03-09 PROCEDURE — 93798 PHYS/QHP OP CAR RHAB W/ECG: CPT

## 2022-03-09 NOTE — ADDENDUM NOTE
Encounter addended by: Nichol Win, RRT on: 3/9/2022 8:49 AM   Actions taken: Charge Capture section accepted

## 2022-03-11 ENCOUNTER — HOSPITAL ENCOUNTER (OUTPATIENT)
Dept: CARDIAC REHAB | Facility: HOSPITAL | Age: 72
Setting detail: THERAPIES SERIES
Discharge: HOME | End: 2022-03-11

## 2022-03-11 VITALS — DIASTOLIC BLOOD PRESSURE: 58 MMHG | SYSTOLIC BLOOD PRESSURE: 104 MMHG | HEART RATE: 75 BPM

## 2022-03-11 DIAGNOSIS — Z95.1 S/P CABG X 4: Primary | ICD-10-CM

## 2022-03-11 PROCEDURE — 93798 PHYS/QHP OP CAR RHAB W/ECG: CPT

## 2022-03-14 ENCOUNTER — HOSPITAL ENCOUNTER (OUTPATIENT)
Dept: CARDIAC REHAB | Facility: HOSPITAL | Age: 72
Setting detail: THERAPIES SERIES
Discharge: HOME | End: 2022-03-14

## 2022-03-14 VITALS — DIASTOLIC BLOOD PRESSURE: 60 MMHG | HEART RATE: 77 BPM | SYSTOLIC BLOOD PRESSURE: 111 MMHG

## 2022-03-14 DIAGNOSIS — Z95.1 S/P CABG X 4: Primary | ICD-10-CM

## 2022-03-14 PROCEDURE — 93798 PHYS/QHP OP CAR RHAB W/ECG: CPT

## 2022-03-16 ENCOUNTER — HOSPITAL ENCOUNTER (OUTPATIENT)
Dept: CARDIAC REHAB | Facility: HOSPITAL | Age: 72
Setting detail: THERAPIES SERIES
Discharge: HOME | End: 2022-03-16

## 2022-03-16 VITALS — HEART RATE: 63 BPM | DIASTOLIC BLOOD PRESSURE: 55 MMHG | SYSTOLIC BLOOD PRESSURE: 98 MMHG

## 2022-03-16 DIAGNOSIS — Z95.1 S/P CABG X 4: Primary | ICD-10-CM

## 2022-03-16 PROCEDURE — 93798 PHYS/QHP OP CAR RHAB W/ECG: CPT

## (undated) DEVICE — BG TRANSF W/COUPLER SPK 600ML

## (undated) DEVICE — GW PERIPH GUIDERIGHT STD/J/TP PTFE/PCOAT SS 0.038IN 5X150CM

## (undated) DEVICE — SYS VASOVIEW HEMOPRO ENDOSCOPIC HARVST VESL

## (undated) DEVICE — CORONARY ARTERY BYPASS GRAFT MARKERS, STAINLESS STEEL, DISTAL, WITHOUT HOLDER: Brand: ANASTOMARK CORONARY ARTERY BYPASS GRAFT MARKERS, STAINLESS STEEL, DISTAL

## (undated) DEVICE — PK HEART OPN 40

## (undated) DEVICE — Device

## (undated) DEVICE — COPILOT KIT INCLUDES BLEEDBACK CONTROL VALVE / GUIDE WIRE INTRODUCER / TORQUE DEVICE: Brand: ACCESSORIES

## (undated) DEVICE — PK CATH LAB 60

## (undated) DEVICE — SUT SILK 0 CT1 CR8 18IN C021D

## (undated) DEVICE — SUT VIC 0 CT1 CR8 27IN JJ41G

## (undated) DEVICE — HARMONIC SYNERGY DISSECTING HOOK WITH TORQUE WRENCH. FOR USE WITH BLUE HAND PIECE ONLY: Brand: HARMONIC SYNERGY

## (undated) DEVICE — CATH GUIDE LAUNCHER JL4.0 6F 100CM

## (undated) DEVICE — CATH DIAG IMPULSE M/ PK 145 5FR

## (undated) DEVICE — GLV SURG SIGNATURE ESSENTIAL PF LTX SZ7.5

## (undated) DEVICE — HEMOCONCENTRATOR PERFUS LPS06

## (undated) DEVICE — Device: Brand: LEVEL 1

## (undated) DEVICE — GLV SURG BIOGEL SENSR LTX PF SZ7.5

## (undated) DEVICE — ST. SORBAVIEW ULTIMATE IJ SYSTEM A,C: Brand: CENTURION

## (undated) DEVICE — TBG INSUFFLATION LUER LOCK: Brand: MEDLINE INDUSTRIES, INC.

## (undated) DEVICE — 12 FOOT DISPOSABLE EXTENSION CABLE WITH SAFE CONNECT / SCREW-DOWN

## (undated) DEVICE — ADHS SKIN SURG TISS VISC PREMIERPRO EXOFIN HI/VISC FAST/DRY

## (undated) DEVICE — DRSNG SURESITE WNDW 2.38X2.75

## (undated) DEVICE — PK ATS CUST W CARDIOTOMY RESEVOIR

## (undated) DEVICE — MODEL BT2000 P/N 700287-012KIT CONTENTS: MANIFOLD WITH SALINE AND CONTRAST PORTS, SALINE TUBING WITH SPIKE AND HAND SYRINGE, TRANSDUCER: Brand: BT2000 AUTOMATED MANIFOLD KIT

## (undated) DEVICE — MODEL AT P65, P/N 701554-001KIT CONTENTS: HAND CONTROLLER, 3-WAY HIGH-PRESSURE STOPCOCK WITH ROTATING END AND PREMIUM HIGH-PRESSURE TUBING: Brand: ANGIOTOUCH® KIT

## (undated) DEVICE — NEEDLE, QUINCKE, 20GX3.5": Brand: MEDLINE

## (undated) DEVICE — DRN WND CH RND FUL/FLUT NO/TROC 3/8IN 28F

## (undated) DEVICE — SUT PROLN MO.5 7/0 DBLARM BV175 6 2X30 BX/12

## (undated) DEVICE — CANN ART DLP STR/TIP 18F7IN

## (undated) DEVICE — SOL IRR H2O BTL 1000ML STRL

## (undated) DEVICE — SOL IRR NACL 0.9PCT BT 1000ML

## (undated) DEVICE — MEDICINE CUP, GRADUATED, STER: Brand: MEDLINE

## (undated) DEVICE — SENSR CERBRL O2 PK/2

## (undated) DEVICE — SYS PERFUS SEP PLATLT W TIPS CUST

## (undated) DEVICE — OASIS DRAIN, SINGLE, INLINE & ATS COMPATIBLE: Brand: OASIS

## (undated) DEVICE — CVR PROB 96IN LF STRL

## (undated) DEVICE — PK SUT OPN HEART POLLOCK CUST

## (undated) DEVICE — LOU OPEN HEART DR POLLOCK: Brand: MEDLINE INDUSTRIES, INC.

## (undated) DEVICE — PK PERFUS CUST W/CARDIOPLEGIA

## (undated) DEVICE — DRSNG WND GEL FIBR OPTICELL AG PLS W/SLV LF 4X5IN  STRL

## (undated) DEVICE — GLV SURG BIOGEL M LTX PF 7 1/2

## (undated) DEVICE — 28 FR STRAIGHT – SOFT PVC CATHETER: Brand: PVC THORACIC CATHETERS

## (undated) DEVICE — ELECTRODE,RT,STRESS,FOAM,50PK: Brand: MEDLINE

## (undated) DEVICE — A2000 MULTI-USE SYRINGE KIT, P/N 701277-003KIT CONTENTS: 100ML CONTRAST RESERVOIR AND TUBING WITH CONTRAST SPIKE AND CLAMP: Brand: A2000 MULTI-USE SYRINGE KIT

## (undated) DEVICE — BLOWER/MISTER AXIOUS OPCAB W/TBG

## (undated) DEVICE — KT INTRO MINISTICK MAX W/GW NITNL/TUNG ECHO 4F 21G 7CM

## (undated) DEVICE — TOWEL,OR,DSP,ST,WHITE,DLX,4/PK,20PK/CS: Brand: MEDLINE

## (undated) DEVICE — SYR LUERLOK 20CC BX/50

## (undated) DEVICE — DRP SLUSH WARMR MACH CIR 44X44IN

## (undated) DEVICE — BIOPATCH™ ANTIMICROBIAL DRESSING WITH CHLORHEXIDINE GLUCONATE IS A HYDROPHILLIC POLYURETHANE ABSORPTIVE FOAM WITH CHLORHEXIDINE GLUCONATE (CHG) WHICH INHIBITS BACTERIAL GROWTH UNDER THE DRESSING. THE DRESSING IS INTENDED TO BE USED TO ABSORB EXUDATE, COVER A WOUND CAUSED BY VASCULAR AND NONVASCULAR PERCUTANEOUS MEDICAL DEVICES DURING SURGERY, AS WELL AS REDUCE LOCAL INFECTION AND COLONIZATION OF MICROORGANISMS.: Brand: BIOPATCH

## (undated) DEVICE — INTRO SHEATH ART/FEM ENGAGE .038 6F12CM

## (undated) DEVICE — SOL ISO/ALC RUB 70PCT 4OZ

## (undated) DEVICE — ANGIO-SEAL VIP VASCULAR CLOSURE DEVICE: Brand: ANGIO-SEAL